# Patient Record
Sex: FEMALE | Race: WHITE | NOT HISPANIC OR LATINO | URBAN - METROPOLITAN AREA
[De-identification: names, ages, dates, MRNs, and addresses within clinical notes are randomized per-mention and may not be internally consistent; named-entity substitution may affect disease eponyms.]

---

## 2018-05-30 ENCOUNTER — EMERGENCY (EMERGENCY)
Facility: HOSPITAL | Age: 83
LOS: 1 days | Discharge: HOME | End: 2018-05-30
Attending: EMERGENCY MEDICINE
Payer: MEDICARE

## 2018-05-30 VITALS
SYSTOLIC BLOOD PRESSURE: 182 MMHG | DIASTOLIC BLOOD PRESSURE: 85 MMHG | TEMPERATURE: 98 F | OXYGEN SATURATION: 98 % | HEIGHT: 60 IN | RESPIRATION RATE: 16 BRPM | WEIGHT: 121.92 LBS | HEART RATE: 70 BPM

## 2018-05-30 VITALS — DIASTOLIC BLOOD PRESSURE: 75 MMHG | SYSTOLIC BLOOD PRESSURE: 169 MMHG | HEART RATE: 81 BPM

## 2018-05-30 DIAGNOSIS — I10 ESSENTIAL (PRIMARY) HYPERTENSION: ICD-10-CM

## 2018-05-30 DIAGNOSIS — K76.89 OTHER SPECIFIED DISEASES OF LIVER: Chronic | ICD-10-CM

## 2018-05-30 DIAGNOSIS — Z88.0 ALLERGY STATUS TO PENICILLIN: ICD-10-CM

## 2018-05-30 DIAGNOSIS — R29.810 FACIAL WEAKNESS: ICD-10-CM

## 2018-05-30 DIAGNOSIS — E78.5 HYPERLIPIDEMIA, UNSPECIFIED: ICD-10-CM

## 2018-05-30 LAB
ALBUMIN SERPL ELPH-MCNC: 3.9 G/DL — SIGNIFICANT CHANGE UP (ref 3.3–5)
ALP SERPL-CCNC: 70 U/L — SIGNIFICANT CHANGE UP (ref 40–120)
ALT FLD-CCNC: 23 U/L — SIGNIFICANT CHANGE UP (ref 12–78)
ANION GAP SERPL CALC-SCNC: 8 MMOL/L — SIGNIFICANT CHANGE UP (ref 5–17)
APTT BLD: 27.9 SEC — SIGNIFICANT CHANGE UP (ref 27.5–37.4)
AST SERPL-CCNC: 29 U/L — SIGNIFICANT CHANGE UP (ref 15–37)
BASOPHILS # BLD AUTO: 0.02 K/UL — SIGNIFICANT CHANGE UP (ref 0–0.2)
BASOPHILS NFR BLD AUTO: 0.3 % — SIGNIFICANT CHANGE UP (ref 0–2)
BILIRUB SERPL-MCNC: 0.4 MG/DL — SIGNIFICANT CHANGE UP (ref 0.2–1.2)
BUN SERPL-MCNC: 28 MG/DL — HIGH (ref 7–23)
CALCIUM SERPL-MCNC: 9 MG/DL — SIGNIFICANT CHANGE UP (ref 8.5–10.1)
CHLORIDE SERPL-SCNC: 102 MMOL/L — SIGNIFICANT CHANGE UP (ref 96–108)
CO2 SERPL-SCNC: 27 MMOL/L — SIGNIFICANT CHANGE UP (ref 22–31)
CREAT SERPL-MCNC: 1.2 MG/DL — SIGNIFICANT CHANGE UP (ref 0.5–1.3)
EOSINOPHIL # BLD AUTO: 0.05 K/UL — SIGNIFICANT CHANGE UP (ref 0–0.5)
EOSINOPHIL NFR BLD AUTO: 0.6 % — SIGNIFICANT CHANGE UP (ref 0–6)
GLUCOSE SERPL-MCNC: 112 MG/DL — HIGH (ref 70–99)
HCT VFR BLD CALC: 38.6 % — SIGNIFICANT CHANGE UP (ref 34.5–45)
HGB BLD-MCNC: 12.9 G/DL — SIGNIFICANT CHANGE UP (ref 11.5–15.5)
IMM GRANULOCYTES NFR BLD AUTO: 0.4 % — SIGNIFICANT CHANGE UP (ref 0–1.5)
INR BLD: 1.04 RATIO — SIGNIFICANT CHANGE UP (ref 0.88–1.16)
LYMPHOCYTES # BLD AUTO: 1.33 K/UL — SIGNIFICANT CHANGE UP (ref 1–3.3)
LYMPHOCYTES # BLD AUTO: 17.1 % — SIGNIFICANT CHANGE UP (ref 13–44)
MCHC RBC-ENTMCNC: 29.9 PG — SIGNIFICANT CHANGE UP (ref 27–34)
MCHC RBC-ENTMCNC: 33.4 GM/DL — SIGNIFICANT CHANGE UP (ref 32–36)
MCV RBC AUTO: 89.6 FL — SIGNIFICANT CHANGE UP (ref 80–100)
MONOCYTES # BLD AUTO: 1.11 K/UL — HIGH (ref 0–0.9)
MONOCYTES NFR BLD AUTO: 14.3 % — HIGH (ref 2–14)
NEUTROPHILS # BLD AUTO: 5.22 K/UL — SIGNIFICANT CHANGE UP (ref 1.8–7.4)
NEUTROPHILS NFR BLD AUTO: 67.3 % — SIGNIFICANT CHANGE UP (ref 43–77)
NRBC # BLD: 0 /100 WBCS — SIGNIFICANT CHANGE UP (ref 0–0)
PLATELET # BLD AUTO: 217 K/UL — SIGNIFICANT CHANGE UP (ref 150–400)
POTASSIUM SERPL-MCNC: 4.5 MMOL/L — SIGNIFICANT CHANGE UP (ref 3.5–5.3)
POTASSIUM SERPL-SCNC: 4.5 MMOL/L — SIGNIFICANT CHANGE UP (ref 3.5–5.3)
PROT SERPL-MCNC: 7.9 G/DL — SIGNIFICANT CHANGE UP (ref 6–8.3)
PROTHROM AB SERPL-ACNC: 11.3 SEC — SIGNIFICANT CHANGE UP (ref 9.8–12.7)
RBC # BLD: 4.31 M/UL — SIGNIFICANT CHANGE UP (ref 3.8–5.2)
RBC # FLD: 13.8 % — SIGNIFICANT CHANGE UP (ref 10.3–14.5)
SODIUM SERPL-SCNC: 137 MMOL/L — SIGNIFICANT CHANGE UP (ref 135–145)
WBC # BLD: 7.76 K/UL — SIGNIFICANT CHANGE UP (ref 3.8–10.5)
WBC # FLD AUTO: 7.76 K/UL — SIGNIFICANT CHANGE UP (ref 3.8–10.5)

## 2018-05-30 PROCEDURE — 70498 CT ANGIOGRAPHY NECK: CPT | Mod: 26

## 2018-05-30 PROCEDURE — 70450 CT HEAD/BRAIN W/O DYE: CPT

## 2018-05-30 PROCEDURE — 85610 PROTHROMBIN TIME: CPT

## 2018-05-30 PROCEDURE — 85027 COMPLETE CBC AUTOMATED: CPT

## 2018-05-30 PROCEDURE — 93010 ELECTROCARDIOGRAM REPORT: CPT

## 2018-05-30 PROCEDURE — 71045 X-RAY EXAM CHEST 1 VIEW: CPT | Mod: 26

## 2018-05-30 PROCEDURE — 99284 EMERGENCY DEPT VISIT MOD MDM: CPT | Mod: 25

## 2018-05-30 PROCEDURE — 70450 CT HEAD/BRAIN W/O DYE: CPT | Mod: 26,59

## 2018-05-30 PROCEDURE — 93005 ELECTROCARDIOGRAM TRACING: CPT

## 2018-05-30 PROCEDURE — 71045 X-RAY EXAM CHEST 1 VIEW: CPT

## 2018-05-30 PROCEDURE — 85730 THROMBOPLASTIN TIME PARTIAL: CPT

## 2018-05-30 PROCEDURE — 70496 CT ANGIOGRAPHY HEAD: CPT | Mod: 26

## 2018-05-30 PROCEDURE — 70498 CT ANGIOGRAPHY NECK: CPT

## 2018-05-30 PROCEDURE — 36415 COLL VENOUS BLD VENIPUNCTURE: CPT

## 2018-05-30 PROCEDURE — 96360 HYDRATION IV INFUSION INIT: CPT | Mod: XU

## 2018-05-30 PROCEDURE — 70496 CT ANGIOGRAPHY HEAD: CPT

## 2018-05-30 PROCEDURE — 99285 EMERGENCY DEPT VISIT HI MDM: CPT

## 2018-05-30 PROCEDURE — 80053 COMPREHEN METABOLIC PANEL: CPT

## 2018-05-30 RX ORDER — SODIUM CHLORIDE 9 MG/ML
3 INJECTION INTRAMUSCULAR; INTRAVENOUS; SUBCUTANEOUS ONCE
Qty: 0 | Refills: 0 | Status: COMPLETED | OUTPATIENT
Start: 2018-05-30 | End: 2018-05-30

## 2018-05-30 RX ORDER — ASPIRIN/CALCIUM CARB/MAGNESIUM 324 MG
325 TABLET ORAL ONCE
Qty: 0 | Refills: 0 | Status: COMPLETED | OUTPATIENT
Start: 2018-05-30 | End: 2018-05-30

## 2018-05-30 RX ORDER — SODIUM CHLORIDE 9 MG/ML
1000 INJECTION INTRAMUSCULAR; INTRAVENOUS; SUBCUTANEOUS ONCE
Qty: 0 | Refills: 0 | Status: COMPLETED | OUTPATIENT
Start: 2018-05-30 | End: 2018-05-30

## 2018-05-30 RX ADMIN — SODIUM CHLORIDE 1000 MILLILITER(S): 9 INJECTION INTRAMUSCULAR; INTRAVENOUS; SUBCUTANEOUS at 17:19

## 2018-05-30 RX ADMIN — Medication 325 MILLIGRAM(S): at 17:43

## 2018-05-30 RX ADMIN — SODIUM CHLORIDE 3 MILLILITER(S): 9 INJECTION INTRAMUSCULAR; INTRAVENOUS; SUBCUTANEOUS at 16:45

## 2018-05-30 NOTE — ED PROVIDER NOTE - OBJECTIVE STATEMENT
89 yo F p/w L sided facial droop, constant x past 2 days. No cp/sob/palp. no weak / dizzy / malaise. no change in speech. No numb/ting/focal weak. No neck pain / stiffness. no fever/chills/recent illness. no agg/allev factors. No other inj or co. no recent trauma.

## 2018-05-30 NOTE — ED PROVIDER NOTE - CHPI ED SYMPTOMS NEG
no confusion/no change in level of consciousness/no dizziness/no fever/no vomiting/no numbness/no loss of consciousness/no nausea/no weakness/no blurred vision

## 2018-05-30 NOTE — ED PROVIDER NOTE - PROGRESS NOTE DETAILS
Pt seen by Dr Ramires, check CTA head / neck, can dc home if no acute Pt seen by Dr Ramires, check CTA head / neck, can dc home if no acute. Upon further exam , no further droop seen. Tony Ramires re cta findings, ok to dc home, pt to fu with outtp neuro. Repeat CT in 2 3 - 6 months.  Reevaluated patient at bedside.  Patient feeling much improved.  Discussed the results of all diagnostic testing in ED and copies of all reports given.   An opportunity to ask questions was given.  Discussed the importance of prompt, close medical follow-up.  Patient will return with any changes, concerns or persistent / worsening symptoms.  Understanding of all instructions verbalized.   Dw joaquim re need for fu with Neuro, repeat cta in 3 - 6 mo and need for close, propmt fu

## 2018-05-30 NOTE — ED ADULT NURSE NOTE - OBJECTIVE STATEMENT
Received patient awake and alert x 4, presenting to the ED with C/O left sided facial droop that she first noticed on Monday 5/28/18. Patient reports no change in speech. Able to speak in full clear sentences, patient is otherwise neurologically intact, no extremity weakness, no dizziness, no blurry vision. Denies any SOB/CP. Breathing unlabored with no acute distress noted, caregiver at bedside. IV access maintained, safety and comfort measures maintained, will continue to monitor.

## 2018-05-30 NOTE — ED ADULT TRIAGE NOTE - CHIEF COMPLAINT QUOTE
Pt reports weakness/numbness to left face/mouth, slight facial droop, slight difficulty eating/drinking. Symptoms began on 5/28, slightly worse as per pt

## 2018-05-30 NOTE — ED PROVIDER NOTE - PHYSICAL EXAMINATION
NIHSS = 1, dysphagia pass  nl speech. Nl non-focal detailed neuro exam.  L lower facial droop, nl forehead. NIHSS = 1, dysphagia pass  nl speech. Nl non-focal detailed neuro exam.  ?? mild L lower facial droop, nl forehead.

## 2019-07-01 ENCOUNTER — EMERGENCY (EMERGENCY)
Facility: HOSPITAL | Age: 84
LOS: 1 days | Discharge: HOME | End: 2019-07-01
Attending: EMERGENCY MEDICINE | Admitting: EMERGENCY MEDICINE
Payer: MEDICARE

## 2019-07-01 VITALS
HEART RATE: 71 BPM | DIASTOLIC BLOOD PRESSURE: 75 MMHG | SYSTOLIC BLOOD PRESSURE: 174 MMHG | TEMPERATURE: 99 F | OXYGEN SATURATION: 98 % | WEIGHT: 125 LBS | RESPIRATION RATE: 16 BRPM

## 2019-07-01 VITALS
SYSTOLIC BLOOD PRESSURE: 156 MMHG | HEART RATE: 76 BPM | DIASTOLIC BLOOD PRESSURE: 73 MMHG | RESPIRATION RATE: 16 BRPM | TEMPERATURE: 99 F | OXYGEN SATURATION: 98 %

## 2019-07-01 DIAGNOSIS — K76.89 OTHER SPECIFIED DISEASES OF LIVER: Chronic | ICD-10-CM

## 2019-07-01 LAB
APPEARANCE UR: CLEAR — SIGNIFICANT CHANGE UP
BACTERIA # UR AUTO: ABNORMAL
BILIRUB UR-MCNC: NEGATIVE — SIGNIFICANT CHANGE UP
COLOR SPEC: YELLOW — SIGNIFICANT CHANGE UP
DIFF PNL FLD: ABNORMAL
EPI CELLS # UR: ABNORMAL
GLUCOSE UR QL: NEGATIVE — SIGNIFICANT CHANGE UP
KETONES UR-MCNC: NEGATIVE — SIGNIFICANT CHANGE UP
LEUKOCYTE ESTERASE UR-ACNC: ABNORMAL
NITRITE UR-MCNC: NEGATIVE — SIGNIFICANT CHANGE UP
PH UR: 5 — SIGNIFICANT CHANGE UP (ref 5–8)
PROT UR-MCNC: 25 MG/DL
RBC CASTS # UR COMP ASSIST: ABNORMAL /HPF (ref 0–4)
SP GR SPEC: 1.01 — SIGNIFICANT CHANGE UP (ref 1.01–1.02)
UROBILINOGEN FLD QL: NEGATIVE — SIGNIFICANT CHANGE UP
WBC UR QL: SIGNIFICANT CHANGE UP

## 2019-07-01 PROCEDURE — 81001 URINALYSIS AUTO W/SCOPE: CPT

## 2019-07-01 PROCEDURE — 99283 EMERGENCY DEPT VISIT LOW MDM: CPT

## 2019-07-01 PROCEDURE — 72110 X-RAY EXAM L-2 SPINE 4/>VWS: CPT

## 2019-07-01 PROCEDURE — 72110 X-RAY EXAM L-2 SPINE 4/>VWS: CPT | Mod: 26

## 2019-07-01 RX ORDER — TRAMADOL HYDROCHLORIDE 50 MG/1
1 TABLET ORAL
Qty: 30 | Refills: 0
Start: 2019-07-01

## 2019-07-01 RX ORDER — ACETAMINOPHEN 500 MG
650 TABLET ORAL ONCE
Refills: 0 | Status: COMPLETED | OUTPATIENT
Start: 2019-07-01 | End: 2019-07-01

## 2019-07-01 RX ORDER — TRAMADOL HYDROCHLORIDE 50 MG/1
25 TABLET ORAL ONCE
Refills: 0 | Status: DISCONTINUED | OUTPATIENT
Start: 2019-07-01 | End: 2019-07-01

## 2019-07-01 RX ORDER — OMEGA-3 ACID ETHYL ESTERS 1 G
0 CAPSULE ORAL
Qty: 0 | Refills: 0 | DISCHARGE

## 2019-07-01 RX ADMIN — TRAMADOL HYDROCHLORIDE 25 MILLIGRAM(S): 50 TABLET ORAL at 09:23

## 2019-07-01 RX ADMIN — Medication 650 MILLIGRAM(S): at 09:23

## 2019-07-01 RX ADMIN — TRAMADOL HYDROCHLORIDE 25 MILLIGRAM(S): 50 TABLET ORAL at 10:01

## 2019-07-01 RX ADMIN — Medication 650 MILLIGRAM(S): at 10:01

## 2019-07-01 NOTE — ED PROVIDER NOTE - NSFOLLOWUPINSTRUCTIONS_ED_ALL_ED_FT
Back Pain    Back pain is very common in adults. The cause of back pain is rarely dangerous and the pain often gets better over time. The cause of your back pain may not be known and may include strain of muscles or ligaments, degeneration of the spinal disks, or arthritis. Occasionally the pain may radiate down your leg(s). Over-the-counter medicines to reduce pain and inflammation are often the most helpful. Stretching and remaining active frequently helps the healing process.     SEEK IMMEDIATE MEDICAL CARE IF YOU HAVE ANY OF THE FOLLOWING SYMPTOMS: bowel or bladder control problems, unusual weakness or numbness in your arms or legs, nausea or vomiting, abdominal pain, fever, dizziness/lightheadedness.    for pain tylenol   for more severe pain use ultram with caution

## 2019-07-01 NOTE — ED PROVIDER NOTE - CARE PROVIDER_API CALL
[facility] [place])  Orthopaedic Surgery  [address] [place] [address]  Phone: [phone]  Fax: [phone]  Follow Up Time:

## 2019-07-01 NOTE — ED ADULT NURSE NOTE - OBJECTIVE STATEMENT
89 y/o female presents to the ed c/o lower back pain for the past few days. she has hx of severe spinal stenosis ad scoliosis. she states that she was doing leg exercises in bed and the pain became worse today. describes as a burning mid back pain with radiation to lower and left lower back. she denies nausea vomiting fever chills chest pain sob headache abdominal pain and urinary problems.

## 2019-07-01 NOTE — ED PROVIDER NOTE - CLINICAL SUMMARY MEDICAL DECISION MAKING FREE TEXT BOX
acute low back pain worse over past 4 days hx of low back pain and problems no follow up sx began after stretching in bed with mechanical cause

## 2019-07-01 NOTE — ED ADULT NURSE REASSESSMENT NOTE - NS ED NURSE REASSESS COMMENT FT1
pt ambulatory to the bathroom with walker which she uses at home, reports pain 5/10 after oral meds, ua sent at this time

## 2019-07-01 NOTE — ED ADULT NURSE REASSESSMENT NOTE - NS ED NURSE REASSESS COMMENT FT1
pt reports pain has imrpoved to 4/10, safe for discharge, dc instructions given, wheelchair out of er with transporter in wheelchair

## 2019-07-01 NOTE — ED PROVIDER NOTE - MUSCULOSKELETAL, MLM
Spine appears kyphotic and lumbar scoliosis there is arthritic hands. NO slr motor normal light touch intact normal rectal tone

## 2019-07-01 NOTE — ED ADULT NURSE NOTE - NSIMPLEMENTINTERV_GEN_ALL_ED
Implemented All Fall with Harm Risk Interventions:  [place] to call system. Call bell, personal items and telephone within reach. Instruct patient to call for assistance. Room bathroom lighting operational. Non-slip footwear when patient is off stretcher. Physically safe environment: no spills, clutter or unnecessary equipment. Stretcher in lowest position, wheels locked, appropriate side rails in place. Provide visual cue, wrist band, yellow gown, etc. Monitor gait and stability. Monitor for mental status changes and reorient to person, place, and time. Review medications for side effects contributing to fall risk. Reinforce activity limits and safety measures with patient and family. Provide visual clues: red socks.

## 2019-07-01 NOTE — ED PROVIDER NOTE - NORMAL, MLM
bernadette all pertinent systems normal Alert and oriented, no focal deficits, no motor or sensory deficits.

## 2019-07-01 NOTE — ED PROVIDER NOTE - CHPI ED SYMPTOMS NEG
no tingling/no anorexia/no constipation/no motor function loss/no bowel dysfunction/no bladder dysfunction/no fatigue/no neck tenderness/no numbness

## 2019-07-01 NOTE — ED PROVIDER NOTE - OBJECTIVE STATEMENT
pt is a 89 f who has hx of severe spinal stenosis, scoliosis, implanted spinal stimulator (old not maintained with dead battery), sciatica elev chol dm sp liver cyst surgery very remote smoker not a drinker no drugs. PMD dr Stokes at [facility], has no current spine surgeon or pain mgt specialist. usually walks with a walker at home, but for past 4 days after doing stretching exercises at home in bed, she has had pain in her mid low back that his burning and goes to left side of back . no radicular sx no changes in bowel or bladder habits no fever no chills no cp no other co. the pain is typical for exacerbation of her chronic pain   she takes no meds for the pain.  now she is so uncomfortable that according to daughter she is unable to ambulate far despite use of walker over past few days

## 2019-09-17 ENCOUNTER — EMERGENCY (EMERGENCY)
Facility: HOSPITAL | Age: 84
LOS: 1 days | Discharge: HOME | End: 2019-09-17
Attending: EMERGENCY MEDICINE | Admitting: EMERGENCY MEDICINE
Payer: MEDICARE

## 2019-09-17 VITALS
WEIGHT: 119.93 LBS | HEART RATE: 71 BPM | OXYGEN SATURATION: 98 % | RESPIRATION RATE: 18 BRPM | DIASTOLIC BLOOD PRESSURE: 80 MMHG | SYSTOLIC BLOOD PRESSURE: 178 MMHG | TEMPERATURE: 99 F

## 2019-09-17 VITALS
OXYGEN SATURATION: 98 % | DIASTOLIC BLOOD PRESSURE: 77 MMHG | HEART RATE: 62 BPM | SYSTOLIC BLOOD PRESSURE: 177 MMHG | RESPIRATION RATE: 16 BRPM

## 2019-09-17 DIAGNOSIS — K76.89 OTHER SPECIFIED DISEASES OF LIVER: Chronic | ICD-10-CM

## 2019-09-17 LAB
ALBUMIN SERPL ELPH-MCNC: 3.6 G/DL — SIGNIFICANT CHANGE UP (ref 3.3–5)
ALP SERPL-CCNC: 76 U/L — SIGNIFICANT CHANGE UP (ref 40–120)
ALT FLD-CCNC: 24 U/L — SIGNIFICANT CHANGE UP (ref 12–78)
ANION GAP SERPL CALC-SCNC: 9 MMOL/L — SIGNIFICANT CHANGE UP (ref 5–17)
APTT BLD: 27.9 SEC — LOW (ref 28.5–37)
AST SERPL-CCNC: 23 U/L — SIGNIFICANT CHANGE UP (ref 15–37)
BASOPHILS # BLD AUTO: 0.03 K/UL — SIGNIFICANT CHANGE UP (ref 0–0.2)
BASOPHILS NFR BLD AUTO: 0.4 % — SIGNIFICANT CHANGE UP (ref 0–2)
BILIRUB SERPL-MCNC: 0.4 MG/DL — SIGNIFICANT CHANGE UP (ref 0.2–1.2)
BUN SERPL-MCNC: 27 MG/DL — HIGH (ref 7–23)
CALCIUM SERPL-MCNC: 9 MG/DL — SIGNIFICANT CHANGE UP (ref 8.5–10.1)
CHLORIDE SERPL-SCNC: 105 MMOL/L — SIGNIFICANT CHANGE UP (ref 96–108)
CO2 SERPL-SCNC: 26 MMOL/L — SIGNIFICANT CHANGE UP (ref 22–31)
CREAT SERPL-MCNC: 1.1 MG/DL — SIGNIFICANT CHANGE UP (ref 0.5–1.3)
EOSINOPHIL # BLD AUTO: 0.09 K/UL — SIGNIFICANT CHANGE UP (ref 0–0.5)
EOSINOPHIL NFR BLD AUTO: 1.1 % — SIGNIFICANT CHANGE UP (ref 0–6)
GLUCOSE SERPL-MCNC: 114 MG/DL — HIGH (ref 70–99)
HCT VFR BLD CALC: 38.2 % — SIGNIFICANT CHANGE UP (ref 34.5–45)
HGB BLD-MCNC: 12.5 G/DL — SIGNIFICANT CHANGE UP (ref 11.5–15.5)
IMM GRANULOCYTES NFR BLD AUTO: 0.5 % — SIGNIFICANT CHANGE UP (ref 0–1.5)
INR BLD: 1.01 RATIO — SIGNIFICANT CHANGE UP (ref 0.88–1.16)
LYMPHOCYTES # BLD AUTO: 0.86 K/UL — LOW (ref 1–3.3)
LYMPHOCYTES # BLD AUTO: 10.1 % — LOW (ref 13–44)
MCHC RBC-ENTMCNC: 29.8 PG — SIGNIFICANT CHANGE UP (ref 27–34)
MCHC RBC-ENTMCNC: 32.7 GM/DL — SIGNIFICANT CHANGE UP (ref 32–36)
MCV RBC AUTO: 91.2 FL — SIGNIFICANT CHANGE UP (ref 80–100)
MONOCYTES # BLD AUTO: 1.13 K/UL — HIGH (ref 0–0.9)
MONOCYTES NFR BLD AUTO: 13.3 % — SIGNIFICANT CHANGE UP (ref 2–14)
NEUTROPHILS # BLD AUTO: 6.34 K/UL — SIGNIFICANT CHANGE UP (ref 1.8–7.4)
NEUTROPHILS NFR BLD AUTO: 74.6 % — SIGNIFICANT CHANGE UP (ref 43–77)
NRBC # BLD: 0 /100 WBCS — SIGNIFICANT CHANGE UP (ref 0–0)
PLATELET # BLD AUTO: 224 K/UL — SIGNIFICANT CHANGE UP (ref 150–400)
POTASSIUM SERPL-MCNC: 4.3 MMOL/L — SIGNIFICANT CHANGE UP (ref 3.5–5.3)
POTASSIUM SERPL-SCNC: 4.3 MMOL/L — SIGNIFICANT CHANGE UP (ref 3.5–5.3)
PROT SERPL-MCNC: 7.1 G/DL — SIGNIFICANT CHANGE UP (ref 6–8.3)
PROTHROM AB SERPL-ACNC: 11.4 SEC — SIGNIFICANT CHANGE UP (ref 10–12.9)
RBC # BLD: 4.19 M/UL — SIGNIFICANT CHANGE UP (ref 3.8–5.2)
RBC # FLD: 14.5 % — SIGNIFICANT CHANGE UP (ref 10.3–14.5)
SODIUM SERPL-SCNC: 140 MMOL/L — SIGNIFICANT CHANGE UP (ref 135–145)
WBC # BLD: 8.49 K/UL — SIGNIFICANT CHANGE UP (ref 3.8–10.5)
WBC # FLD AUTO: 8.49 K/UL — SIGNIFICANT CHANGE UP (ref 3.8–10.5)

## 2019-09-17 PROCEDURE — 96360 HYDRATION IV INFUSION INIT: CPT

## 2019-09-17 PROCEDURE — 99284 EMERGENCY DEPT VISIT MOD MDM: CPT | Mod: 25

## 2019-09-17 PROCEDURE — 36415 COLL VENOUS BLD VENIPUNCTURE: CPT

## 2019-09-17 PROCEDURE — 93005 ELECTROCARDIOGRAM TRACING: CPT

## 2019-09-17 PROCEDURE — 93010 ELECTROCARDIOGRAM REPORT: CPT

## 2019-09-17 PROCEDURE — 85610 PROTHROMBIN TIME: CPT

## 2019-09-17 PROCEDURE — 99284 EMERGENCY DEPT VISIT MOD MDM: CPT

## 2019-09-17 PROCEDURE — 85027 COMPLETE CBC AUTOMATED: CPT

## 2019-09-17 PROCEDURE — 85730 THROMBOPLASTIN TIME PARTIAL: CPT

## 2019-09-17 PROCEDURE — 70450 CT HEAD/BRAIN W/O DYE: CPT | Mod: 26

## 2019-09-17 PROCEDURE — 80053 COMPREHEN METABOLIC PANEL: CPT

## 2019-09-17 PROCEDURE — 70450 CT HEAD/BRAIN W/O DYE: CPT

## 2019-09-17 RX ORDER — DILTIAZEM HCL 120 MG
120 CAPSULE, EXT RELEASE 24 HR ORAL ONCE
Refills: 0 | Status: COMPLETED | OUTPATIENT
Start: 2019-09-17 | End: 2019-09-17

## 2019-09-17 RX ORDER — MECLIZINE HCL 12.5 MG
25 TABLET ORAL ONCE
Refills: 0 | Status: COMPLETED | OUTPATIENT
Start: 2019-09-17 | End: 2019-09-17

## 2019-09-17 RX ORDER — MECLIZINE HCL 12.5 MG
1 TABLET ORAL
Qty: 9 | Refills: 0
Start: 2019-09-17 | End: 2019-09-19

## 2019-09-17 RX ORDER — SODIUM CHLORIDE 9 MG/ML
1000 INJECTION INTRAMUSCULAR; INTRAVENOUS; SUBCUTANEOUS ONCE
Refills: 0 | Status: COMPLETED | OUTPATIENT
Start: 2019-09-17 | End: 2019-09-17

## 2019-09-17 RX ORDER — DILTIAZEM HCL 120 MG
120 CAPSULE, EXT RELEASE 24 HR ORAL ONCE
Refills: 0 | Status: DISCONTINUED | OUTPATIENT
Start: 2019-09-17 | End: 2019-09-17

## 2019-09-17 RX ADMIN — Medication 25 MILLIGRAM(S): at 20:21

## 2019-09-17 RX ADMIN — SODIUM CHLORIDE 1000 MILLILITER(S): 9 INJECTION INTRAMUSCULAR; INTRAVENOUS; SUBCUTANEOUS at 20:00

## 2019-09-17 RX ADMIN — SODIUM CHLORIDE 1000 MILLILITER(S): 9 INJECTION INTRAMUSCULAR; INTRAVENOUS; SUBCUTANEOUS at 21:00

## 2019-09-17 RX ADMIN — Medication 120 MILLIGRAM(S): at 22:24

## 2019-09-17 NOTE — ED ADULT NURSE REASSESSMENT NOTE - NS ED NURSE REASSESS COMMENT FT1
pt medicated for B/p as ordered- pt missed her dose - pt was d/kavin home denies dizziness at this time - pt verbalized understanding of d/c orders -assisted to vehicle in stable condition -

## 2019-09-17 NOTE — ED ADULT NURSE NOTE - CHPI ED NUR SYMPTOMS NEG
no numbness/no blurred vision/no weakness/no loss of consciousness/no vomiting/no fever/no nausea/no confusion/no change in level of consciousness

## 2019-09-17 NOTE — ED PROVIDER NOTE - OBJECTIVE STATEMENT
pt c/o < 30 min episode of room spinning dizziness at approx 1730. no fevers, chills, ha, cp, sob, cough, abd pain, weakness, numbness, speech or vision changes. symptoms now resolved  pmrosaura - sara

## 2019-09-17 NOTE — ED PROVIDER NOTE - PATIENT PORTAL LINK FT
You can access the FollowMyHealth Patient Portal offered by [facility] by registering at the following website: [email or web address]. By joining [employer]’s FollowMyHealth portal, you will also be able to view your health information using other applications (apps) compatible with our system.

## 2019-09-17 NOTE — ED PROVIDER NOTE - CHPI ED SYMPTOMS NEG
no numbness/no weakness/no change in level of consciousness/no fever/no loss of consciousness/no nausea/no vomiting/no confusion/no blurred vision

## 2019-09-17 NOTE — ED ADULT NURSE NOTE - OBJECTIVE STATEMENT
pt states she bent over earlier today and became dizzy which happens from time to time but this time the dizziness did not subside - states it has subsided somewhat at this time

## 2019-09-17 NOTE — ED PROVIDER NOTE - PROGRESS NOTE DETAILS
Reevaluated patient at bedside.  Patient feeling much improved, wants to be d/c home to f/u as outpt, not willing to stay in er for neuro eval in am.  Discussed the results of all diagnostic testing in ED and copies of all reports given.   An opportunity to ask questions was given.  Discussed the importance of prompt, close medical follow-up.  Patient will return with any changes, concerns or persistent / worsening symptoms.  Understanding of all instructions verbalized.

## 2019-09-18 ENCOUNTER — TRANSCRIPTION ENCOUNTER (OUTPATIENT)
Age: 84
End: 2019-09-18

## 2020-06-08 ENCOUNTER — INPATIENT (INPATIENT)
Facility: HOSPITAL | Age: 85
LOS: 0 days | Discharge: HOME | DRG: 310 | End: 2020-06-09
Attending: STUDENT IN AN ORGANIZED HEALTH CARE EDUCATION/TRAINING PROGRAM | Admitting: STUDENT IN AN ORGANIZED HEALTH CARE EDUCATION/TRAINING PROGRAM
Payer: MEDICARE

## 2020-06-08 VITALS
WEIGHT: 128.09 LBS | SYSTOLIC BLOOD PRESSURE: 175 MMHG | HEART RATE: 128 BPM | OXYGEN SATURATION: 98 % | RESPIRATION RATE: 16 BRPM | HEIGHT: 59 IN | TEMPERATURE: 99 F | DIASTOLIC BLOOD PRESSURE: 112 MMHG

## 2020-06-08 DIAGNOSIS — E78.00 PURE HYPERCHOLESTEROLEMIA, UNSPECIFIED: ICD-10-CM

## 2020-06-08 DIAGNOSIS — I47.1 SUPRAVENTRICULAR TACHYCARDIA: ICD-10-CM

## 2020-06-08 DIAGNOSIS — K76.89 OTHER SPECIFIED DISEASES OF LIVER: Chronic | ICD-10-CM

## 2020-06-08 DIAGNOSIS — I10 ESSENTIAL (PRIMARY) HYPERTENSION: ICD-10-CM

## 2020-06-08 DIAGNOSIS — Z29.9 ENCOUNTER FOR PROPHYLACTIC MEASURES, UNSPECIFIED: ICD-10-CM

## 2020-06-08 DIAGNOSIS — N18.9 CHRONIC KIDNEY DISEASE, UNSPECIFIED: ICD-10-CM

## 2020-06-08 LAB
ALBUMIN SERPL ELPH-MCNC: 3.8 G/DL — SIGNIFICANT CHANGE UP (ref 3.3–5)
ALP SERPL-CCNC: 85 U/L — SIGNIFICANT CHANGE UP (ref 40–120)
ALT FLD-CCNC: 22 U/L — SIGNIFICANT CHANGE UP (ref 12–78)
ANION GAP SERPL CALC-SCNC: 8 MMOL/L — SIGNIFICANT CHANGE UP (ref 5–17)
APTT BLD: 28.6 SEC — SIGNIFICANT CHANGE UP (ref 28.5–37)
AST SERPL-CCNC: 33 U/L — SIGNIFICANT CHANGE UP (ref 15–37)
BASOPHILS # BLD AUTO: 0.02 K/UL — SIGNIFICANT CHANGE UP (ref 0–0.2)
BASOPHILS NFR BLD AUTO: 0.2 % — SIGNIFICANT CHANGE UP (ref 0–2)
BILIRUB SERPL-MCNC: 0.6 MG/DL — SIGNIFICANT CHANGE UP (ref 0.2–1.2)
BUN SERPL-MCNC: 29 MG/DL — HIGH (ref 7–23)
CALCIUM SERPL-MCNC: 9.1 MG/DL — SIGNIFICANT CHANGE UP (ref 8.5–10.1)
CHLORIDE SERPL-SCNC: 103 MMOL/L — SIGNIFICANT CHANGE UP (ref 96–108)
CO2 SERPL-SCNC: 25 MMOL/L — SIGNIFICANT CHANGE UP (ref 22–31)
CREAT SERPL-MCNC: 1.3 MG/DL — SIGNIFICANT CHANGE UP (ref 0.5–1.3)
EOSINOPHIL # BLD AUTO: 0.06 K/UL — SIGNIFICANT CHANGE UP (ref 0–0.5)
EOSINOPHIL NFR BLD AUTO: 0.7 % — SIGNIFICANT CHANGE UP (ref 0–6)
GLUCOSE SERPL-MCNC: 101 MG/DL — HIGH (ref 70–99)
HCT VFR BLD CALC: 41.3 % — SIGNIFICANT CHANGE UP (ref 34.5–45)
HGB BLD-MCNC: 13.7 G/DL — SIGNIFICANT CHANGE UP (ref 11.5–15.5)
IMM GRANULOCYTES NFR BLD AUTO: 0.5 % — SIGNIFICANT CHANGE UP (ref 0–1.5)
INR BLD: 1.03 RATIO — SIGNIFICANT CHANGE UP (ref 0.88–1.16)
LYMPHOCYTES # BLD AUTO: 0.98 K/UL — LOW (ref 1–3.3)
LYMPHOCYTES # BLD AUTO: 11.8 % — LOW (ref 13–44)
MCHC RBC-ENTMCNC: 29.5 PG — SIGNIFICANT CHANGE UP (ref 27–34)
MCHC RBC-ENTMCNC: 33.2 GM/DL — SIGNIFICANT CHANGE UP (ref 32–36)
MCV RBC AUTO: 88.8 FL — SIGNIFICANT CHANGE UP (ref 80–100)
MONOCYTES # BLD AUTO: 1.05 K/UL — HIGH (ref 0–0.9)
MONOCYTES NFR BLD AUTO: 12.6 % — SIGNIFICANT CHANGE UP (ref 2–14)
NEUTROPHILS # BLD AUTO: 6.16 K/UL — SIGNIFICANT CHANGE UP (ref 1.8–7.4)
NEUTROPHILS NFR BLD AUTO: 74.2 % — SIGNIFICANT CHANGE UP (ref 43–77)
NRBC # BLD: 0 /100 WBCS — SIGNIFICANT CHANGE UP (ref 0–0)
PLATELET # BLD AUTO: 194 K/UL — SIGNIFICANT CHANGE UP (ref 150–400)
POTASSIUM SERPL-MCNC: 5.3 MMOL/L — SIGNIFICANT CHANGE UP (ref 3.5–5.3)
POTASSIUM SERPL-SCNC: 5.3 MMOL/L — SIGNIFICANT CHANGE UP (ref 3.5–5.3)
PROT SERPL-MCNC: 7.7 G/DL — SIGNIFICANT CHANGE UP (ref 6–8.3)
PROTHROM AB SERPL-ACNC: 11.5 SEC — SIGNIFICANT CHANGE UP (ref 10–12.9)
RBC # BLD: 4.65 M/UL — SIGNIFICANT CHANGE UP (ref 3.8–5.2)
RBC # FLD: 14.2 % — SIGNIFICANT CHANGE UP (ref 10.3–14.5)
SARS-COV-2 RNA SPEC QL NAA+PROBE: SIGNIFICANT CHANGE UP
SODIUM SERPL-SCNC: 136 MMOL/L — SIGNIFICANT CHANGE UP (ref 135–145)
TROPONIN I SERPL-MCNC: <.015 NG/ML — SIGNIFICANT CHANGE UP (ref 0.01–0.04)
TSH SERPL-MCNC: 1.83 UIU/ML — SIGNIFICANT CHANGE UP (ref 0.36–3.74)
WBC # BLD: 8.31 K/UL — SIGNIFICANT CHANGE UP (ref 3.8–10.5)
WBC # FLD AUTO: 8.31 K/UL — SIGNIFICANT CHANGE UP (ref 3.8–10.5)

## 2020-06-08 PROCEDURE — 71045 X-RAY EXAM CHEST 1 VIEW: CPT | Mod: 26

## 2020-06-08 PROCEDURE — 93010 ELECTROCARDIOGRAM REPORT: CPT

## 2020-06-08 PROCEDURE — 99285 EMERGENCY DEPT VISIT HI MDM: CPT

## 2020-06-08 PROCEDURE — 99223 1ST HOSP IP/OBS HIGH 75: CPT | Mod: GC,AI

## 2020-06-08 RX ORDER — ENOXAPARIN SODIUM 100 MG/ML
30 INJECTION SUBCUTANEOUS AT BEDTIME
Refills: 0 | Status: DISCONTINUED | OUTPATIENT
Start: 2020-06-08 | End: 2020-06-09

## 2020-06-08 RX ORDER — LISINOPRIL 2.5 MG/1
20 TABLET ORAL DAILY
Refills: 0 | Status: DISCONTINUED | OUTPATIENT
Start: 2020-06-08 | End: 2020-06-09

## 2020-06-08 RX ORDER — METOPROLOL TARTRATE 50 MG
50 TABLET ORAL ONCE
Refills: 0 | Status: COMPLETED | OUTPATIENT
Start: 2020-06-08 | End: 2020-06-08

## 2020-06-08 RX ORDER — ATENOLOL 25 MG/1
1 TABLET ORAL
Qty: 0 | Refills: 0 | DISCHARGE

## 2020-06-08 RX ORDER — AMIODARONE HYDROCHLORIDE 400 MG/1
400 TABLET ORAL
Refills: 0 | Status: DISCONTINUED | OUTPATIENT
Start: 2020-06-08 | End: 2020-06-09

## 2020-06-08 RX ORDER — METOPROLOL TARTRATE 50 MG
50 TABLET ORAL
Refills: 0 | Status: DISCONTINUED | OUTPATIENT
Start: 2020-06-08 | End: 2020-06-09

## 2020-06-08 RX ORDER — METOPROLOL TARTRATE 50 MG
5 TABLET ORAL ONCE
Refills: 0 | Status: COMPLETED | OUTPATIENT
Start: 2020-06-08 | End: 2020-06-08

## 2020-06-08 RX ORDER — DILTIAZEM HCL 120 MG
10 CAPSULE, EXT RELEASE 24 HR ORAL ONCE
Refills: 0 | Status: COMPLETED | OUTPATIENT
Start: 2020-06-08 | End: 2020-06-08

## 2020-06-08 RX ORDER — SIMVASTATIN 20 MG/1
10 TABLET, FILM COATED ORAL AT BEDTIME
Refills: 0 | Status: DISCONTINUED | OUTPATIENT
Start: 2020-06-08 | End: 2020-06-09

## 2020-06-08 RX ORDER — SODIUM CHLORIDE 9 MG/ML
1000 INJECTION INTRAMUSCULAR; INTRAVENOUS; SUBCUTANEOUS ONCE
Refills: 0 | Status: COMPLETED | OUTPATIENT
Start: 2020-06-08 | End: 2020-06-08

## 2020-06-08 RX ADMIN — ENOXAPARIN SODIUM 30 MILLIGRAM(S): 100 INJECTION SUBCUTANEOUS at 21:00

## 2020-06-08 RX ADMIN — SODIUM CHLORIDE 1000 MILLILITER(S): 9 INJECTION INTRAMUSCULAR; INTRAVENOUS; SUBCUTANEOUS at 19:04

## 2020-06-08 RX ADMIN — Medication 10 MILLIGRAM(S): at 17:45

## 2020-06-08 RX ADMIN — Medication 50 MILLIGRAM(S): at 20:59

## 2020-06-08 RX ADMIN — Medication 5 MILLIGRAM(S): at 19:03

## 2020-06-08 RX ADMIN — AMIODARONE HYDROCHLORIDE 400 MILLIGRAM(S): 400 TABLET ORAL at 20:58

## 2020-06-08 NOTE — H&P ADULT - NSHPSOCIALHISTORY_GEN_ALL_CORE
lives with daughter in law  walks with walker  denies smoking   denies etoh  denies drug use  received flu shot this year

## 2020-06-08 NOTE — CONSULT NOTE ADULT - SUBJECTIVE AND OBJECTIVE BOX
History of Present Illness: The patient is a 89 year old female with a history of OA, HTN, HL, atrial tachycardia who presents with palpitations. She has noted episodes of palpitations over the past year but they have increased in frequency recently. She states when she saw her PMD, her BP was noted to be elevated so her metoprolol succinate 100 mg daily was changed to metoprolol tartrate 50 mg bid. She went to see her cardiologist today and given that she was tachycardic in office, he sent her to ED. She received diltiazem 10 mg IV, metoprolol 5 mg IV, and metoprolol tartrate 50 mg PO. Her heart rates have been fluctuating from 50 bpm to 140 bpm.    Past Medical/Surgical History:  OA, HTN, HL, atrial tachycardia     Medications:  Metoprolol tartrate 50 mg bid  Fosinopril  Simvastatin    Family History: Non-contributory family history of premature cardiovascular atherosclerotic disease    Social History: No tobacco, alcohol or drug use    Review of Systems:  General: No fevers, chills, weight loss or gain  Skin: No rashes, color changes  Cardiovascular: No chest pain, orthopnea  Respiratory: No shortness of breath, cough  Gastrointestinal: No nausea, abdominal pain  Genitourinary: No incontinence, pain with urination  Musculoskeletal: No pain, swelling, decreased range of motion  Neurological: No headache, weakness  Psychiatric: No depression, anxiety  Endocrine: No weight loss or gain, increased thirst  All other systems are comprehensively negative.    Physical Exam:  Vitals:        Vital Signs Last 24 Hrs  T(C): 36.6 (08 Jun 2020 19:36), Max: 37 (08 Jun 2020 16:26)  T(F): 97.9 (08 Jun 2020 19:36), Max: 98.6 (08 Jun 2020 16:26)  HR: 70 (08 Jun 2020 19:36) (60 - 144)  BP: 174/76 (08 Jun 2020 19:36) (169/79 - 175/112)  BP(mean): --  RR: 20 (08 Jun 2020 19:36) (16 - 20)  SpO2: 100% (08 Jun 2020 19:36) (98% - 100%)  General: NAD  HEENT: MMM  Neck: No JVD, no carotid bruit  Lungs: CTAB  CV: RRR, nl S1/S2, no M/R/G  Abdomen: S/NT/ND, +BS  Extremities: No LE edema, no cyanosis  Neuro: AAOx3, non-focal  Skin: No rash    Labs:                        13.7   8.31  )-----------( 194      ( 08 Jun 2020 17:22 )             41.3     06-08    136  |  103  |  29<H>  ----------------------------<  101<H>  5.3   |  25  |  1.30    Ca    9.1      08 Jun 2020 17:22    TPro  7.7  /  Alb  3.8  /  TBili  0.6  /  DBili  x   /  AST  33  /  ALT  22  /  AlkPhos  85  06-08    CARDIAC MARKERS ( 08 Jun 2020 17:22 )  <.015 ng/mL / x     / x     / x     / x          PT/INR - ( 08 Jun 2020 17:22 )   PT: 11.5 sec;   INR: 1.03 ratio         PTT - ( 08 Jun 2020 17:22 )  PTT:28.6 sec    ECG: Atrial tachycardia, normal axis, no ST abnormality

## 2020-06-08 NOTE — CONSULT NOTE ADULT - ASSESSMENT
The patient is a 89 year old female with a history of OA, HTN, HL, atrial tachycardia who presents with palpitations in the setting of atrial tachycardia.    Plan:  - While in ED, patient has been fluctuating from sinus bradycardia in the 50s and atrial tachycardia in the 110-140s  - Given episodes of tachy and babar in ED, patient may benefit from a rhythm control strategy  - Start amiodarone 400 mg PO bid  - Continue metoprolol tartrate 50 mg bid  - If patient sustains in the 140s, can give intermittent diltiazem or metoprolol IVP  - Check TSH  - Watch on telemetry overnight  - Patient will likely have breakthrough episodes at times, but frequency and duration should be lessened with amiodarone  - Likely will benefit from EP eval as outpatient

## 2020-06-08 NOTE — H&P ADULT - NSHPPHYSICALEXAM_GEN_ALL_CORE
T(C): 36.6 (06-08-20 @ 19:36), Max: 37 (06-08-20 @ 16:26)  HR: 70 (06-08-20 @ 19:36) (60 - 144)  BP: 174/76 (06-08-20 @ 19:36) (169/79 - 175/112)  RR: 20 (06-08-20 @ 19:36) (16 - 20)  SpO2: 100% (06-08-20 @ 19:36) (98% - 100%)    GENERAL: patient appears well, no acute distress, appropriate, pleasant  EYES: sclera clear  ENMT: moist mucous membranes  NECK: supple  LUNGS: good air entry bilaterally, clear to auscultation, symmetric breath sounds, no wheezing or rhonchi appreciated  HEART: soft S1/S2, tachycardia, no murmurs noted, no lower extremity edema  GASTROINTESTINAL: abdomen is soft, nontender, nondistended, normoactive bowel sounds, no palpable masses  INTEGUMENT: warm, dry   MUSCULOSKELETAL: no clubbing or cyanosis, no obvious deformity  NEUROLOGIC: awake, alert, oriented x3, good muscle tone in 4 extremities, no obvious sensory deficits  PSYCHIATRIC: mood is good, affect is congruent, linear and logical thought process T(C): 36.6 (06-08-20 @ 19:36), Max: 37 (06-08-20 @ 16:26)  HR: 70 (06-08-20 @ 19:36) (60 - 144)  BP: 174/76 (06-08-20 @ 19:36) (169/79 - 175/112)  RR: 20 (06-08-20 @ 19:36) (16 - 20)  SpO2: 100% (06-08-20 @ 19:36) (98% - 100%)    GENERAL: patient appears well, no acute distress, appropriate, pleasant  EYES: sclera clear  ENMT: moist mucous membranes  NECK: supple  LUNGS: good air entry bilaterally, clear to auscultation, symmetric breath sounds, no wheezing or rhonchi appreciated  HEART: soft S1/S2, tachycardic, no murmurs noted, no lower extremity edema  GASTROINTESTINAL: abdomen is soft, nontender, nondistended, normoactive bowel sounds, no palpable masses  INTEGUMENT: warm, dry   MUSCULOSKELETAL: no clubbing or cyanosis, no obvious deformity  NEUROLOGIC: awake, alert, oriented x3, good muscle tone in 4 extremities, no obvious sensory deficits  PSYCHIATRIC: mood is good, affect is congruent, linear and logical thought process

## 2020-06-08 NOTE — ED PROVIDER NOTE - PHYSICAL EXAMINATION
Gen: Well appearing in NAD  Head: NC/AT  Neck: trachea midline  cv: tachycardic   Resp:  No distress  Ext: no deformities  Neuro:  A&O appears non focal  Skin:  Warm and dry as visualized  Psych:  Normal affect and mood

## 2020-06-08 NOTE — H&P ADULT - PROBLEM SELECTOR PLAN 1
admit to telemetry   HR fluctuating between 50-140s in ED   Started on amiodarone 400 mg PO bid by cardiology on admission   Continue metoprolol tartrate 50 mg bid  If patient sustains in the 140s, can give intermittent diltiazem or metoprolol IVP  Fu TSH in AM   Cardiology Dr. Haynes following, recs noted   monitor and replete electrolytes, keep Mg>2 and K>4 admit to telemetry   HR fluctuating between 50-140s in ED   Started on amiodarone 400 mg PO bid by cardiology on admission   Continue metoprolol tartrate 50 mg bid  If patient sustains in the 140s, can give intermittent diltiazem or metoprolol IVP  Fu TSH in AM - wnl in ED  Cardiology Dr. Mondragon consulted by ED, recs noted   monitor and replete electrolytes, keep Mg>2 and K>4  monitor on telemetry

## 2020-06-08 NOTE — H&P ADULT - PROBLEM SELECTOR PLAN 5
renally dosed lovenox 30mg   IMPROVE VTE Individual Risk Assessment          RISK                                                          Points    [  ] Previous VTE                                                3  [  ] Thrombophilia                                             2  [  ] Lower limb paralysis                                   2        (unable to hold up >15 seconds)    [  ] Current Cancer                                            2         (within 6 months)  [  ] Immobilization > 24 hrs                              1  [  ] ICU/CCU stay > 24 hours                            1  [ x ] Age > 60                                                    1    IMPROVE VTE Score ___1______

## 2020-06-08 NOTE — H&P ADULT - PROBLEM SELECTOR PLAN 2
appears to be stage 3b per chart review   creatinine baseline approximately 1.1-1.2.   Cr 1.3 on admission   avoid nephrotoxic meds  fu AM BMP appears to be stage 3b per chart review   creatinine baseline approximately 1.1-1.2.   Cr 1.3 on admission  avoid nephrotoxic meds  fu AM BMP

## 2020-06-08 NOTE — H&P ADULT - ASSESSMENT
89yo F with PMHx htn, hld, and atrial tachycardia presents with palpitations. Admitted with atrial tachycardia.

## 2020-06-08 NOTE — H&P ADULT - NSICDXFAMHXPERTINENTNEGATIVE_GEN_A_CORE_FT
sudden cardiac death - states mother  at 93, had "a transdermal patch" she assumed to be for her heart

## 2020-06-08 NOTE — H&P ADULT - PROBLEM SELECTOR PLAN 4
renally dosed lovenox 30mg   IMPROVE VTE Individual Risk Assessment          RISK                                                          Points    [  ] Previous VTE                                                3  [  ] Thrombophilia                                             2  [  ] Lower limb paralysis                                   2        (unable to hold up >15 seconds)    [  ] Current Cancer                                            2         (within 6 months)  [  ] Immobilization > 24 hrs                              1  [  ] ICU/CCU stay > 24 hours                            1  [ x ] Age > 60                                                    1    IMPROVE VTE Score ___1______ patient on fosinopril 20mg at home, will continue theraputic interchange of lisinopril 20mg while admitted  continue home dose metoprolol 50mg BID with hold parameters   dash/tlc diet patient on fosinopril 20mg at home, will continue therapeutic interchange of lisinopril 20mg while admitted  continue home dose metoprolol 50mg BID with hold parameters   dash/tlc diet

## 2020-06-08 NOTE — CONSULT NOTE ADULT - PROVIDER SPECIALTY LIST ADULT
[At Term] : at term [Normal Vaginal Route] : by normal vaginal route [de-identified] : mother smoked marijuana during pregnancy. No prenatal care [FreeTextEntry1] : 5-6 Cardiology

## 2020-06-08 NOTE — ED ADULT NURSE NOTE - OBJECTIVE STATEMENT
pt to er was sent from cardiologist for evaluation rapid heart rate upon arrival is alert oriented speech clear resp even unlabored skin intact pt placed on cardiac monitor ekg done iv started 20 angio right ac labs drawn

## 2020-06-08 NOTE — ED PROVIDER NOTE - OBJECTIVE STATEMENT
89yo F with htn, hld presents with palpitations. pt has beenhaving palpitations for about a year, has not been tolerating cardizem but takes lopressor, she has still been having symptoms so sent into ed today. no chest pain, no sob, no leg swelling   jacek- zana arreguin 89yo F with htn, hld presents with palpitations. pt has beenhaving palpitations for about a year, has not been tolerating cardizem but takes lopressor, she has still been having symptoms so sent into ed today. no chest pain, no sob, no leg swelling   cards- zana arreguin  per dr arreguin pt heart rate went up because she had her metoprolol dose lowered. just requesting that she be given IV cardizem and if her HR comes down she can be dcd with follow up and possible ablation in future

## 2020-06-08 NOTE — H&P ADULT - NSHPREVIEWOFSYSTEMS_GEN_ALL_CORE
CONSTITUTIONAL: denies fever, chills, fatigue, weakness  HEENT: denies blurred vision  CARDIOVASCULAR: denies chest pain, chest pressure, admits to palpitations  RESPIRATORY: denies shortness of breath  GASTROINTESTINAL: denies nausea, vomiting, diarrhea, abdominal pain  GENITOURINARY: denies dysuria  NEUROLOGICAL: denies numbness, headache, focal weakness  MUSCULOSKELETAL: denies new joint pain, muscle aches  HEMATOLOGIC: denies gross bleeding, bruising  LYMPHATICS: denies enlarged lymph nodes, extremity swelling

## 2020-06-08 NOTE — H&P ADULT - HISTORY OF PRESENT ILLNESS
89yo F with htn, hld, atrial tachycardia presents with palpitations. pt has beenhaving palpitations for about a year, has not been tolerating cardizem but takes lopressor, she has still been having symptoms so sent into ed today. no chest pain, no sob, no leg swelling   	cards- zana arreguin  per dr arreguin pt heart rate went up because she had her metoprolol dose lowered. just requesting that she be given IV cardizem and if her HR comes down she can be dcd with follow up and possible ablation in future 89yo F with htn, hld, atrial tachycardia presents with palpitations. Patient has been having palpitations for about a year, has not been tolerating Cardizem but takes lopressor. Per patient's cardiologist Dr. Purvis the patient's metoprolol dose was recently lowered and she believes this is what has caused her tachycardia.  she has still been having symptoms so sent into ed today. no chest pain, no sob, no leg swelling         In the ED  VS: T 98.6, , /112, RR 16, SpO2 98 on RA.   CXR: No infiltrates   EKG:  Given 1L NS bolus, cardizem 10mg IV x1, Lopressor 5mg IV x1, Toprol XL 50mg x1. Seen by cardiology Dr. Haynes in the ED 89yo F with htn, hld, atrial tachycardia presents with palpitations. Patient has been having palpitations for about a year, was previously on diltiazem and toprol xl however diltiazem was stopped as patient became dizzy after taking it. Her primary care physician changed her toprol xl to lopressor 50mg BID earlier this year after the patient was noted to have persistently high blood pressure. Per patient she has noted intermittent palpitations with no chest pain or shortness of breath. She went to her cardiologist today for a routine check up and was noted to be tachycardic and sent to the hospital  Per patient's cardiologist Dr. Church the patient's metoprolol dose was recently lowered and they believes this is what has caused her tachycardia.  she has still been having symptoms so sent into ed today. no chest pain, no sob, no leg swelling     In the ED  VS: T 98.6, , /112, RR 16, SpO2 98 on RA.   CXR: No infiltrates   EKG: sinus tachycardia, LAFB  Given 1L NS bolus, cardizem 10mg IV x1, Lopressor 5mg IV x1, Toprol XL 50mg x1. Seen by cardiology Dr. Haynes in the ED 89yo F with htn, hld, atrial tachycardia presents with palpitations. Patient has been having palpitations for about a year, was previously on diltiazem and toprol xl however diltiazem was stopped as patient became dizzy after taking it. Her primary care physician changed her toprol xl 100mg qd to lopressor 50mg BID earlier this year after the patient was noted to have persistently high blood pressure. Per patient she has noted intermittent palpitations with no chest pain or shortness of breath. She went to her cardiologist today for a routine check up and was noted to be tachycardic and sent to the hospital. Per patient's cardiologist, Dr. Church the patient's metoprolol dose was recently lowered and they believes this is what has caused her tachycardia. She has still been having symptoms so sent into ed today. She denies chest pain, shortness of breath or leg swelling (although admits to some ankle swelling which gets better in the morning.)     In the ED  VS: T 98.6, , /112, RR 16, SpO2 98 on RA.   CXR: No infiltrates   EKG: sinus tachycardia, LAFB  Given 1L NS bolus, cardizem 10mg IV x1, Lopressor 5mg IV x1, Toprol XL 50mg x1. Seen by cardiology Dr. Mondragon in the ED

## 2020-06-08 NOTE — ED PROVIDER NOTE - PROGRESS NOTE DETAILS
HR improved initially and then went back up HR improved initially and then went back up, unable to reach pt pmd or cardiologist, no answering service, will give IV metoprolol, reassess HR initially improved after metoprolol but then went back to 110s = spoke with cards Dr bernadette Mondragon , will see pt in ED seen by Dr Mondragon, recs for amio and admit

## 2020-06-09 ENCOUNTER — TRANSCRIPTION ENCOUNTER (OUTPATIENT)
Age: 85
End: 2020-06-09

## 2020-06-09 VITALS
OXYGEN SATURATION: 96 % | HEART RATE: 99 BPM | DIASTOLIC BLOOD PRESSURE: 87 MMHG | SYSTOLIC BLOOD PRESSURE: 146 MMHG | RESPIRATION RATE: 18 BRPM

## 2020-06-09 LAB
ALBUMIN SERPL ELPH-MCNC: 3.4 G/DL — SIGNIFICANT CHANGE UP (ref 3.3–5)
ALP SERPL-CCNC: 68 U/L — SIGNIFICANT CHANGE UP (ref 40–120)
ALT FLD-CCNC: 18 U/L — SIGNIFICANT CHANGE UP (ref 12–78)
ANION GAP SERPL CALC-SCNC: 7 MMOL/L — SIGNIFICANT CHANGE UP (ref 5–17)
AST SERPL-CCNC: 22 U/L — SIGNIFICANT CHANGE UP (ref 15–37)
BASOPHILS # BLD AUTO: 0.02 K/UL — SIGNIFICANT CHANGE UP (ref 0–0.2)
BASOPHILS NFR BLD AUTO: 0.3 % — SIGNIFICANT CHANGE UP (ref 0–2)
BILIRUB SERPL-MCNC: 0.7 MG/DL — SIGNIFICANT CHANGE UP (ref 0.2–1.2)
BUN SERPL-MCNC: 22 MG/DL — SIGNIFICANT CHANGE UP (ref 7–23)
CALCIUM SERPL-MCNC: 8.6 MG/DL — SIGNIFICANT CHANGE UP (ref 8.5–10.1)
CHLORIDE SERPL-SCNC: 108 MMOL/L — SIGNIFICANT CHANGE UP (ref 96–108)
CO2 SERPL-SCNC: 26 MMOL/L — SIGNIFICANT CHANGE UP (ref 22–31)
CREAT SERPL-MCNC: 0.92 MG/DL — SIGNIFICANT CHANGE UP (ref 0.5–1.3)
EOSINOPHIL # BLD AUTO: 0.1 K/UL — SIGNIFICANT CHANGE UP (ref 0–0.5)
EOSINOPHIL NFR BLD AUTO: 1.5 % — SIGNIFICANT CHANGE UP (ref 0–6)
GLUCOSE SERPL-MCNC: 89 MG/DL — SIGNIFICANT CHANGE UP (ref 70–99)
HCT VFR BLD CALC: 37 % — SIGNIFICANT CHANGE UP (ref 34.5–45)
HGB BLD-MCNC: 12.5 G/DL — SIGNIFICANT CHANGE UP (ref 11.5–15.5)
IMM GRANULOCYTES NFR BLD AUTO: 0.3 % — SIGNIFICANT CHANGE UP (ref 0–1.5)
LYMPHOCYTES # BLD AUTO: 0.9 K/UL — LOW (ref 1–3.3)
LYMPHOCYTES # BLD AUTO: 13.3 % — SIGNIFICANT CHANGE UP (ref 13–44)
MAGNESIUM SERPL-MCNC: 2.1 MG/DL — SIGNIFICANT CHANGE UP (ref 1.6–2.6)
MCHC RBC-ENTMCNC: 29.9 PG — SIGNIFICANT CHANGE UP (ref 27–34)
MCHC RBC-ENTMCNC: 33.8 GM/DL — SIGNIFICANT CHANGE UP (ref 32–36)
MCV RBC AUTO: 88.5 FL — SIGNIFICANT CHANGE UP (ref 80–100)
MONOCYTES # BLD AUTO: 1.04 K/UL — HIGH (ref 0–0.9)
MONOCYTES NFR BLD AUTO: 15.4 % — HIGH (ref 2–14)
NEUTROPHILS # BLD AUTO: 4.67 K/UL — SIGNIFICANT CHANGE UP (ref 1.8–7.4)
NEUTROPHILS NFR BLD AUTO: 69.2 % — SIGNIFICANT CHANGE UP (ref 43–77)
NRBC # BLD: 0 /100 WBCS — SIGNIFICANT CHANGE UP (ref 0–0)
PHOSPHATE SERPL-MCNC: 3.3 MG/DL — SIGNIFICANT CHANGE UP (ref 2.5–4.5)
PLATELET # BLD AUTO: 182 K/UL — SIGNIFICANT CHANGE UP (ref 150–400)
POTASSIUM SERPL-MCNC: 3.8 MMOL/L — SIGNIFICANT CHANGE UP (ref 3.5–5.3)
POTASSIUM SERPL-SCNC: 3.8 MMOL/L — SIGNIFICANT CHANGE UP (ref 3.5–5.3)
PROT SERPL-MCNC: 6.8 G/DL — SIGNIFICANT CHANGE UP (ref 6–8.3)
RBC # BLD: 4.18 M/UL — SIGNIFICANT CHANGE UP (ref 3.8–5.2)
RBC # FLD: 14.1 % — SIGNIFICANT CHANGE UP (ref 10.3–14.5)
SODIUM SERPL-SCNC: 141 MMOL/L — SIGNIFICANT CHANGE UP (ref 135–145)
TSH SERPL-MCNC: 2.14 UIU/ML — SIGNIFICANT CHANGE UP (ref 0.36–3.74)
WBC # BLD: 6.75 K/UL — SIGNIFICANT CHANGE UP (ref 3.8–10.5)
WBC # FLD AUTO: 6.75 K/UL — SIGNIFICANT CHANGE UP (ref 3.8–10.5)

## 2020-06-09 PROCEDURE — 99285 EMERGENCY DEPT VISIT HI MDM: CPT | Mod: 25

## 2020-06-09 PROCEDURE — 36415 COLL VENOUS BLD VENIPUNCTURE: CPT

## 2020-06-09 PROCEDURE — 83735 ASSAY OF MAGNESIUM: CPT

## 2020-06-09 PROCEDURE — 80053 COMPREHEN METABOLIC PANEL: CPT

## 2020-06-09 PROCEDURE — 71045 X-RAY EXAM CHEST 1 VIEW: CPT

## 2020-06-09 PROCEDURE — 87635 SARS-COV-2 COVID-19 AMP PRB: CPT

## 2020-06-09 PROCEDURE — 85610 PROTHROMBIN TIME: CPT

## 2020-06-09 PROCEDURE — 85027 COMPLETE CBC AUTOMATED: CPT

## 2020-06-09 PROCEDURE — 84100 ASSAY OF PHOSPHORUS: CPT

## 2020-06-09 PROCEDURE — 84484 ASSAY OF TROPONIN QUANT: CPT

## 2020-06-09 PROCEDURE — 99239 HOSP IP/OBS DSCHRG MGMT >30: CPT

## 2020-06-09 PROCEDURE — 85730 THROMBOPLASTIN TIME PARTIAL: CPT

## 2020-06-09 PROCEDURE — 93005 ELECTROCARDIOGRAM TRACING: CPT

## 2020-06-09 PROCEDURE — 96374 THER/PROPH/DIAG INJ IV PUSH: CPT

## 2020-06-09 PROCEDURE — 84443 ASSAY THYROID STIM HORMONE: CPT

## 2020-06-09 PROCEDURE — 96375 TX/PRO/DX INJ NEW DRUG ADDON: CPT

## 2020-06-09 RX ORDER — FOSINOPRIL SODIUM 10 MG/1
1 TABLET ORAL
Qty: 30 | Refills: 0
Start: 2020-06-09 | End: 2020-07-08

## 2020-06-09 RX ORDER — LISINOPRIL 2.5 MG/1
40 TABLET ORAL DAILY
Refills: 0 | Status: DISCONTINUED | OUTPATIENT
Start: 2020-06-10 | End: 2020-06-09

## 2020-06-09 RX ORDER — LISINOPRIL 2.5 MG/1
20 TABLET ORAL ONCE
Refills: 0 | Status: COMPLETED | OUTPATIENT
Start: 2020-06-09 | End: 2020-06-09

## 2020-06-09 RX ORDER — AMIODARONE HYDROCHLORIDE 400 MG/1
1 TABLET ORAL
Qty: 70 | Refills: 0
Start: 2020-06-09

## 2020-06-09 RX ORDER — FOSINOPRIL SODIUM 10 MG/1
1 TABLET ORAL
Qty: 0 | Refills: 0 | DISCHARGE

## 2020-06-09 RX ADMIN — Medication 50 MILLIGRAM(S): at 05:13

## 2020-06-09 RX ADMIN — LISINOPRIL 20 MILLIGRAM(S): 2.5 TABLET ORAL at 14:24

## 2020-06-09 RX ADMIN — LISINOPRIL 20 MILLIGRAM(S): 2.5 TABLET ORAL at 05:13

## 2020-06-09 RX ADMIN — AMIODARONE HYDROCHLORIDE 400 MILLIGRAM(S): 400 TABLET ORAL at 05:13

## 2020-06-09 NOTE — DISCHARGE NOTE NURSING/CASE MANAGEMENT/SOCIAL WORK - PATIENT PORTAL LINK FT
You can access the FollowMyHealth Patient Portal offered by [facility] by registering at the following website: [email or web address]. By joining [employer]’s FollowMyHealth portal, you will also be able to view your health information using other applications (apps) compatible with our system.

## 2020-06-09 NOTE — DISCHARGE NOTE PROVIDER - PROVIDER TOKENS
PROVIDER:[TOKEN:[[id number]],ESTABLISHEDPATIENT:[T]],PROVIDER:[TOKEN:[[id number]],ESTABLISHEDPATIENT:[T]]

## 2020-06-09 NOTE — PROGRESS NOTE ADULT - SUBJECTIVE AND OBJECTIVE BOX
Chief Complaint: Palpitations    Interval Events: No events overnight. Intermittently tachycardic. No further palpitations.    Review of Systems:  General: No fevers, chills, weight loss or gain  Skin: No rashes, color changes  Cardiovascular: No chest pain, orthopnea  Respiratory: No shortness of breath, cough  Gastrointestinal: No nausea, abdominal pain  Genitourinary: No incontinence, pain with urination  Musculoskeletal: No pain, swelling, decreased range of motion  Neurological: No headache, weakness  Psychiatric: No depression, anxiety  Endocrine: No weight loss or gain, increased thirst  All other systems are comprehensively negative.    Physical Exam:  Vitals:        Vital Signs Last 24 Hrs  T(C): 36.4 (09 Jun 2020 09:05), Max: 37 (08 Jun 2020 16:26)  T(F): 97.6 (09 Jun 2020 09:05), Max: 98.6 (08 Jun 2020 16:26)  HR: 67 (09 Jun 2020 09:05) (58 - 144)  BP: 180/84 (09 Jun 2020 09:05) (147/67 - 180/84)  BP(mean): --  RR: 20 (09 Jun 2020 09:05) (16 - 20)  SpO2: 95% (09 Jun 2020 09:05) (95% - 100%)  General: NAD  HEENT: MMM  Neck: No JVD, no carotid bruit  Lungs: CTAB  CV: RRR, nl S1/S2, no M/R/G  Abdomen: S/NT/ND, +BS  Extremities: No LE edema, no cyanosis  Neuro: AAOx3, non-focal  Skin: No rash    Labs:                        12.5   6.75  )-----------( 182      ( 09 Jun 2020 06:59 )             37.0     06-09    141  |  108  |  22  ----------------------------<  89  3.8   |  26  |  0.92    Ca    8.6      09 Jun 2020 06:59  Phos  3.3     06-09  Mg     2.1     06-09    TPro  6.8  /  Alb  3.4  /  TBili  0.7  /  DBili  x   /  AST  22  /  ALT  18  /  AlkPhos  68  06-09    CARDIAC MARKERS ( 08 Jun 2020 17:22 )  <.015 ng/mL / x     / x     / x     / x          PT/INR - ( 08 Jun 2020 17:22 )   PT: 11.5 sec;   INR: 1.03 ratio         PTT - ( 08 Jun 2020 17:22 )  PTT:28.6 sec    Telemetry: Sinus bradycardia, atrial tachycardia

## 2020-06-09 NOTE — DISCHARGE NOTE PROVIDER - HOSPITAL COURSE
FROM ADMISSION H+P:     HPI:    89yo F with htn, hld, atrial tachycardia presents with palpitations. Patient has been having palpitations for about a year, was previously on diltiazem and toprol xl however diltiazem was stopped as patient became dizzy after taking it. Her primary care physician changed her toprol xl 100mg qd to lopressor 50mg BID earlier this year after the patient was noted to have persistently high blood pressure. Per patient she has noted intermittent palpitations with no chest pain or shortness of breath. She went to her cardiologist today for a routine check up and was noted to be tachycardic and sent to the hospital. Per patient's cardiologist, Dr. Church the patient's metoprolol dose was recently lowered and they believes this is what has caused her tachycardia. She has still been having symptoms so sent into ed today. She denies chest pain, shortness of breath or leg swelling (although admits to some ankle swelling which gets better in the morning.)         In the ED    VS: T 98.6, , /112, RR 16, SpO2 98 on RA.     CXR: No infiltrates     EKG: sinus tachycardia, LAFB    Given 1L NS bolus, cardizem 10mg IV x1, Lopressor 5mg IV x1, Toprol XL 50mg x1. Seen by cardiology Dr. Mondragon in the ED (08 Jun 2020 20:24)            ---    HOSPITAL COURSE:    Pt who c/o palpitations and was admitted for Atrial Tachycardia. Received Diltiazem IVP 10mg x 1 and Amiodarone 400mg BID. Pt returned to sinus tachycardia the next day. Was seen by Cardio(Dr. Anthony Mondragon), who wanted to watch the patient for any further events of Atrial Tachycardia prior to discharge. Pt is medically stable for discharge to home.             ---    CONSULTANTS:     Cardio(Dr. Anthony Mondragon)        ---        FINAL DISCHARGE DIAGNOSIS LIST:    Please see last daily progress note for final discharge diagnoses        ---        Physical exam on day of discharge:        T(C): 36.4 (06-09-20 @ 09:05), Max: 37 (06-08-20 @ 16:26)    HR: 67 (06-09-20 @ 09:05) (58 - 144)    BP: 180/84 (06-09-20 @ 09:05) (147/67 - 180/84)    RR: 20 (06-09-20 @ 09:05) (16 - 20)    SpO2: 95% (06-09-20 @ 09:05) (95% - 100%)        Physical Exam:    General: Well developed, well nourished, no apparent distress    HEENT: Normocephalic, atraumatic, PERRLA, EOMI bilateral, moist mucous membranes     Neck: Supple, nontender, no mass    Neurology: Alert and oriented x3, nonfocal, sensation intact     Respiratory: Clear to auscultation bilateral, no wheezing, rales or ronchi     Cardio: S1,S2, no murmurs, rubs or gallops    Abdominal: Soft, non-tender, non-distended bowel sounds present x4, no palpable masses    Extremities: No clubbing, cyanosis or edema, peripheral pulses present    MSK: Normal range of motion, no joint erythema or warmth, no joint swelling     Heme: No obvious ecchymosis or petechiae     Skin: warm, dry, normal color FROM ADMISSION H+P:     HPI:    89yo F with htn, hld, atrial tachycardia presents with palpitations. Patient has been having palpitations for about a year, was previously on diltiazem and toprol xl however diltiazem was stopped as patient became dizzy after taking it. Her primary care physician changed her toprol xl 100mg qd to lopressor 50mg BID earlier this year after the patient was noted to have persistently high blood pressure. Per patient she has noted intermittent palpitations with no chest pain or shortness of breath. She went to her cardiologist today for a routine check up and was noted to be tachycardic and sent to the hospital. Per patient's cardiologist, Dr. Church the patient's metoprolol dose was recently lowered and they believes this is what has caused her tachycardia. She has still been having symptoms so sent into ed today. She denies chest pain, shortness of breath or leg swelling (although admits to some ankle swelling which gets better in the morning.)         In the ED    VS: T 98.6, , /112, RR 16, SpO2 98 on RA.     CXR: No infiltrates     EKG: sinus tachycardia, LAFB    Given 1L NS bolus, cardizem 10mg IV x1, Lopressor 5mg IV x1, Toprol XL 50mg x1. Seen by cardiology Dr. Mondragon in the ED (2020 20:24)        You had an elevated heart rate , secondary to sinus rhtym and atrial tachycardia, will start on amio     - Please take Amiodarone 200m tablets twice a day for 7 days, then take 1 tablet twice a day for 14 days, then decrease your dose to 1 tablet daily    - Continue your home Metoprolol Tartrate 50mg twice a day    - You should follow up with your primary care doctor Dr. Stokes and your cardiologist Dr. Purvis within 1-2 weeks of discharge.                ---    CONSULTANTS:     Cardio(Dr. Anthony Mondragon)        ---        FINAL DISCHARGE DIAGNOSIS LIST:    Please see last daily progress note for final discharge diagnoses        ---        Physical exam on day of discharge:        T(C): 36.4 (20 @ 09:05), Max: 37 (20 @ 16:26)    HR: 67 (20 @ 09:05) (58 - 144)    BP: 180/84 (20 @ 09:05) (147/67 - 180/84)    RR: 20 (20 @ 09:05) (16 - 20)    SpO2: 95% (20 @ 09:05) (95% - 100%)        Physical Exam:    General: Well developed, well nourished, no apparent distress    HEENT: Normocephalic, atraumatic, PERRLA, EOMI bilateral, moist mucous membranes     Neck: Supple, nontender, no mass    Neurology: Alert and oriented x3, nonfocal, sensation intact     Respiratory: Clear to auscultation bilateral, no wheezing, rales or ronchi     Cardio: S1,S2, no murmurs, rubs or gallops    Abdominal: Soft, non-tender, non-distended bowel sounds present x4, no palpable masses    Extremities: No clubbing, cyanosis or edema, peripheral pulses present    MSK: Normal range of motion, no joint erythema or warmth, no joint swelling     Heme: No obvious ecchymosis or petechiae     Skin: warm, dry, normal color

## 2020-06-09 NOTE — PROGRESS NOTE ADULT - ASSESSMENT
The patient is a 89 year old female with a history of OA, HTN, HL, atrial tachycardia who presents with palpitations in the setting of atrial tachycardia.    Plan:  - Rhythm continues to fluctuate between sinus rhythm and atrial tachycardia, although currently asymptomatic with heart rates predominantly in the 90s  - At times, patient is sinus bradycardic in the 50s and atrial tachycardic in the 110-120s  - Continue amiodarone 400 mg PO bid for 1 week, then 200 mg PO bid for 2 weeks, then 200 mg PO daily  - Continue metoprolol tartrate 50 mg bid  - Increase lisinopril to 40 mg daily  - TSH normal  - Evaluate heart rates and symptoms with ambulation  - If heart rates remain less than 120 with ambulation, can be discharged later today  - Likely will benefit from EP eval as outpatient

## 2020-06-09 NOTE — DISCHARGE NOTE PROVIDER - CARE PROVIDER_API CALL
Rebecca Stokes  INTERNAL MEDICINE  [address] [place] [address]  Phone: [phone]  Fax: [phone]  Established Patient  Follow Up Time:     Ranjit Purvis  CARDIOVASCULAR DISEASE  [address] [place] [address]  Phone: [phone]  Fax: [phone]  Established Patient  Follow Up Time:

## 2020-06-09 NOTE — DISCHARGE NOTE PROVIDER - NSDCFUADDINST_GEN_ALL_CORE_FT
You should follow up with your primary care doctor Dr. Stokes and your cardiologist Dr. Purvis within 1-2 weeks of discharge.

## 2020-06-09 NOTE — ED ADULT NURSE REASSESSMENT NOTE - NS ED NURSE REASSESS COMMENT FT1
Pt HR dropped to 56 while resting in bed. Pt denies any distress at this time.
report to next shift RN
Pt is AOx4 denies feeling palpitations, chest pain, SOB, or any other discomforts at this time. Spoke with daughter Delphine and updated her and the patient on plan of care. will reassess.

## 2020-06-09 NOTE — DISCHARGE NOTE PROVIDER - NSDCCPCAREPLAN_GEN_ALL_CORE_FT
PRINCIPAL DISCHARGE DIAGNOSIS  Diagnosis: Atrial tachycardia  Assessment and Plan of Treatment: You had an elevated heart rate. You were given medications to help reduce your heart rate back to appropriate levels and had cardiologist Dr. Mondragon evlauate you.  - Please take Amiodarone 200m tablets twice a day for 7 days, then take 1 tablet twice a day for 14 days, then decrease your dose to 1 tablet daily  - Continue your home Metoprolol Tartrate 50mg twice a day  - You should follow up with your primary care doctor Dr. Stokes and your cardiologist Dr. Purvis within 1-2 weeks of discharge.      SECONDARY DISCHARGE DIAGNOSES  Diagnosis: Hypercholesteremia  Assessment and Plan of Treatment: Continue your home Simvastatin 10mg at bedtime    Diagnosis: HTN (hypertension)  Assessment and Plan of Treatment: Your blood pressure was elevated in the hosptial and we increased your medication dose.  - You should now take the increased dose of Fosinopril 40mg daily  - You should follow up with your primary care doctor Dr. Stokes and your cardiologist Dr. Purvis within 1-2 weeks of discharge.

## 2020-06-09 NOTE — DISCHARGE NOTE PROVIDER - NSDCMRMEDTOKEN_GEN_ALL_CORE_FT
amiodarone 200 mg oral tablet: Take 2 tabs twice a day for 7 days, then take 1 tablet twice a day for 14 days, then decrease your dose to 1 tablet daily  Caltrate 600 + D oral tablet: 1 tab(s) orally once a day  Fish Oil oral capsule:   fosinopril 40 mg oral tablet: 1 tab(s) orally once a day   magnesium:   Metoprolol Tartrate 50 mg oral tablet: 1 tab(s) orally 2 times a day  PreserVision oral tablet: 1 tab(s) orally once a day  simvastatin 10 mg oral tablet: 1 tab(s) orally once a day (at bedtime)

## 2020-12-06 NOTE — ED ADULT TRIAGE NOTE - MODE OF ARRIVAL
[General Appearance - Alert] : alert [General Appearance - In No Acute Distress] : in no acute distress [Sclera] : the sclera and conjunctiva were normal [PERRL With Normal Accommodation] : pupils were equal in size, round, reactive to light [Extraocular Movements] : extraocular movements were intact Walk in [Outer Ear] : the ears and nose were normal in appearance [Oropharynx] : the oropharynx was normal with no thrush [Neck Appearance] : the appearance of the neck was normal [Neck Cervical Mass (___cm)] : no neck mass was observed [Jugular Venous Distention Increased] : there was no jugular-venous distention [Thyroid Diffuse Enlargement] : the thyroid was not enlarged [Auscultation Breath Sounds / Voice Sounds] : lungs were clear to auscultation bilaterally [Heart Rate And Rhythm] : heart rate was normal and rhythm regular [Heart Sounds] : normal S1 and S2 [Heart Sounds Gallop] : no gallops [Murmurs] : no murmurs [Heart Sounds Pericardial Friction Rub] : no pericardial rub [Full Pulse] : the pedal pulses are present [Edema] : there was no peripheral edema [Bowel Sounds] : normal bowel sounds [Abdomen Soft] : soft [Abdomen Tenderness] : non-tender [Abdomen Mass (___ Cm)] : no abdominal mass palpated [Costovertebral Angle Tenderness] : no CVA tenderness [No Palpable Adenopathy] : no palpable adenopathy [employer] [Musculoskeletal - Swelling] : no joint swelling [Nail Clubbing] : no clubbing  or cyanosis of the fingernails [Motor Tone] : muscle strength and tone were normal [Skin Color & Pigmentation] : normal skin color and pigmentation [] : no rash [Deep Tendon Reflexes (DTR)] : deep tendon reflexes were 2+ and symmetric [Sensation] : the sensory exam was normal to light touch and pinprick [No Focal Deficits] : no focal deficits [Oriented To Time, Place, And Person] : oriented to person, place, and time [Affect] : the affect was normal

## 2021-08-19 NOTE — ED ADULT TRIAGE NOTE - BMI (KG/M2)
Quality 226: Preventive Care And Screening: Tobacco Use: Screening And Cessation Intervention: Patient screened for tobacco use and is an ex/non-smoker Detail Level: Detailed 25.9

## 2022-02-09 NOTE — ED PROVIDER NOTE - NS_EDPROVIDERDISPOUSERTYPE_ED_A_ED
----- Message from Leon Gonzalez, DO sent at 2/9/2022  2:02 PM EST -----  I think this patient had a switch in coverage and repatha is not being covered. I wrote for P[employer] but can you check and make sure that we dont need to do prior auth??  Thanls    
I submitted a PA on Cover my Meds- will wait for a decision.  
Attending Attestation (For Attendings USE Only)...

## 2023-01-29 NOTE — ED PROVIDER NOTE - DISCHARGE DATE
November 4, 2020      Charly Gotti  [address] [place] [phone]        Dear ,    We are writing to inform you of your test results.    You have 1 liver test that is slightly abnormal.  It looks to me like you need treatment for your cholesterol.  If you would like to start with a low-cholesterol low-fat diet and repeat the tests again in 2 months that would be okay.  Let me know how you would like to proceed.    Resulted Orders   UA with Microscopic   Result Value Ref Range    Color Urine Yellow     Appearance Urine Clear     Glucose Urine Negative NEG^Negative mg/dL    Bilirubin Urine Negative NEG^Negative    Ketones Urine Trace (A) NEG^Negative mg/dL    Specific Gravity Urine 1.025 1.003 - 1.035    pH Urine 6.0 5.0 - 7.0 pH    Protein Albumin Urine Negative NEG^Negative mg/dL    Urobilinogen Urine 1.0 0.2 - 1.0 EU/dL    Nitrite Urine Negative NEG^Negative    Blood Urine Negative NEG^Negative    Leukocyte Esterase Urine Negative NEG^Negative    Source Midstream Urine     WBC Urine 0 - 5 OTO5^0 - 5 /HPF    RBC Urine O - 2 OTO2^O - 2 /HPF   CBC with platelets differential   Result Value Ref Range    WBC 8.0 4.0 - 11.0 10e9/L    RBC Count 4.74 4.4 - 5.9 10e12/L    Hemoglobin 13.7 13.3 - 17.7 g/dL    Hematocrit 42.0 40.0 - 53.0 %    MCV 89 78 - 100 fl    MCH 28.9 26.5 - 33.0 pg    MCHC 32.6 31.5 - 36.5 g/dL    RDW 12.8 10.0 - 15.0 %    Platelet Count 283 150 - 450 10e9/L    Diff Method Automated Method     % Neutrophils 42.8 %    % Lymphocytes 42.8 %    % Monocytes 11.1 %    % Eosinophils 3.0 %    % Basophils 0.3 %    Absolute Neutrophil 3.4 1.6 - 8.3 10e9/L    Absolute Lymphocytes 3.4 0.8 - 5.3 10e9/L    Absolute Monocytes 0.9 0.0 - 1.3 10e9/L    Absolute Eosinophils 0.2 0.0 - 0.7 10e9/L    Absolute Basophils 0.0 0.0 - 0.2 10e9/L   Comprehensive metabolic panel   Result Value Ref Range    Sodium 139 133 - 144 mmol/L    Potassium 4.3 3.4 - 5.3 mmol/L    Chloride 108 94 - 109 mmol/L     Carbon Dioxide 27 20 - 32 mmol/L    Anion Gap 4 3 - 14 mmol/L    Glucose 80 70 - 99 mg/dL    Urea Nitrogen 10 7 - 30 mg/dL    Creatinine 0.80 0.66 - 1.25 mg/dL    GFR Estimate >90 >60 mL/min/[1.73_m2]      Comment:      Non  GFR Calc  Starting 12/18/2018, serum creatinine based estimated GFR (eGFR) will be   calculated using the Chronic Kidney Disease Epidemiology Collaboration   (CKD-EPI) equation.      GFR Estimate If Black >90 >60 mL/min/[1.73_m2]      Comment:       GFR Calc  Starting 12/18/2018, serum creatinine based estimated GFR (eGFR) will be   calculated using the Chronic Kidney Disease Epidemiology Collaboration   (CKD-EPI) equation.      Calcium 9.2 8.5 - 10.1 mg/dL    Bilirubin Total 0.3 0.2 - 1.3 mg/dL    Albumin 3.8 3.4 - 5.0 g/dL    Protein Total 7.6 6.8 - 8.8 g/dL    Alkaline Phosphatase 85 40 - 150 U/L    ALT 96 (H) 0 - 70 U/L    AST 35 0 - 45 U/L   Lipid Profile   Result Value Ref Range    Cholesterol 249 (H) <200 mg/dL      Comment:      Desirable:       <200 mg/dl    Triglycerides 239 (H) <150 mg/dL      Comment:      Borderline high:  150-199 mg/dl  High:             200-499 mg/dl  Very high:       >499 mg/dl      HDL Cholesterol 35 (L) >39 mg/dL    LDL Cholesterol Calculated 166 (H) <100 mg/dL      Comment:      Above desirable:  100-129 mg/dl  Borderline High:  130-159 mg/dL  High:             160-189 mg/dL  Very high:       >189 mg/dl      Non HDL Cholesterol 214 (H) <130 mg/dL      Comment:      Above Desirable:  130-159 mg/dl  Borderline high:  160-189 mg/dl  High:             190-219 mg/dl  Very high:       >219 mg/dl         If you have any questions or concerns, please call the clinic at the number listed above.       Sincerely,        Mikey Epstein MD                 Patent 01-Jul-2019

## 2023-09-03 ENCOUNTER — EMERGENCY (EMERGENCY)
Facility: HOSPITAL | Age: 88
LOS: 1 days | Discharge: HOME | End: 2023-09-03
Attending: STUDENT IN AN ORGANIZED HEALTH CARE EDUCATION/TRAINING PROGRAM | Admitting: STUDENT IN AN ORGANIZED HEALTH CARE EDUCATION/TRAINING PROGRAM
Payer: MEDICARE

## 2023-09-03 VITALS
WEIGHT: 111.99 LBS | RESPIRATION RATE: 18 BRPM | OXYGEN SATURATION: 99 % | TEMPERATURE: 98 F | HEIGHT: 59 IN | HEART RATE: 81 BPM | SYSTOLIC BLOOD PRESSURE: 181 MMHG | DIASTOLIC BLOOD PRESSURE: 76 MMHG

## 2023-09-03 VITALS
TEMPERATURE: 98 F | SYSTOLIC BLOOD PRESSURE: 168 MMHG | HEART RATE: 80 BPM | RESPIRATION RATE: 18 BRPM | OXYGEN SATURATION: 98 % | DIASTOLIC BLOOD PRESSURE: 75 MMHG

## 2023-09-03 DIAGNOSIS — K76.89 OTHER SPECIFIED DISEASES OF LIVER: Chronic | ICD-10-CM

## 2023-09-03 PROBLEM — I47.1 SUPRAVENTRICULAR TACHYCARDIA: Chronic | Status: ACTIVE | Noted: 2020-06-08

## 2023-09-03 LAB
ALBUMIN SERPL ELPH-MCNC: 3.7 G/DL — SIGNIFICANT CHANGE UP (ref 3.3–5)
ALP SERPL-CCNC: 71 U/L — SIGNIFICANT CHANGE UP (ref 40–120)
ALT FLD-CCNC: 17 U/L — SIGNIFICANT CHANGE UP (ref 12–78)
ANION GAP SERPL CALC-SCNC: 6 MMOL/L — SIGNIFICANT CHANGE UP (ref 5–17)
APPEARANCE UR: CLEAR — SIGNIFICANT CHANGE UP
AST SERPL-CCNC: 19 U/L — SIGNIFICANT CHANGE UP (ref 15–37)
BASOPHILS # BLD AUTO: 0.02 K/UL — SIGNIFICANT CHANGE UP (ref 0–0.2)
BASOPHILS NFR BLD AUTO: 0.2 % — SIGNIFICANT CHANGE UP (ref 0–2)
BILIRUB SERPL-MCNC: 0.6 MG/DL — SIGNIFICANT CHANGE UP (ref 0.2–1.2)
BILIRUB UR-MCNC: NEGATIVE — SIGNIFICANT CHANGE UP
BUN SERPL-MCNC: 21 MG/DL — SIGNIFICANT CHANGE UP (ref 7–23)
CALCIUM SERPL-MCNC: 9.1 MG/DL — SIGNIFICANT CHANGE UP (ref 8.5–10.1)
CHLORIDE SERPL-SCNC: 98 MMOL/L — SIGNIFICANT CHANGE UP (ref 96–108)
CO2 SERPL-SCNC: 26 MMOL/L — SIGNIFICANT CHANGE UP (ref 22–31)
COLOR SPEC: YELLOW — SIGNIFICANT CHANGE UP
CREAT SERPL-MCNC: 0.98 MG/DL — SIGNIFICANT CHANGE UP (ref 0.5–1.3)
DIFF PNL FLD: NEGATIVE — SIGNIFICANT CHANGE UP
EGFR: 54 ML/MIN/1.73M2 — LOW
EOSINOPHIL # BLD AUTO: 0 K/UL — SIGNIFICANT CHANGE UP (ref 0–0.5)
EOSINOPHIL NFR BLD AUTO: 0 % — SIGNIFICANT CHANGE UP (ref 0–6)
FLUAV AG NPH QL: SIGNIFICANT CHANGE UP
FLUBV AG NPH QL: SIGNIFICANT CHANGE UP
GLUCOSE SERPL-MCNC: 115 MG/DL — HIGH (ref 70–99)
GLUCOSE UR QL: NEGATIVE MG/DL — SIGNIFICANT CHANGE UP
HCT VFR BLD CALC: 39.2 % — SIGNIFICANT CHANGE UP (ref 34.5–45)
HGB BLD-MCNC: 12.8 G/DL — SIGNIFICANT CHANGE UP (ref 11.5–15.5)
IMM GRANULOCYTES NFR BLD AUTO: 0.3 % — SIGNIFICANT CHANGE UP (ref 0–0.9)
KETONES UR-MCNC: ABNORMAL MG/DL
LEUKOCYTE ESTERASE UR-ACNC: NEGATIVE — SIGNIFICANT CHANGE UP
LYMPHOCYTES # BLD AUTO: 0.52 K/UL — LOW (ref 1–3.3)
LYMPHOCYTES # BLD AUTO: 5.9 % — LOW (ref 13–44)
MAGNESIUM SERPL-MCNC: 2.1 MG/DL — SIGNIFICANT CHANGE UP (ref 1.6–2.6)
MCHC RBC-ENTMCNC: 29.6 PG — SIGNIFICANT CHANGE UP (ref 27–34)
MCHC RBC-ENTMCNC: 32.7 GM/DL — SIGNIFICANT CHANGE UP (ref 32–36)
MCV RBC AUTO: 90.7 FL — SIGNIFICANT CHANGE UP (ref 80–100)
MONOCYTES # BLD AUTO: 0.66 K/UL — SIGNIFICANT CHANGE UP (ref 0–0.9)
MONOCYTES NFR BLD AUTO: 7.5 % — SIGNIFICANT CHANGE UP (ref 2–14)
NEUTROPHILS # BLD AUTO: 7.58 K/UL — HIGH (ref 1.8–7.4)
NEUTROPHILS NFR BLD AUTO: 86.1 % — HIGH (ref 43–77)
NITRITE UR-MCNC: NEGATIVE — SIGNIFICANT CHANGE UP
NRBC # BLD: 0 /100 WBCS — SIGNIFICANT CHANGE UP (ref 0–0)
PH UR: 6.5 — SIGNIFICANT CHANGE UP (ref 5–8)
PLATELET # BLD AUTO: 225 K/UL — SIGNIFICANT CHANGE UP (ref 150–400)
POTASSIUM SERPL-MCNC: 4 MMOL/L — SIGNIFICANT CHANGE UP (ref 3.5–5.3)
POTASSIUM SERPL-SCNC: 4 MMOL/L — SIGNIFICANT CHANGE UP (ref 3.5–5.3)
PROT SERPL-MCNC: 7.6 G/DL — SIGNIFICANT CHANGE UP (ref 6–8.3)
PROT UR-MCNC: SIGNIFICANT CHANGE UP MG/DL
RBC # BLD: 4.32 M/UL — SIGNIFICANT CHANGE UP (ref 3.8–5.2)
RBC # FLD: 13.9 % — SIGNIFICANT CHANGE UP (ref 10.3–14.5)
RSV RNA NPH QL NAA+NON-PROBE: SIGNIFICANT CHANGE UP
SARS-COV-2 RNA SPEC QL NAA+PROBE: SIGNIFICANT CHANGE UP
SODIUM SERPL-SCNC: 130 MMOL/L — LOW (ref 135–145)
SP GR SPEC: 1.01 — SIGNIFICANT CHANGE UP (ref 1–1.03)
UROBILINOGEN FLD QL: 0.2 MG/DL — SIGNIFICANT CHANGE UP (ref 0.2–1)
WBC # BLD: 8.81 K/UL — SIGNIFICANT CHANGE UP (ref 3.8–10.5)
WBC # FLD AUTO: 8.81 K/UL — SIGNIFICANT CHANGE UP (ref 3.8–10.5)

## 2023-09-03 PROCEDURE — 87086 URINE CULTURE/COLONY COUNT: CPT

## 2023-09-03 PROCEDURE — 74177 CT ABD & PELVIS W/CONTRAST: CPT | Mod: ME

## 2023-09-03 PROCEDURE — G1004: CPT

## 2023-09-03 PROCEDURE — 99285 EMERGENCY DEPT VISIT HI MDM: CPT | Mod: 25

## 2023-09-03 PROCEDURE — 74177 CT ABD & PELVIS W/CONTRAST: CPT | Mod: 26,ME

## 2023-09-03 PROCEDURE — 80053 COMPREHEN METABOLIC PANEL: CPT

## 2023-09-03 PROCEDURE — 81003 URINALYSIS AUTO W/O SCOPE: CPT

## 2023-09-03 PROCEDURE — 87637 SARSCOV2&INF A&B&RSV AMP PRB: CPT

## 2023-09-03 PROCEDURE — 36415 COLL VENOUS BLD VENIPUNCTURE: CPT

## 2023-09-03 PROCEDURE — 83735 ASSAY OF MAGNESIUM: CPT

## 2023-09-03 PROCEDURE — 99285 EMERGENCY DEPT VISIT HI MDM: CPT | Mod: FS

## 2023-09-03 PROCEDURE — 85025 COMPLETE CBC W/AUTO DIFF WBC: CPT

## 2023-09-03 RX ORDER — TRAMADOL HYDROCHLORIDE 50 MG/1
0.5 TABLET ORAL
Qty: 4.5 | Refills: 0
Start: 2023-09-03 | End: 2023-09-05

## 2023-09-03 RX ORDER — ACETAMINOPHEN 500 MG
650 TABLET ORAL ONCE
Refills: 0 | Status: COMPLETED | OUTPATIENT
Start: 2023-09-03 | End: 2023-09-03

## 2023-09-03 RX ORDER — SODIUM CHLORIDE 9 MG/ML
1000 INJECTION INTRAMUSCULAR; INTRAVENOUS; SUBCUTANEOUS ONCE
Refills: 0 | Status: COMPLETED | OUTPATIENT
Start: 2023-09-03 | End: 2023-09-03

## 2023-09-03 RX ORDER — KETOROLAC TROMETHAMINE 30 MG/ML
15 SYRINGE (ML) INJECTION ONCE
Refills: 0 | Status: COMPLETED | OUTPATIENT
Start: 2023-09-03 | End: 2023-09-03

## 2023-09-03 RX ADMIN — Medication 650 MILLIGRAM(S): at 16:22

## 2023-09-03 RX ADMIN — SODIUM CHLORIDE 2000 MILLILITER(S): 9 INJECTION INTRAMUSCULAR; INTRAVENOUS; SUBCUTANEOUS at 13:14

## 2023-09-03 NOTE — ED PROVIDER NOTE - CONSTITUTIONAL, MLM
normal... Well appearing elderly female awake, alert, oriented to person, place, time/situation and in no apparent distress.

## 2023-09-03 NOTE — ED PROVIDER NOTE - MUSCULOSKELETAL MINIMAL EXAM
+ mild TTP left buttock into left hip with FROM of hip. no midline vertebral tenderness. no calf swelling or tenderness. dp pulses equal and intact bilaterally.

## 2023-09-03 NOTE — ED PROVIDER NOTE - PATIENT PORTAL LINK FT
You can access the FollowMyHealth Patient Portal offered by [facility] by registering at the following website: [email or web address]. By joining [employer]’s FollowMyHealth portal, you will also be able to view your health information using other applications (apps) compatible with our system.

## 2023-09-03 NOTE — ED ADULT TRIAGE NOTE - WEIGHT METHOD
Amlodipine 5mg QD w/ parameters, increase as needed  F/U Echo  Cardio consult Dr. Antony   monitor BP stated

## 2023-09-03 NOTE — ED PROVIDER NOTE - PROGRESS NOTE DETAILS
Reevaluated patient at bedside.  Patient feeling much improved.  Discussed the results of all diagnostic testing in ED and copies of all available reports given.   An opportunity to ask questions was provided.  Discussed the importance of prompt, close medical follow-up.  Patient will return with any changes, concerns or persistent/worsening symptoms.  Understanding of all instructions verbalized. Ambulating in ED with some discomfort

## 2023-09-03 NOTE — ED PROVIDER NOTE - NS ED ATTENDING STATEMENT MOD
This was a shared visit with the JUAN RAMON. I reviewed and verified the documentation and independently performed the documented:

## 2023-09-03 NOTE — ED PROVIDER NOTE - OBJECTIVE STATEMENT
89-year-old female with history of hypertension and hypothyroidism presents to the ED with her son for decreased appetite, nausea and left-sided lower back pain with radiation into left hip and lower abdomen.  Patient states nausea and decreased appetite has been since this morning however pain in lower back has been for the past few days.  No fall or trauma.  Patient states she had some mild diarrhea this morning.  Denies fever, chills, chest pain, shortness of breath, vomiting, dysuria, increased frequency, hematuria.    pmd: Dr. Rebecca Stokes

## 2023-09-03 NOTE — ED PROVIDER NOTE - CLINICAL SUMMARY MEDICAL DECISION MAKING FREE TEXT BOX
I, Joel Quevedo MD, have seen and examined the patient on the date of service.  I agree with the JUAN RAMON's assessment as written, with exceptions or additions as noted below or in a separate note. pt with pmh htn, hypothyroid is here with left lower back/gluteus pain. states pain radiates towards left groin. present for past few days. has had similar symptoms before that traveled more down her leg and was told it was sciatica. no urinary complaints. no cp/sob. denies vomiting. on exam has reproducible TTP over left gluteus region. FROM b/l lower extremities. no abd TTP. likely msk back pain. however given age, will eval for other pathology such as uti, renal stone, spontaneous retroperitoneal bleed, vascular pathology. will check labs, ct, reeval.

## 2023-09-03 NOTE — ED PROVIDER NOTE - CPE EDP CARDIAC NORM
Eyes BID    atorvastatin  40 mg Oral Nightly    oxybutynin  5 mg Oral Nightly    sodium chloride flush  10 mL Intravenous 2 times per day    sodium chloride flush  10 mL Intravenous 2 times per day    docusate sodium  100 mg Oral BID    pneumococcal 13-valent conjugate  0.5 mL Intramuscular Once     Continuous Infusions:   sodium chloride 100 mL/hr at 10/30/17 1157    dextrose       PRN Meds:fentanNYL, HYDROmorphone, HYDROcodone 5 mg - acetaminophen **OR** HYDROcodone 5 mg - acetaminophen, diphenhydrAMINE, ondansetron, metoclopramide, meperidine, morphine, HYDROcodone 5 mg - acetaminophen, heparin flush, acetaminophen, ondansetron, albuterol, guaiFENesin-dextromethorphan, sodium chloride flush, simethicone, docusate sodium, sodium chloride flush, magnesium hydroxide, ondansetron, sodium chloride flush, glucose, dextrose, glucagon (rDNA), dextrose    PHYSICAL EXAM:    CURRENT VITALS: BP (!) 107/56   Pulse 94   Temp 98.6 °F (37 °C) (Temporal)   Resp 20   Ht 5' 6\" (1.676 m) Comment: Last Admission  SpO2 100%     CONSTITUTIONAL:  awake, alert, cooperative, no apparent distress,   ENT:  Normocephalic, without obvious abnormality, atraumatic, sinuses nontender on palpation, external ears without lesions,  NECK:  Supple, symmetrical, trachea midline, no adenopathy, thyroid symmetric, not enlarged and no tenderness, skin normal  LUNGS:  No increased work of breathing, good air exchange, clear to auscultation bilaterally, no crackles or wheezing  CARDIOVASCULAR:  Normal apical impulse, regular rate and rhythm, normal S1 and S2,  and 3/6 murmur noted  ABDOMEN:  Obese, Normal bowel sounds, soft, non-distended, non-tender, no masses palpated, no hepatosplenomegally  EXTREMITIES:  1+ edema, Pulses strong throughout. NEUROLOGIC:  Awake, alert, oriented to name, place and time. Following all commands and moving all extremties, somewhat confused.   SKIN:  no bruising or bleeding, normal skin color, texture, turgor and mg/dl    Performed on ACCU-CHEK      CXR:  Infiltrate/consolidation of the mid to basilar left lung,      Alix Deluca MD ,[address] [place] [address] normal...

## 2023-09-03 NOTE — ED ADULT NURSE NOTE - OBJECTIVE STATEMENT
Pt is AOX4. with c/o pain from left low back radiating over hip to left lower abdomen x2 days with nausea, dry heaving, and diarrhea. Patient denies fevers or chills. Patient denies taking anything for pain today. Pt states she is not nauseous now. Pt states that she feels she slept wrong. Pt does have a neurostimulator on R side of hip, states she cannot get an MRI. Pt is allergic to penicillin. Pt is able to speak clearly and voice needs. Denies burning or pain when voiding. States she did not take any pain medication today. Pending lab and radiology orders.   PMHx: HTN. Care continued.

## 2023-09-03 NOTE — ED ADULT NURSE REASSESSMENT NOTE - NS ED NURSE REASSESS COMMENT FT1
Pt is AOX4. c/o pain on L side of back. pt observed with steady gait with assistance to bathroom. denies n/v. Denies SOB/HA. CT results pending. Care continued.

## 2023-09-03 NOTE — ED PROVIDER NOTE - CARE PROVIDER_API CALL
Rayshawn Bee  Orthopaedic Surgery  [address] [facility], [address] [place] [address]  Phone: [phone]  Fax: [phone]  Follow Up Time: 1-3 Days

## 2023-09-03 NOTE — ED ADULT NURSE NOTE - NSSEPSISSUSPECTED_ED_A_ED
Podiatry Surgery Progress Note Patient: Monica Lujan MRN: [id number]  SSN: [id number] YOB: 1952  Age: 77 y.o. Sex: male Assessment:  
 
Patient Active Problem List  
Diagnosis Code  Diabetic foot ulcer ([facility] E11.621, L97.509  Diastolic dysfunction M85.2  Dyslipidemia E78.5  Cellulitis and abscess of foot, except toes L03.119, L02.619  Lymphedema of both lower extremities I89.0  CKD (chronic kidney disease) stage 4, GFR 15-29 ml/min ([facility]) N18.4  Morbid obesity with BMI of 70 and over, adult ([facility] E66.01, Z68.45  Type 2 diabetes mellitus with diabetic nephropathy ([facility]) E11.21  
 Erectile dysfunction N52.9  Hypothyroidism, adult E03.9  Hypogonadism in male E29.1  Borderline anemia D64.9  
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  
 Skin ulcer of ankle with fat layer exposed, right ([facility] J87.067  Non-pressure chronic ulcer of right calf with fat layer exposed ([facility] F47.116 Plan: Will hold Theraskin application today. Applied endoform and Hydrofera Blue Q 48 hrs with compression RLE 
RTC in 2 weeks for Theraskin application. He is to continue lymphedema pumps BID as instructed Total time spent with patient: 30 Minutes Care Plan discussed with: Patient and Nursing Staff Discussed:  Care Plan and home health Disposition:  Stable Mr. Zully Alvarado is a 77 y.o. male who was admitted for chron VLU with diabetic componenet RLE. Subjective:  
Past Medical History Past Medical History:  
Diagnosis Date  Anemia of chronic disease  Arthritis  Chronic renal insufficiency  Diabetes ([facility]  Dyslipidemia  Gout  Gout  Hypertension  Lymphedema  Morbid obesity ([facility] Social History Social History  Marital status:  Spouse name: N/A  
 Number of children: N/A  
 Years of education: N/A Occupational History  Not on file. Social History Main Topics  Smoking status: Never Smoker  Smokeless tobacco: Never Used  Alcohol use No  
 Drug use: No  
 Sexual activity: Not on file Other Topics Concern  Not on file Social History Narrative Current Medications Current Outpatient Prescriptions Medication Sig Dispense Refill  levothyroxine (SYNTHROID) 50 mcg tablet TAKE 1 TABLET BY MOUTH DAILY BEFORE BREAKFAST 90 Tab 0  
 testosterone (ANDRODERM) 4 mg/24 hr pt24 1 Patch by TransDERmal route daily. Max Daily Amount: 1 Patch. 60 Patch 0  
 furosemide (LASIX) 80 mg tablet TAKE 1 TABLET BY MOUTH ONCE DAILY 90 Tab 3  
 valsartan (DIOVAN) 320 mg tablet TAKE 1 TABLET BY MOUTH EVERY DAY 90 Tab 1  
 doxycycline (VIBRAMYCIN) 100 mg capsule Take 1 Cap by mouth two (2) times a day. Indications: Skin and Skin Structure Infection 14 Cap 0  
 diclofenac-miSOPROStol (ARTHROTEC 50)  mg-mcg per tablet TAKE 1 TABLET BY MOUTH TWICE DAILY 180 Tab 0  
 testosterone (ANDRODERM) 2 mg/24 hour pt24 APPLY 1 PATCH TRANSDERMALLY ONCE DAILY 90 Patch 0  
 rosuvastatin (CRESTOR) 40 mg tablet TAKE 1 TABLET BY MOUTH NIGHTLY FOR 90 DAYS. 90 Tab 1  TRULICITY 1.5 MY/2.5 mL sub-q pen INJECT 0.5ML SUBCUTANEOUSLY EVERY 7 DAYS 6 Syringe 3  
 KLOR-CON M20 20 mEq tablet TAKE 1 TABLET BY MOUTH ONCE DAILY 90 Tab 3  
 colchicine 0.6 mg tablet TAKE 1 TAB BY MOUTH DAILY AS NEEDED. 90 Tab 3  
 vit b comp & c-fa-copper-zinc (FOLBEE PLUS CZ) 5-1.5-25 mg tab Take 1 Tab by mouth daily. 90 Tab 3  
 levothyroxine (SYNTHROID) 50 mcg tablet TAKE 1 TAB BY MOUTH DAILY (BEFORE BREAKFAST) FOR 90 DAYS. 90 Tab 0  
 diclofenac-miSOPROStol (ARTHROTEC 50)  mg-mcg per tablet TAKE 1 TABLET BY MOUTH TWO (2) TIMES A DAY. 90 Tab 0  
 tadalafil (CIALIS) 20 mg tablet Take 1 Tab by mouth every thirty-six (36) hours. Indications: Erectile Dysfunction 12 Tab 5  
 diclofenac (VOLTAREN) 1 % gel Apply 2 g to affected area four (4) times daily.  2 g 0  
 dulaglutide (TRULICITY) 1.5 KY/9.1 mL sub-q pen 0.5 ML BY SUBCUTANEOUS ROUTE EVERY SEVEN (7) DAYS. 7.5 Syringe 0  
 Comp Stocking,Knee,Long,X-Lrg misc 2 Each.  COLCHICINE PO 0.6 mg.    
 gabapentin (NEURONTIN) 300 mg capsule Take 300 mg by mouth nightly.  Urea 40 % topical foam Apply  to affected area two (2) times a day. 1 Can 0  
 allopurinol (ZYLOPRIM) 100 mg tablet Take 2 Tabs by mouth daily. 30 Tab 0  
 metoprolol (LOPRESSOR) 50 mg tablet Take 1 Tab by mouth every twelve (12) hours. 60 Tab 0  Fenofibrate 150 mg cap Take 145 mg by mouth daily. Patient Allergies No Known Allergies Objective:  
General Exam 
alert, cooperative, no distress, appears stated age Vitals There were no vitals taken for this visit. REVIEW OF SYSTEMS: 
General: denies chronic fatigue, weight loss, fever, anemia, bruising, depression, nervousness, panic attacks HEENT: denies ringing in ears, ear infections, dizzy spells, poor vision, glaucoma, sinus trouble, hoarseness, eye infections GI: denies diarrhea, gas, bloating, heartburn, regurgitation, difficulty swallowing, painful swallowing, nausea, vomiting, constipation, abdominal pain, decreased appetite, blood in stools, black stools, jaundice, dark urine Lungs: denies pneumonia, asthma, cough, SOB, hemoptysis Heart: denies chest pain, irregular heart beat, ankle swelling, HTN Skin: denies rashes, hives, allergic reaction Urinary: denies UTI, kidney stones, decreased urine force and flow, urination at night, blood in urine, painful urination Bones and Joints: denies arthritis, rheumatism, back pain, gout, osteoporosis Neurologic: denies stroke, seizures, headaches, numbness, tingling Foot Exam 
VASCULAR EXAM:. Pedal pulses  2/4 DP and PT are non palpable right and left foot. Skin temperature is warm to cool right and left foot.  Digital capillary fill time is 4 seconds right and left foot. Patient has extensive lymphedema of the right and left lower extremity and feet.    NEUROLOGICAL EXAM:. Sensation is diminished to the right and left foot. Protective sensation diminished with 5.07g monofilament wire Bilateral pt can not feel wire at distal tip of all toes. Vibratory sensation diminished due to LOPS. Patient with Diabetic peripheral Neuropathy.  
   
MUSCULOSKELETAL EXAM:. There is equinus of the right and left limb.  
   
DERMATOLOGICAL EXAM:. Full thickness ulceration to the lateral aspect of the right ankle and anterior RLE. Chevis Hallmark is evidence of granulation and slough noted lateral ankle lesion. There is no probing to bone noted.  
 
Ulcer Ulcer Location: R lower leg anterior, R ankle lateral, Ulcer measurements: 1.9 cm x 2.3 cm x 0.1 cm and Ulcer base: Granular/Healthy Azul Scale: Stage 2 Wound Leg Lower Right;Lateral (Active) DRESSING STATUS Removed 8/2/2018  9:45 AM  
DRESSING TYPE Other (Comment) 8/2/2018  9:45 AM  
Non-Pressure Injury Full thickness (subcut/muscle) 8/2/2018  9:45 AM  
Wound Length (cm) 1.9 cm 8/2/2018  9:45 AM  
Wound Width (cm) 2.3 cm 8/2/2018  9:45 AM  
Wound Depth (cm) 0.3 8/2/2018  9:45 AM  
Wound Surface area (cm^2) 4.37 cm^2 8/2/2018  9:45 AM  
Condition of Base Pink;Slough 8/2/2018  9:45 AM  
Condition of Edges Open 8/2/2018  9:45 AM  
Tissue Type Pink;Yellow 8/2/2018  9:45 AM  
Tissue Type Percent Pink 40 8/2/2018  9:45 AM  
Tissue Type Percent Yellow 60 8/2/2018  9:45 AM  
Tissue Type Percent Other (comment) 10 6/7/2018 10:09 AM  
Drainage Amount  Large 8/2/2018  9:45 AM  
Drainage Color Serosanguinous 8/2/2018  9:45 AM  
Wound Odor None 8/2/2018  9:45 AM  
Periwound Skin Condition Edematous; Erythema, blanchable 8/2/2018  9:45 AM  
Cleansing and Cleansing Agents  Normal saline 8/2/2018  9:45 AM  
Dressing Type Applied Other (Comment) 7/26/2018  9:20 AM  
Number of Skin Staples Removed 5 7/19/2018  9:36 AM  
Procedure Tolerated Well 7/26/2018  9:20 AM  
Number of days:182 Wound Leg Lower Right; Anterior (Active) DRESSING STATUS Removed 7/19/2018  9:36 AM  
DRESSING TYPE Other (Comment) 7/19/2018  9:36 AM  
Non-Pressure Injury Partial thickness (epider/derm) 7/12/2018 11:40 AM  
Wound Length (cm) 0 cm 8/2/2018  9:45 AM  
Wound Width (cm) 0 cm 8/2/2018  9:45 AM  
Wound Depth (cm) 0 8/2/2018  9:45 AM  
Wound Surface area (cm^2) 0 cm^2 8/2/2018  9:45 AM  
Condition of Base Emmet 7/5/2018 10:50 AM  
Condition of Edges Closed 8/2/2018  9:45 AM  
Tissue Type Other (comment) 7/12/2018 11:40 AM  
Tissue Type Percent Pink 100 7/5/2018 10:50 AM  
Tissue Type Percent Red 100 5/23/2018 11:26 AM  
Tissue Type Percent Yellow 20 6/7/2018 10:09 AM  
Drainage Amount  None 7/19/2018  9:36 AM  
Drainage Color Serous 7/12/2018 11:40 AM  
Wound Odor None 7/19/2018  9:36 AM  
Periwound Skin Condition Intact 8/2/2018  9:45 AM  
Cleansing and Cleansing Agents  Dermal wound cleanser 7/19/2018  9:36 AM  
Dressing Type Applied Other (Comment) 7/19/2018  9:36 AM  
Number of Skin Staples Removed 6 6/28/2018  9:42 AM  
Procedure Tolerated Well 7/19/2018  9:36 AM  
Number of days:182  
   
[REMOVED] Wound Foot Left;Dorsal (Removed) Removed 02/15/18 DRESSING STATUS Removed 4/9/2015  3:26 PM  
DRESSING TYPE Other (Comment) 4/9/2015  3:26 PM  
Incision site well approximated? No 4/9/2015  3:26 PM  
 Read Only Wound Dimensions (length x width x depth) 5.5x0.5x0.8 4/9/2015  3:26 PM  
Condition of Base Pink;Granulation 4/9/2015  3:26 PM  
Condition of Edges Open 4/9/2015  3:26 PM  
Tissue Type Red;Pink 4/9/2015  3:26 PM  
Tissue Type Percent Pink 50 4/9/2015  3:26 PM  
Tissue Type Percent Red 50 4/9/2015  3:26 PM  
Drainage Amount  Small  4/9/2015  3:26 PM  
Drainage Color Serosanguinous 4/9/2015  3:26 PM  
Wound Odor None 4/9/2015  3:26 PM  
Periwound Skin Condition Edematous; Other (comment) 4/9/2015  3:26 PM  
Cleansing and Cleansing Agents  Dermal wound cleanser 4/9/2015  3:26 PM  
Dressing Type Applied Other (Comment) 4/9/2015  3:26 PM  
Procedure Tolerated Well 4/9/2015  3:26 PM  
Number of days:1081 Labs No results found for this or any previous visit (from the past 24 hour(s)). X-Ray:  deferred Procedures:   S/p Theraskin application on 9/38, 1/66, 6/07, 6/21, 7/05, 7/19 RLE. Michael Coronado DPM 
August 2, 2018 No

## 2023-09-03 NOTE — ED ADULT NURSE NOTE - ALCOHOL PRE SCREEN (AUDIT - C)
Patient presents for blood pressure check.  Patient denies any concerns.  Patient is compliant with taking medications.     Patient taking the following medications:  Current Outpatient Medications   Medication Sig Dispense Refill   • hydrochlorothiazide (HYDRODIURIL) 12.5 MG tablet Take 1 tablet by mouth daily. 30 tablet 0   • levocetirizine (XYZAL) 5 MG tablet Take 1 tablet by mouth daily.     • vitamin - therapeutic multivitamins w/minerals (CENTRUM SILVER,THERA-M) Tab Take 1 tablet by mouth daily.     • TURMERIC PO      • Omega-3 Fatty Acids (FISH OIL) 1000 MG capsule Take 2 g by mouth daily.       No current facility-administered medications for this visit.     Patients blood pressure readings:  Left-  140/98  Pulse  72     DR. Gore came in and spoke with patient, better but still alittle high we will increase the hydrochlorothiazide to 25mg qd    New medications: hydrochlorothiazide to 25mg qd  Dc'd medications: none  Test ordered: none     NOV:none    Verbal instruction provided to patient. Patient agreeable and  verbalized complete understanding.      Statement Selected

## 2023-09-03 NOTE — ED ADULT TRIAGE NOTE - CHIEF COMPLAINT QUOTE
Patient BIB son with c/o left side pain. Patient reports pain from left low back radiating over hip to left lower abdomen x2 days with nausea, dry heaving, and diarrhea. Patient denies fevers or chills. Patient denies taking anything for pain today.  PMHx: HTN

## 2023-09-03 NOTE — ED ADULT NURSE NOTE - NSFALLHARMRISKINTERV_ED_ALL_ED
Assistance OOB with selected safe patient handling equipment if applicable/Communicate risk of Fall with Harm to all staff, patient, and family/Provide visual cue: red socks, yellow wristband, yellow gown, etc/Reinforce activity limits and safety measures with patient and family/Bed in lowest position, wheels locked, appropriate side rails in place/Call bell, personal items and telephone in reach/Instruct patient to call for assistance before getting out of bed/chair/stretcher/Non-slip footwear applied when patient is off stretcher/[place] to call system/Physically safe environment - no spills, clutter or unnecessary equipment/Purposeful Proactive Rounding/Room/bathroom lighting operational, light cord in reach

## 2023-09-04 LAB
CULTURE RESULTS: SIGNIFICANT CHANGE UP
SPECIMEN SOURCE: SIGNIFICANT CHANGE UP

## 2023-11-22 ENCOUNTER — EMERGENCY (EMERGENCY)
Facility: HOSPITAL | Age: 88
LOS: 1 days | Discharge: HOME | End: 2023-11-22
Attending: STUDENT IN AN ORGANIZED HEALTH CARE EDUCATION/TRAINING PROGRAM | Admitting: STUDENT IN AN ORGANIZED HEALTH CARE EDUCATION/TRAINING PROGRAM
Payer: MEDICARE

## 2023-11-22 VITALS
SYSTOLIC BLOOD PRESSURE: 149 MMHG | HEART RATE: 98 BPM | WEIGHT: 110.89 LBS | RESPIRATION RATE: 18 BRPM | OXYGEN SATURATION: 98 % | DIASTOLIC BLOOD PRESSURE: 89 MMHG | HEIGHT: 59 IN | TEMPERATURE: 98 F

## 2023-11-22 VITALS
RESPIRATION RATE: 17 BRPM | TEMPERATURE: 98 F | HEART RATE: 90 BPM | SYSTOLIC BLOOD PRESSURE: 131 MMHG | OXYGEN SATURATION: 97 % | DIASTOLIC BLOOD PRESSURE: 88 MMHG

## 2023-11-22 DIAGNOSIS — K76.89 OTHER SPECIFIED DISEASES OF LIVER: Chronic | ICD-10-CM

## 2023-11-22 LAB
ALBUMIN SERPL ELPH-MCNC: 3.8 G/DL — SIGNIFICANT CHANGE UP (ref 3.3–5)
ALBUMIN SERPL ELPH-MCNC: 3.8 G/DL — SIGNIFICANT CHANGE UP (ref 3.3–5)
ALP SERPL-CCNC: 100 U/L — SIGNIFICANT CHANGE UP (ref 40–120)
ALP SERPL-CCNC: 100 U/L — SIGNIFICANT CHANGE UP (ref 40–120)
ALT FLD-CCNC: 18 U/L — SIGNIFICANT CHANGE UP (ref 12–78)
ALT FLD-CCNC: 18 U/L — SIGNIFICANT CHANGE UP (ref 12–78)
ANION GAP SERPL CALC-SCNC: 10 MMOL/L — SIGNIFICANT CHANGE UP (ref 5–17)
ANION GAP SERPL CALC-SCNC: 10 MMOL/L — SIGNIFICANT CHANGE UP (ref 5–17)
APPEARANCE UR: ABNORMAL
APPEARANCE UR: ABNORMAL
APTT BLD: 29 SEC — SIGNIFICANT CHANGE UP (ref 24.5–35.6)
APTT BLD: 29 SEC — SIGNIFICANT CHANGE UP (ref 24.5–35.6)
AST SERPL-CCNC: 20 U/L — SIGNIFICANT CHANGE UP (ref 15–37)
AST SERPL-CCNC: 20 U/L — SIGNIFICANT CHANGE UP (ref 15–37)
BACTERIA # UR AUTO: ABNORMAL /HPF
BACTERIA # UR AUTO: ABNORMAL /HPF
BASOPHILS # BLD AUTO: 0.03 K/UL — SIGNIFICANT CHANGE UP (ref 0–0.2)
BASOPHILS # BLD AUTO: 0.03 K/UL — SIGNIFICANT CHANGE UP (ref 0–0.2)
BASOPHILS NFR BLD AUTO: 0.2 % — SIGNIFICANT CHANGE UP (ref 0–2)
BASOPHILS NFR BLD AUTO: 0.2 % — SIGNIFICANT CHANGE UP (ref 0–2)
BILIRUB SERPL-MCNC: 0.4 MG/DL — SIGNIFICANT CHANGE UP (ref 0.2–1.2)
BILIRUB SERPL-MCNC: 0.4 MG/DL — SIGNIFICANT CHANGE UP (ref 0.2–1.2)
BILIRUB UR-MCNC: NEGATIVE — SIGNIFICANT CHANGE UP
BILIRUB UR-MCNC: NEGATIVE — SIGNIFICANT CHANGE UP
BUN SERPL-MCNC: 27 MG/DL — HIGH (ref 7–23)
BUN SERPL-MCNC: 27 MG/DL — HIGH (ref 7–23)
CALCIUM SERPL-MCNC: 9.2 MG/DL — SIGNIFICANT CHANGE UP (ref 8.5–10.1)
CALCIUM SERPL-MCNC: 9.2 MG/DL — SIGNIFICANT CHANGE UP (ref 8.5–10.1)
CHLORIDE SERPL-SCNC: 98 MMOL/L — SIGNIFICANT CHANGE UP (ref 96–108)
CHLORIDE SERPL-SCNC: 98 MMOL/L — SIGNIFICANT CHANGE UP (ref 96–108)
CO2 SERPL-SCNC: 27 MMOL/L — SIGNIFICANT CHANGE UP (ref 22–31)
CO2 SERPL-SCNC: 27 MMOL/L — SIGNIFICANT CHANGE UP (ref 22–31)
COLOR SPEC: ABNORMAL
COLOR SPEC: ABNORMAL
CREAT SERPL-MCNC: 1.1 MG/DL — SIGNIFICANT CHANGE UP (ref 0.5–1.3)
CREAT SERPL-MCNC: 1.1 MG/DL — SIGNIFICANT CHANGE UP (ref 0.5–1.3)
DIFF PNL FLD: ABNORMAL
DIFF PNL FLD: ABNORMAL
EGFR: 47 ML/MIN/1.73M2 — LOW
EGFR: 47 ML/MIN/1.73M2 — LOW
EOSINOPHIL # BLD AUTO: 0.03 K/UL — SIGNIFICANT CHANGE UP (ref 0–0.5)
EOSINOPHIL # BLD AUTO: 0.03 K/UL — SIGNIFICANT CHANGE UP (ref 0–0.5)
EOSINOPHIL NFR BLD AUTO: 0.2 % — SIGNIFICANT CHANGE UP (ref 0–6)
EOSINOPHIL NFR BLD AUTO: 0.2 % — SIGNIFICANT CHANGE UP (ref 0–6)
EPI CELLS # UR: PRESENT
EPI CELLS # UR: PRESENT
GLUCOSE SERPL-MCNC: 131 MG/DL — HIGH (ref 70–99)
GLUCOSE SERPL-MCNC: 131 MG/DL — HIGH (ref 70–99)
GLUCOSE UR QL: NEGATIVE MG/DL — SIGNIFICANT CHANGE UP
GLUCOSE UR QL: NEGATIVE MG/DL — SIGNIFICANT CHANGE UP
HCT VFR BLD CALC: 40 % — SIGNIFICANT CHANGE UP (ref 34.5–45)
HCT VFR BLD CALC: 40 % — SIGNIFICANT CHANGE UP (ref 34.5–45)
HGB BLD-MCNC: 13 G/DL — SIGNIFICANT CHANGE UP (ref 11.5–15.5)
HGB BLD-MCNC: 13 G/DL — SIGNIFICANT CHANGE UP (ref 11.5–15.5)
IMM GRANULOCYTES NFR BLD AUTO: 0.5 % — SIGNIFICANT CHANGE UP (ref 0–0.9)
IMM GRANULOCYTES NFR BLD AUTO: 0.5 % — SIGNIFICANT CHANGE UP (ref 0–0.9)
INR BLD: 0.93 RATIO — SIGNIFICANT CHANGE UP (ref 0.85–1.18)
INR BLD: 0.93 RATIO — SIGNIFICANT CHANGE UP (ref 0.85–1.18)
KETONES UR-MCNC: ABNORMAL MG/DL
KETONES UR-MCNC: ABNORMAL MG/DL
LEUKOCYTE ESTERASE UR-ACNC: ABNORMAL
LEUKOCYTE ESTERASE UR-ACNC: ABNORMAL
LYMPHOCYTES # BLD AUTO: 0.9 K/UL — LOW (ref 1–3.3)
LYMPHOCYTES # BLD AUTO: 0.9 K/UL — LOW (ref 1–3.3)
LYMPHOCYTES # BLD AUTO: 6.8 % — LOW (ref 13–44)
LYMPHOCYTES # BLD AUTO: 6.8 % — LOW (ref 13–44)
MCHC RBC-ENTMCNC: 29.3 PG — SIGNIFICANT CHANGE UP (ref 27–34)
MCHC RBC-ENTMCNC: 29.3 PG — SIGNIFICANT CHANGE UP (ref 27–34)
MCHC RBC-ENTMCNC: 32.5 GM/DL — SIGNIFICANT CHANGE UP (ref 32–36)
MCHC RBC-ENTMCNC: 32.5 GM/DL — SIGNIFICANT CHANGE UP (ref 32–36)
MCV RBC AUTO: 90.3 FL — SIGNIFICANT CHANGE UP (ref 80–100)
MCV RBC AUTO: 90.3 FL — SIGNIFICANT CHANGE UP (ref 80–100)
MONOCYTES # BLD AUTO: 1.02 K/UL — HIGH (ref 0–0.9)
MONOCYTES # BLD AUTO: 1.02 K/UL — HIGH (ref 0–0.9)
MONOCYTES NFR BLD AUTO: 7.7 % — SIGNIFICANT CHANGE UP (ref 2–14)
MONOCYTES NFR BLD AUTO: 7.7 % — SIGNIFICANT CHANGE UP (ref 2–14)
NEUTROPHILS # BLD AUTO: 11.23 K/UL — HIGH (ref 1.8–7.4)
NEUTROPHILS # BLD AUTO: 11.23 K/UL — HIGH (ref 1.8–7.4)
NEUTROPHILS NFR BLD AUTO: 84.6 % — HIGH (ref 43–77)
NEUTROPHILS NFR BLD AUTO: 84.6 % — HIGH (ref 43–77)
NITRITE UR-MCNC: NEGATIVE — SIGNIFICANT CHANGE UP
NITRITE UR-MCNC: NEGATIVE — SIGNIFICANT CHANGE UP
NRBC # BLD: 0 /100 WBCS — SIGNIFICANT CHANGE UP (ref 0–0)
NRBC # BLD: 0 /100 WBCS — SIGNIFICANT CHANGE UP (ref 0–0)
PH UR: 6.5 — SIGNIFICANT CHANGE UP (ref 5–8)
PH UR: 6.5 — SIGNIFICANT CHANGE UP (ref 5–8)
PLATELET # BLD AUTO: 257 K/UL — SIGNIFICANT CHANGE UP (ref 150–400)
PLATELET # BLD AUTO: 257 K/UL — SIGNIFICANT CHANGE UP (ref 150–400)
POTASSIUM SERPL-MCNC: 4.3 MMOL/L — SIGNIFICANT CHANGE UP (ref 3.5–5.3)
POTASSIUM SERPL-MCNC: 4.3 MMOL/L — SIGNIFICANT CHANGE UP (ref 3.5–5.3)
POTASSIUM SERPL-SCNC: 4.3 MMOL/L — SIGNIFICANT CHANGE UP (ref 3.5–5.3)
POTASSIUM SERPL-SCNC: 4.3 MMOL/L — SIGNIFICANT CHANGE UP (ref 3.5–5.3)
PROT SERPL-MCNC: 7.8 G/DL — SIGNIFICANT CHANGE UP (ref 6–8.3)
PROT SERPL-MCNC: 7.8 G/DL — SIGNIFICANT CHANGE UP (ref 6–8.3)
PROT UR-MCNC: >=1000 MG/DL
PROT UR-MCNC: >=1000 MG/DL
PROTHROM AB SERPL-ACNC: 10.9 SEC — SIGNIFICANT CHANGE UP (ref 9.5–13)
PROTHROM AB SERPL-ACNC: 10.9 SEC — SIGNIFICANT CHANGE UP (ref 9.5–13)
RBC # BLD: 4.43 M/UL — SIGNIFICANT CHANGE UP (ref 3.8–5.2)
RBC # BLD: 4.43 M/UL — SIGNIFICANT CHANGE UP (ref 3.8–5.2)
RBC # FLD: 13.8 % — SIGNIFICANT CHANGE UP (ref 10.3–14.5)
RBC # FLD: 13.8 % — SIGNIFICANT CHANGE UP (ref 10.3–14.5)
RBC CASTS # UR COMP ASSIST: ABNORMAL /HPF
RBC CASTS # UR COMP ASSIST: ABNORMAL /HPF
SODIUM SERPL-SCNC: 135 MMOL/L — SIGNIFICANT CHANGE UP (ref 135–145)
SODIUM SERPL-SCNC: 135 MMOL/L — SIGNIFICANT CHANGE UP (ref 135–145)
SP GR SPEC: 1.02 — SIGNIFICANT CHANGE UP (ref 1–1.03)
SP GR SPEC: 1.02 — SIGNIFICANT CHANGE UP (ref 1–1.03)
UROBILINOGEN FLD QL: 1 MG/DL — SIGNIFICANT CHANGE UP (ref 0.2–1)
UROBILINOGEN FLD QL: 1 MG/DL — SIGNIFICANT CHANGE UP (ref 0.2–1)
WBC # BLD: 13.27 K/UL — HIGH (ref 3.8–10.5)
WBC # BLD: 13.27 K/UL — HIGH (ref 3.8–10.5)
WBC # FLD AUTO: 13.27 K/UL — HIGH (ref 3.8–10.5)
WBC # FLD AUTO: 13.27 K/UL — HIGH (ref 3.8–10.5)
WBC UR QL: 8 /HPF — HIGH (ref 0–5)
WBC UR QL: 8 /HPF — HIGH (ref 0–5)

## 2023-11-22 PROCEDURE — 36415 COLL VENOUS BLD VENIPUNCTURE: CPT

## 2023-11-22 PROCEDURE — 87186 SC STD MICRODIL/AGAR DIL: CPT

## 2023-11-22 PROCEDURE — 85025 COMPLETE CBC W/AUTO DIFF WBC: CPT

## 2023-11-22 PROCEDURE — 85730 THROMBOPLASTIN TIME PARTIAL: CPT

## 2023-11-22 PROCEDURE — 85610 PROTHROMBIN TIME: CPT

## 2023-11-22 PROCEDURE — 99284 EMERGENCY DEPT VISIT MOD MDM: CPT | Mod: 25

## 2023-11-22 PROCEDURE — 80053 COMPREHEN METABOLIC PANEL: CPT

## 2023-11-22 PROCEDURE — 81001 URINALYSIS AUTO W/SCOPE: CPT

## 2023-11-22 PROCEDURE — 74177 CT ABD & PELVIS W/CONTRAST: CPT | Mod: 26,MA

## 2023-11-22 PROCEDURE — 99285 EMERGENCY DEPT VISIT HI MDM: CPT

## 2023-11-22 PROCEDURE — 87086 URINE CULTURE/COLONY COUNT: CPT

## 2023-11-22 PROCEDURE — 74177 CT ABD & PELVIS W/CONTRAST: CPT | Mod: MA

## 2023-11-22 NOTE — ED PROVIDER NOTE - PHYSICAL EXAMINATION
Gen: Well appearing in NAD  Head: NC/AT  Neck: trachea midline  Resp:  No distress  abd nontender, no cvat   Ext: no deformities  Neuro:  A&O appears non focal  Skin:  Warm and dry as visualized  Psych:  Normal affect and mood

## 2023-11-22 NOTE — ED PROVIDER NOTE - PATIENT PORTAL LINK FT
You can access the FollowMyHealth Patient Portal offered by [facility] by registering at the following website: [email or web address]. By joining [employer]’s FollowMyHealth portal, you will also be able to view your health information using other applications (apps) compatible with our system.

## 2023-11-22 NOTE — ED ADULT NURSE NOTE - CAS DISCH BELONGINGS RETURNED
Not applicable Benzoyl Peroxide Counseling: Patient counseled that medicine may cause skin irritation and bleach clothing.  In the event of skin irritation, the patient was advised to reduce the amount of the drug applied or use it less frequently.   The patient verbalized understanding of the proper use and possible adverse effects of benzoyl peroxide.  All of the patient's questions and concerns were addressed.

## 2023-11-22 NOTE — ED ADULT NURSE NOTE - OBJECTIVE STATEMENT
AOx4, RR even and unlabored, not in distress. Reports of urinary symptoms frequency and dysuria that started this morning. She spoke to her PCP over the phone and was prescribed Bactrim BID, started the med at 1500H today. This evening at 1800H, she noticed blood in her urine.     PT claims that she has UTI

## 2023-11-22 NOTE — ED ADULT NURSE NOTE - NSFALLHARMRISKINTERV_ED_ALL_ED

## 2023-11-22 NOTE — ED PROVIDER NOTE - OBJECTIVE STATEMENT
89yo F ho htn pw urinary sx, dysuria, frequency. called her pcp and was given rx for bactrim, she took 1 pill and then tonight noted some blood in her urine so was alarmed and came to ED. no fevres, chills, nausea, vomiting, a bd pain or flank pain.

## 2023-11-22 NOTE — ED ADULT TRIAGE NOTE - CHIEF COMPLAINT QUOTE
89 y/o female received to triage. Alert and oriented x4. C/o "I had burning and UTI symptoms this morning and my PCP gave me antibiotics for a UTI. I took 1 pill this morning and I noticed I had some blood in my urine when I urinated earlier." Pt denies any cp, sob, n/v/d, dizziness, headache, fever/chills. Respirations even and unlabored.

## 2023-11-22 NOTE — ED PROVIDER NOTE - CLINICAL SUMMARY MEDICAL DECISION MAKING FREE TEXT BOX
89yo F ho htn pw urinary sx, dysuria, frequency. called her pcp and was given rx for bactrim, she took 1 pill and then tonight noted some blood in her urine/dark urine  so was alarmed and came to ED. no fevres, chills, nausea, vomiting, a bd pain or flank pain.  urinary sx wihtout systemic symptoms with hematuria, likely uti, will check ua/cx, cw abx, labs and CT given [place]/gross hematuria

## 2023-11-22 NOTE — ED ADULT NURSE NOTE - CHIEF COMPLAINT QUOTE
89 y/o female received to triage. Alert and oriented x4. C/o "I had burning and UTI symptoms this morning and my PCP gave me antibiotics for a UTI. I took 1 pill this morning and I noticed I had some blood in my urine when I urinated earlier." Pt denies any cp, sob, n/v/d, dizziness, headache, fever/chills. Respirations even and unlabored.

## 2023-11-22 NOTE — ED PROVIDER NOTE - NSFOLLOWUPINSTRUCTIONS_ED_ALL_ED_FT
1. TAKE ALL MEDICATIONS AS DIRECTED.    2. FOR PAIN OR FEVER YOU CAN TAKE IBUPROFEN (MOTRIN, ADVIL) OR ACETAMINOPHEN (TYLENOL) AS NEEDED, AS DIRECTED ON PACKAGING.  3. FOLLOW UP WITH YOUR PRIMARY DOCTOR WITHIN 5 DAYS AS DIRECTED.  4. IF YOU HAD LABS OR IMAGING DONE, YOU WERE GIVEN COPIES OF ALL LABS AND/OR IMAGING RESULTS FROM YOUR ER VISIT--PLEASE TAKE THEM WITH YOU TO YOUR FOLLOW UP APPOINTMENTS.  5. IF NEEDED, CALL PATIENT ACCESS SERVICES AT [phone] TO FIND A PRIMARY CARE PHYSICIAN.  OR CALL [phone] TO MAKE AN APPOINTMENT WITH THE CLINIC.  6. RETURN TO THE ER FOR ANY WORSENING SYMPTOMS OR CONCERNS.    COntinue with antibiotics as directed  Follow up with urology  Return to ED for worsening symptoms, fevers, chills, nausea, vomiting, flank pain    Urinary Tract Infection, Adult  ImageA urinary tract infection (UTI) is an infection of any part of the urinary tract, which includes the kidneys, ureters, bladder, and urethra. These organs make, store, and get rid of urine in the body. UTI can be a bladder infection (cystitis) or kidney infection (pyelonephritis).    What are the causes?  This infection may be caused by fungi, viruses, or bacteria. Bacteria are the most common cause of UTIs. This condition can also be caused by repeated incomplete emptying of the bladder during urination.    What increases the risk?  This condition is more likely to develop if:    You ignore your need to urinate or hold urine for long periods of time.  You do not empty your bladder completely during urination.  You wipe back to front after urinating or having a bowel movement, if you are female.  You are uncircumcised, if you are male.  You are constipated.  You have a urinary catheter that stays in place (indwelling).  You have a weak defense (immune) system.  You have a medical condition that affects your bowels, kidneys, or bladder.  You have diabetes.  You take antibiotic medicines frequently or for long periods of time, and the antibiotics no longer work well against certain types of infections (antibiotic resistance).  You take medicines that irritate your urinary tract.  You are exposed to chemicals that irritate your urinary tract.  You are female.    What are the signs or symptoms?  Symptoms of this condition include:    Fever.  Frequent urination or passing small amounts of urine frequently.  Needing to urinate urgently.  Pain or burning with urination.  Urine that smells bad or unusual.  Cloudy urine.  Pain in the lower abdomen or back.  Trouble urinating.  Blood in the urine.  Vomiting or being less hungry than normal.  Diarrhea or abdominal pain.  Vaginal discharge, if you are female.    How is this diagnosed?  This condition is diagnosed with a medical history and physical exam. You will also need to provide a urine sample to test your urine. Other tests may be done, including:    Blood tests.  Sexually transmitted disease (STD) testing.    If you have had more than one UTI, a cystoscopy or imaging studies may be done to determine the cause of the infections.    How is this treated?  Treatment for this condition often includes a combination of two or more of the following:    Antibiotic medicine.  Other medicines to treat less common causes of UTI.  Over-the-counter medicines to treat pain.  Drinking enough water to stay hydrated.    Follow these instructions at home:  Take over-the-counter and prescription medicines only as told by your health care provider.  If you were prescribed an antibiotic, take it as told by your health care provider. Do not stop taking the antibiotic even if you start to feel better.  Avoid alcohol, caffeine, tea, and carbonated beverages. They can irritate your bladder.  Drink enough fluid to keep your urine clear or pale yellow.  Keep all follow-up visits as told by your health care provider. This is important.  ImageMake sure to:    Empty your bladder often and completely. Do not hold urine for long periods of time.  Empty your bladder before and after sex.  Wipe from front to back after a bowel movement if you are female. Use each tissue one time when you wipe.    Contact a health care provider if:  You have back pain.  You have a fever.  You feel nauseous or vomit.  Your symptoms do not get better after 3 days.  Your symptoms go away and then return.  Get help right away if:  You have severe back pain or lower abdominal pain.  You are vomiting and cannot keep down any medicines or water.  This information is not intended to replace advice given to you by your health care provider. Make sure you discuss any questions you have with your health care provider.

## 2023-11-25 LAB
-  AMOXICILLIN/CLAVULANIC ACID: SIGNIFICANT CHANGE UP
-  AMOXICILLIN/CLAVULANIC ACID: SIGNIFICANT CHANGE UP
-  AMPICILLIN/SULBACTAM: SIGNIFICANT CHANGE UP
-  AMPICILLIN/SULBACTAM: SIGNIFICANT CHANGE UP
-  AMPICILLIN: SIGNIFICANT CHANGE UP
-  AMPICILLIN: SIGNIFICANT CHANGE UP
-  AZTREONAM: SIGNIFICANT CHANGE UP
-  AZTREONAM: SIGNIFICANT CHANGE UP
-  CEFAZOLIN: SIGNIFICANT CHANGE UP
-  CEFAZOLIN: SIGNIFICANT CHANGE UP
-  CEFEPIME: SIGNIFICANT CHANGE UP
-  CEFEPIME: SIGNIFICANT CHANGE UP
-  CEFOXITIN: SIGNIFICANT CHANGE UP
-  CEFOXITIN: SIGNIFICANT CHANGE UP
-  CEFTRIAXONE: SIGNIFICANT CHANGE UP
-  CEFTRIAXONE: SIGNIFICANT CHANGE UP
-  CEFUROXIME: SIGNIFICANT CHANGE UP
-  CEFUROXIME: SIGNIFICANT CHANGE UP
-  CIPROFLOXACIN: SIGNIFICANT CHANGE UP
-  CIPROFLOXACIN: SIGNIFICANT CHANGE UP
-  ERTAPENEM: SIGNIFICANT CHANGE UP
-  ERTAPENEM: SIGNIFICANT CHANGE UP
-  GENTAMICIN: SIGNIFICANT CHANGE UP
-  GENTAMICIN: SIGNIFICANT CHANGE UP
-  IMIPENEM: SIGNIFICANT CHANGE UP
-  IMIPENEM: SIGNIFICANT CHANGE UP
-  LEVOFLOXACIN: SIGNIFICANT CHANGE UP
-  LEVOFLOXACIN: SIGNIFICANT CHANGE UP
-  MEROPENEM: SIGNIFICANT CHANGE UP
-  MEROPENEM: SIGNIFICANT CHANGE UP
-  NITROFURANTOIN: SIGNIFICANT CHANGE UP
-  NITROFURANTOIN: SIGNIFICANT CHANGE UP
-  PIPERACILLIN/TAZOBACTAM: SIGNIFICANT CHANGE UP
-  PIPERACILLIN/TAZOBACTAM: SIGNIFICANT CHANGE UP
-  TOBRAMYCIN: SIGNIFICANT CHANGE UP
-  TOBRAMYCIN: SIGNIFICANT CHANGE UP
-  TRIMETHOPRIM/SULFAMETHOXAZOLE: SIGNIFICANT CHANGE UP
-  TRIMETHOPRIM/SULFAMETHOXAZOLE: SIGNIFICANT CHANGE UP
CULTURE RESULTS: ABNORMAL
CULTURE RESULTS: ABNORMAL
METHOD TYPE: SIGNIFICANT CHANGE UP
METHOD TYPE: SIGNIFICANT CHANGE UP
ORGANISM # SPEC MICROSCOPIC CNT: ABNORMAL
ORGANISM # SPEC MICROSCOPIC CNT: ABNORMAL
ORGANISM # SPEC MICROSCOPIC CNT: SIGNIFICANT CHANGE UP
ORGANISM # SPEC MICROSCOPIC CNT: SIGNIFICANT CHANGE UP
SPECIMEN SOURCE: SIGNIFICANT CHANGE UP
SPECIMEN SOURCE: SIGNIFICANT CHANGE UP

## 2023-11-28 ENCOUNTER — EMERGENCY (EMERGENCY)
Facility: HOSPITAL | Age: 88
LOS: 1 days | Discharge: HOME | End: 2023-11-28
Attending: STUDENT IN AN ORGANIZED HEALTH CARE EDUCATION/TRAINING PROGRAM | Admitting: STUDENT IN AN ORGANIZED HEALTH CARE EDUCATION/TRAINING PROGRAM
Payer: MEDICARE

## 2023-11-28 VITALS
DIASTOLIC BLOOD PRESSURE: 95 MMHG | SYSTOLIC BLOOD PRESSURE: 183 MMHG | HEART RATE: 88 BPM | TEMPERATURE: 98 F | OXYGEN SATURATION: 98 % | HEIGHT: 59 IN | WEIGHT: 110.89 LBS | RESPIRATION RATE: 16 BRPM

## 2023-11-28 VITALS
HEART RATE: 93 BPM | SYSTOLIC BLOOD PRESSURE: 162 MMHG | RESPIRATION RATE: 17 BRPM | OXYGEN SATURATION: 97 % | DIASTOLIC BLOOD PRESSURE: 82 MMHG | TEMPERATURE: 98 F

## 2023-11-28 DIAGNOSIS — K76.89 OTHER SPECIFIED DISEASES OF LIVER: Chronic | ICD-10-CM

## 2023-11-28 LAB
ALBUMIN SERPL ELPH-MCNC: 3.9 G/DL — SIGNIFICANT CHANGE UP (ref 3.3–5)
ALBUMIN SERPL ELPH-MCNC: 3.9 G/DL — SIGNIFICANT CHANGE UP (ref 3.3–5)
ALP SERPL-CCNC: 92 U/L — SIGNIFICANT CHANGE UP (ref 40–120)
ALP SERPL-CCNC: 92 U/L — SIGNIFICANT CHANGE UP (ref 40–120)
ALT FLD-CCNC: 25 U/L — SIGNIFICANT CHANGE UP (ref 12–78)
ALT FLD-CCNC: 25 U/L — SIGNIFICANT CHANGE UP (ref 12–78)
ANION GAP SERPL CALC-SCNC: 10 MMOL/L — SIGNIFICANT CHANGE UP (ref 5–17)
ANION GAP SERPL CALC-SCNC: 10 MMOL/L — SIGNIFICANT CHANGE UP (ref 5–17)
ANION GAP SERPL CALC-SCNC: 11 MMOL/L — SIGNIFICANT CHANGE UP (ref 5–17)
ANION GAP SERPL CALC-SCNC: 11 MMOL/L — SIGNIFICANT CHANGE UP (ref 5–17)
AST SERPL-CCNC: 29 U/L — SIGNIFICANT CHANGE UP (ref 15–37)
AST SERPL-CCNC: 29 U/L — SIGNIFICANT CHANGE UP (ref 15–37)
BASOPHILS # BLD AUTO: 0.02 K/UL — SIGNIFICANT CHANGE UP (ref 0–0.2)
BASOPHILS # BLD AUTO: 0.02 K/UL — SIGNIFICANT CHANGE UP (ref 0–0.2)
BASOPHILS NFR BLD AUTO: 0.3 % — SIGNIFICANT CHANGE UP (ref 0–2)
BASOPHILS NFR BLD AUTO: 0.3 % — SIGNIFICANT CHANGE UP (ref 0–2)
BILIRUB SERPL-MCNC: 0.4 MG/DL — SIGNIFICANT CHANGE UP (ref 0.2–1.2)
BILIRUB SERPL-MCNC: 0.4 MG/DL — SIGNIFICANT CHANGE UP (ref 0.2–1.2)
BUN SERPL-MCNC: 15 MG/DL — SIGNIFICANT CHANGE UP (ref 7–23)
BUN SERPL-MCNC: 15 MG/DL — SIGNIFICANT CHANGE UP (ref 7–23)
BUN SERPL-MCNC: 18 MG/DL — SIGNIFICANT CHANGE UP (ref 7–23)
BUN SERPL-MCNC: 18 MG/DL — SIGNIFICANT CHANGE UP (ref 7–23)
CALCIUM SERPL-MCNC: 8 MG/DL — LOW (ref 8.5–10.1)
CALCIUM SERPL-MCNC: 8 MG/DL — LOW (ref 8.5–10.1)
CALCIUM SERPL-MCNC: 8.9 MG/DL — SIGNIFICANT CHANGE UP (ref 8.5–10.1)
CALCIUM SERPL-MCNC: 8.9 MG/DL — SIGNIFICANT CHANGE UP (ref 8.5–10.1)
CHLORIDE SERPL-SCNC: 91 MMOL/L — LOW (ref 96–108)
CHLORIDE SERPL-SCNC: 91 MMOL/L — LOW (ref 96–108)
CHLORIDE SERPL-SCNC: 97 MMOL/L — SIGNIFICANT CHANGE UP (ref 96–108)
CHLORIDE SERPL-SCNC: 97 MMOL/L — SIGNIFICANT CHANGE UP (ref 96–108)
CO2 SERPL-SCNC: 22 MMOL/L — SIGNIFICANT CHANGE UP (ref 22–31)
CO2 SERPL-SCNC: 22 MMOL/L — SIGNIFICANT CHANGE UP (ref 22–31)
CO2 SERPL-SCNC: 23 MMOL/L — SIGNIFICANT CHANGE UP (ref 22–31)
CO2 SERPL-SCNC: 23 MMOL/L — SIGNIFICANT CHANGE UP (ref 22–31)
CREAT SERPL-MCNC: 1.1 MG/DL — SIGNIFICANT CHANGE UP (ref 0.5–1.3)
CREAT SERPL-MCNC: 1.1 MG/DL — SIGNIFICANT CHANGE UP (ref 0.5–1.3)
CREAT SERPL-MCNC: 1.3 MG/DL — SIGNIFICANT CHANGE UP (ref 0.5–1.3)
CREAT SERPL-MCNC: 1.3 MG/DL — SIGNIFICANT CHANGE UP (ref 0.5–1.3)
EGFR: 38 ML/MIN/1.73M2 — LOW
EGFR: 38 ML/MIN/1.73M2 — LOW
EGFR: 47 ML/MIN/1.73M2 — LOW
EGFR: 47 ML/MIN/1.73M2 — LOW
EOSINOPHIL # BLD AUTO: 0.02 K/UL — SIGNIFICANT CHANGE UP (ref 0–0.5)
EOSINOPHIL # BLD AUTO: 0.02 K/UL — SIGNIFICANT CHANGE UP (ref 0–0.5)
EOSINOPHIL NFR BLD AUTO: 0.3 % — SIGNIFICANT CHANGE UP (ref 0–6)
EOSINOPHIL NFR BLD AUTO: 0.3 % — SIGNIFICANT CHANGE UP (ref 0–6)
FLUAV AG NPH QL: SIGNIFICANT CHANGE UP
FLUAV AG NPH QL: SIGNIFICANT CHANGE UP
FLUBV AG NPH QL: SIGNIFICANT CHANGE UP
FLUBV AG NPH QL: SIGNIFICANT CHANGE UP
GLUCOSE SERPL-MCNC: 112 MG/DL — HIGH (ref 70–99)
GLUCOSE SERPL-MCNC: 112 MG/DL — HIGH (ref 70–99)
GLUCOSE SERPL-MCNC: 96 MG/DL — SIGNIFICANT CHANGE UP (ref 70–99)
GLUCOSE SERPL-MCNC: 96 MG/DL — SIGNIFICANT CHANGE UP (ref 70–99)
HCT VFR BLD CALC: 38.1 % — SIGNIFICANT CHANGE UP (ref 34.5–45)
HCT VFR BLD CALC: 38.1 % — SIGNIFICANT CHANGE UP (ref 34.5–45)
HGB BLD-MCNC: 13.1 G/DL — SIGNIFICANT CHANGE UP (ref 11.5–15.5)
HGB BLD-MCNC: 13.1 G/DL — SIGNIFICANT CHANGE UP (ref 11.5–15.5)
IMM GRANULOCYTES NFR BLD AUTO: 0.9 % — SIGNIFICANT CHANGE UP (ref 0–0.9)
IMM GRANULOCYTES NFR BLD AUTO: 0.9 % — SIGNIFICANT CHANGE UP (ref 0–0.9)
LYMPHOCYTES # BLD AUTO: 0.38 K/UL — LOW (ref 1–3.3)
LYMPHOCYTES # BLD AUTO: 0.38 K/UL — LOW (ref 1–3.3)
LYMPHOCYTES # BLD AUTO: 5 % — LOW (ref 13–44)
LYMPHOCYTES # BLD AUTO: 5 % — LOW (ref 13–44)
MAGNESIUM SERPL-MCNC: 1.9 MG/DL — SIGNIFICANT CHANGE UP (ref 1.6–2.6)
MAGNESIUM SERPL-MCNC: 1.9 MG/DL — SIGNIFICANT CHANGE UP (ref 1.6–2.6)
MCHC RBC-ENTMCNC: 30 PG — SIGNIFICANT CHANGE UP (ref 27–34)
MCHC RBC-ENTMCNC: 30 PG — SIGNIFICANT CHANGE UP (ref 27–34)
MCHC RBC-ENTMCNC: 34.4 GM/DL — SIGNIFICANT CHANGE UP (ref 32–36)
MCHC RBC-ENTMCNC: 34.4 GM/DL — SIGNIFICANT CHANGE UP (ref 32–36)
MCV RBC AUTO: 87.2 FL — SIGNIFICANT CHANGE UP (ref 80–100)
MCV RBC AUTO: 87.2 FL — SIGNIFICANT CHANGE UP (ref 80–100)
MONOCYTES # BLD AUTO: 0.9 K/UL — SIGNIFICANT CHANGE UP (ref 0–0.9)
MONOCYTES # BLD AUTO: 0.9 K/UL — SIGNIFICANT CHANGE UP (ref 0–0.9)
MONOCYTES NFR BLD AUTO: 11.8 % — SIGNIFICANT CHANGE UP (ref 2–14)
MONOCYTES NFR BLD AUTO: 11.8 % — SIGNIFICANT CHANGE UP (ref 2–14)
NEUTROPHILS # BLD AUTO: 6.21 K/UL — SIGNIFICANT CHANGE UP (ref 1.8–7.4)
NEUTROPHILS # BLD AUTO: 6.21 K/UL — SIGNIFICANT CHANGE UP (ref 1.8–7.4)
NEUTROPHILS NFR BLD AUTO: 81.7 % — HIGH (ref 43–77)
NEUTROPHILS NFR BLD AUTO: 81.7 % — HIGH (ref 43–77)
NRBC # BLD: 0 /100 WBCS — SIGNIFICANT CHANGE UP (ref 0–0)
NRBC # BLD: 0 /100 WBCS — SIGNIFICANT CHANGE UP (ref 0–0)
PHOSPHATE SERPL-MCNC: 2.7 MG/DL — SIGNIFICANT CHANGE UP (ref 2.5–4.5)
PHOSPHATE SERPL-MCNC: 2.7 MG/DL — SIGNIFICANT CHANGE UP (ref 2.5–4.5)
PLATELET # BLD AUTO: 233 K/UL — SIGNIFICANT CHANGE UP (ref 150–400)
PLATELET # BLD AUTO: 233 K/UL — SIGNIFICANT CHANGE UP (ref 150–400)
POTASSIUM SERPL-MCNC: 4.5 MMOL/L — SIGNIFICANT CHANGE UP (ref 3.5–5.3)
POTASSIUM SERPL-MCNC: 4.5 MMOL/L — SIGNIFICANT CHANGE UP (ref 3.5–5.3)
POTASSIUM SERPL-MCNC: 5 MMOL/L — SIGNIFICANT CHANGE UP (ref 3.5–5.3)
POTASSIUM SERPL-MCNC: 5 MMOL/L — SIGNIFICANT CHANGE UP (ref 3.5–5.3)
POTASSIUM SERPL-SCNC: 4.5 MMOL/L — SIGNIFICANT CHANGE UP (ref 3.5–5.3)
POTASSIUM SERPL-SCNC: 4.5 MMOL/L — SIGNIFICANT CHANGE UP (ref 3.5–5.3)
POTASSIUM SERPL-SCNC: 5 MMOL/L — SIGNIFICANT CHANGE UP (ref 3.5–5.3)
POTASSIUM SERPL-SCNC: 5 MMOL/L — SIGNIFICANT CHANGE UP (ref 3.5–5.3)
PROT SERPL-MCNC: 7.6 G/DL — SIGNIFICANT CHANGE UP (ref 6–8.3)
PROT SERPL-MCNC: 7.6 G/DL — SIGNIFICANT CHANGE UP (ref 6–8.3)
RBC # BLD: 4.37 M/UL — SIGNIFICANT CHANGE UP (ref 3.8–5.2)
RBC # BLD: 4.37 M/UL — SIGNIFICANT CHANGE UP (ref 3.8–5.2)
RBC # FLD: 13.4 % — SIGNIFICANT CHANGE UP (ref 10.3–14.5)
RBC # FLD: 13.4 % — SIGNIFICANT CHANGE UP (ref 10.3–14.5)
RSV RNA NPH QL NAA+NON-PROBE: SIGNIFICANT CHANGE UP
RSV RNA NPH QL NAA+NON-PROBE: SIGNIFICANT CHANGE UP
SARS-COV-2 RNA SPEC QL NAA+PROBE: SIGNIFICANT CHANGE UP
SARS-COV-2 RNA SPEC QL NAA+PROBE: SIGNIFICANT CHANGE UP
SODIUM SERPL-SCNC: 125 MMOL/L — LOW (ref 135–145)
SODIUM SERPL-SCNC: 125 MMOL/L — LOW (ref 135–145)
SODIUM SERPL-SCNC: 129 MMOL/L — LOW (ref 135–145)
SODIUM SERPL-SCNC: 129 MMOL/L — LOW (ref 135–145)
TROPONIN I, HIGH SENSITIVITY RESULT: 14.4 NG/L — SIGNIFICANT CHANGE UP
TROPONIN I, HIGH SENSITIVITY RESULT: 14.4 NG/L — SIGNIFICANT CHANGE UP
TSH SERPL-MCNC: 1.55 UIU/ML — SIGNIFICANT CHANGE UP (ref 0.36–3.74)
TSH SERPL-MCNC: 1.55 UIU/ML — SIGNIFICANT CHANGE UP (ref 0.36–3.74)
WBC # BLD: 7.6 K/UL — SIGNIFICANT CHANGE UP (ref 3.8–10.5)
WBC # BLD: 7.6 K/UL — SIGNIFICANT CHANGE UP (ref 3.8–10.5)
WBC # FLD AUTO: 7.6 K/UL — SIGNIFICANT CHANGE UP (ref 3.8–10.5)
WBC # FLD AUTO: 7.6 K/UL — SIGNIFICANT CHANGE UP (ref 3.8–10.5)

## 2023-11-28 PROCEDURE — 84443 ASSAY THYROID STIM HORMONE: CPT

## 2023-11-28 PROCEDURE — 80053 COMPREHEN METABOLIC PANEL: CPT

## 2023-11-28 PROCEDURE — 84484 ASSAY OF TROPONIN QUANT: CPT

## 2023-11-28 PROCEDURE — 80048 BASIC METABOLIC PNL TOTAL CA: CPT

## 2023-11-28 PROCEDURE — 36415 COLL VENOUS BLD VENIPUNCTURE: CPT

## 2023-11-28 PROCEDURE — 99285 EMERGENCY DEPT VISIT HI MDM: CPT | Mod: 25

## 2023-11-28 PROCEDURE — 85025 COMPLETE CBC W/AUTO DIFF WBC: CPT

## 2023-11-28 PROCEDURE — 71045 X-RAY EXAM CHEST 1 VIEW: CPT | Mod: 26

## 2023-11-28 PROCEDURE — 84100 ASSAY OF PHOSPHORUS: CPT

## 2023-11-28 PROCEDURE — 71045 X-RAY EXAM CHEST 1 VIEW: CPT

## 2023-11-28 PROCEDURE — 93005 ELECTROCARDIOGRAM TRACING: CPT

## 2023-11-28 PROCEDURE — 99285 EMERGENCY DEPT VISIT HI MDM: CPT

## 2023-11-28 PROCEDURE — 93010 ELECTROCARDIOGRAM REPORT: CPT

## 2023-11-28 PROCEDURE — 83735 ASSAY OF MAGNESIUM: CPT

## 2023-11-28 PROCEDURE — 87637 SARSCOV2&INF A&B&RSV AMP PRB: CPT

## 2023-11-28 RX ORDER — SODIUM CHLORIDE 9 MG/ML
500 INJECTION INTRAMUSCULAR; INTRAVENOUS; SUBCUTANEOUS ONCE
Refills: 0 | Status: COMPLETED | OUTPATIENT
Start: 2023-11-28 | End: 2023-11-28

## 2023-11-28 RX ADMIN — SODIUM CHLORIDE 500 MILLILITER(S): 9 INJECTION INTRAMUSCULAR; INTRAVENOUS; SUBCUTANEOUS at 17:19

## 2023-11-28 NOTE — ED PROVIDER NOTE - PHYSICAL EXAMINATION
GENERAL: no acute distress; mildly anxious   HEAD:  Atraumatic, Normocephalic  EYES: EOMI, PERRLA,  ENT: MMM; oropharynx clear  NECK: Supple, No JVD  CHEST/LUNG: Clear to auscultation bilaterally; No wheeze  HEART: Regular rate and rhythm; No murmurs, rubs, or gallops  ABDOMEN: Soft, Nontender, Nondistended; Bowel sounds present  EXTREMITIES:  2+ Peripheral Pulses, No clubbing, cyanosis, or edema  PSYCH: AAOx3  NEUROLOGY: no focal motor or sensory deficits. 5/5 muscle strength in all extremities. +Mild Tremor   SKIN: No rashes or lesions

## 2023-11-28 NOTE — ED PROVIDER NOTE - PATIENT PORTAL LINK FT
You can access the FollowMyHealth Patient Portal offered by [facility] by registering at the following website: [email or web address]. By joining [employer]’s FollowMyHealth portal, you will also be able to view your health information using other applications (apps) compatible with our system.

## 2023-11-28 NOTE — ED PROVIDER NOTE - OBJECTIVE STATEMENT
89 y/o F PMH of HTN, Hypothyroidism presenting with tremors & diarrhea     Patient was seen on 11/22 and found to have UTI. She was prescribed TMP-SMX with UCx growing pansensitive E. Coli <50k CFU. She states that she believes the medication is causing her to be anxious and noted an episode of diarrhea yesterday morning that resolved. No n/v/chest pain or dyspnea. No dysuria, fevers/chills. Has two doses left of medication (took 6 day course). Denies any rash. States that she has her thyroid checked regularly.

## 2023-11-28 NOTE — ED ADULT TRIAGE NOTE - CHIEF COMPLAINT QUOTE
Patient was here 6 days ago. Diagnosed with UTI. patient is on Bactrim DS. Now c/o weakness and unable to eat.

## 2023-11-28 NOTE — ED ADULT TRIAGE NOTE - AS HEIGHT TYPE
Moe Aggarwal,    Your Dexa scan is normal. Please call the office if you have any additional questions or concerns.     Thanks,    Davide Mccauley RN
stated

## 2023-11-28 NOTE — ED PROVIDER NOTE - CLINICAL SUMMARY MEDICAL DECISION MAKING FREE TEXT BOX
89 y/o F PMH of HTN, Hypothyroidism presenting with tremors & diarrhea   Likely adverse effect of antibiotics  Check screening labs including TSH, cardiac enzymes, EKG, gentle IVF  Does not need any more abx. UCx growing <50k CFU  Outpatient PCP follow up. 89 y/o F PMH of HTN, Hypothyroidism presenting with tremors & diarrhea   Likely adverse effect of antibiotics  Check screening labs including TSH, cardiac enzymes, EKG, gentle IVF  Does not need any more abx. UCx growing <50k CFU  Outpatient PCP follow up.  Mildly hyponatremia improved with gentle IVF--->encouraged balanced electrolyte solution and increased sodium intake.

## 2023-11-28 NOTE — ED PROVIDER NOTE - NSFOLLOWUPINSTRUCTIONS_ED_ALL_ED_FT
1. Please ensure adequate sodium intake including electrolyte solutions such as pedialyte or gatorade.   2. Follow up with your primary doctor for repeat metabolic panel   3. Seek Medical attention or return to the emergency department for any new or worsening symptoms.

## 2023-11-28 NOTE — ED ADULT NURSE NOTE - NSFALLHARMRISKINTERV_ED_ALL_ED

## 2023-12-23 ENCOUNTER — EMERGENCY (EMERGENCY)
Facility: HOSPITAL | Age: 88
LOS: 1 days | Discharge: HOME | End: 2023-12-23
Attending: EMERGENCY MEDICINE | Admitting: EMERGENCY MEDICINE
Payer: MEDICARE

## 2023-12-23 VITALS
HEART RATE: 128 BPM | RESPIRATION RATE: 18 BRPM | TEMPERATURE: 100 F | SYSTOLIC BLOOD PRESSURE: 180 MMHG | WEIGHT: 110.89 LBS | HEIGHT: 59 IN | DIASTOLIC BLOOD PRESSURE: 90 MMHG | OXYGEN SATURATION: 97 %

## 2023-12-23 VITALS — DIASTOLIC BLOOD PRESSURE: 81 MMHG | SYSTOLIC BLOOD PRESSURE: 177 MMHG

## 2023-12-23 DIAGNOSIS — K76.89 OTHER SPECIFIED DISEASES OF LIVER: Chronic | ICD-10-CM

## 2023-12-23 LAB
ALBUMIN SERPL ELPH-MCNC: 3.8 G/DL — SIGNIFICANT CHANGE UP (ref 3.3–5)
ALBUMIN SERPL ELPH-MCNC: 3.8 G/DL — SIGNIFICANT CHANGE UP (ref 3.3–5)
ALP SERPL-CCNC: 88 U/L — SIGNIFICANT CHANGE UP (ref 40–120)
ALP SERPL-CCNC: 88 U/L — SIGNIFICANT CHANGE UP (ref 40–120)
ALT FLD-CCNC: 17 U/L — SIGNIFICANT CHANGE UP (ref 12–78)
ALT FLD-CCNC: 17 U/L — SIGNIFICANT CHANGE UP (ref 12–78)
ANION GAP SERPL CALC-SCNC: 7 MMOL/L — SIGNIFICANT CHANGE UP (ref 5–17)
ANION GAP SERPL CALC-SCNC: 7 MMOL/L — SIGNIFICANT CHANGE UP (ref 5–17)
APPEARANCE UR: CLEAR — SIGNIFICANT CHANGE UP
APPEARANCE UR: CLEAR — SIGNIFICANT CHANGE UP
APTT BLD: 29.2 SEC — SIGNIFICANT CHANGE UP (ref 24.5–35.6)
APTT BLD: 29.2 SEC — SIGNIFICANT CHANGE UP (ref 24.5–35.6)
AST SERPL-CCNC: 20 U/L — SIGNIFICANT CHANGE UP (ref 15–37)
AST SERPL-CCNC: 20 U/L — SIGNIFICANT CHANGE UP (ref 15–37)
BASOPHILS # BLD AUTO: 0.01 K/UL — SIGNIFICANT CHANGE UP (ref 0–0.2)
BASOPHILS # BLD AUTO: 0.01 K/UL — SIGNIFICANT CHANGE UP (ref 0–0.2)
BASOPHILS NFR BLD AUTO: 0.1 % — SIGNIFICANT CHANGE UP (ref 0–2)
BASOPHILS NFR BLD AUTO: 0.1 % — SIGNIFICANT CHANGE UP (ref 0–2)
BILIRUB SERPL-MCNC: 0.4 MG/DL — SIGNIFICANT CHANGE UP (ref 0.2–1.2)
BILIRUB SERPL-MCNC: 0.4 MG/DL — SIGNIFICANT CHANGE UP (ref 0.2–1.2)
BILIRUB UR-MCNC: NEGATIVE — SIGNIFICANT CHANGE UP
BILIRUB UR-MCNC: NEGATIVE — SIGNIFICANT CHANGE UP
BUN SERPL-MCNC: 23 MG/DL — SIGNIFICANT CHANGE UP (ref 7–23)
BUN SERPL-MCNC: 23 MG/DL — SIGNIFICANT CHANGE UP (ref 7–23)
CALCIUM SERPL-MCNC: 9.1 MG/DL — SIGNIFICANT CHANGE UP (ref 8.5–10.1)
CALCIUM SERPL-MCNC: 9.1 MG/DL — SIGNIFICANT CHANGE UP (ref 8.5–10.1)
CHLORIDE SERPL-SCNC: 100 MMOL/L — SIGNIFICANT CHANGE UP (ref 96–108)
CHLORIDE SERPL-SCNC: 100 MMOL/L — SIGNIFICANT CHANGE UP (ref 96–108)
CO2 SERPL-SCNC: 26 MMOL/L — SIGNIFICANT CHANGE UP (ref 22–31)
CO2 SERPL-SCNC: 26 MMOL/L — SIGNIFICANT CHANGE UP (ref 22–31)
COLOR SPEC: YELLOW — SIGNIFICANT CHANGE UP
COLOR SPEC: YELLOW — SIGNIFICANT CHANGE UP
CREAT SERPL-MCNC: 1 MG/DL — SIGNIFICANT CHANGE UP (ref 0.5–1.3)
CREAT SERPL-MCNC: 1 MG/DL — SIGNIFICANT CHANGE UP (ref 0.5–1.3)
DIFF PNL FLD: ABNORMAL
DIFF PNL FLD: ABNORMAL
EGFR: 53 ML/MIN/1.73M2 — LOW
EGFR: 53 ML/MIN/1.73M2 — LOW
EOSINOPHIL # BLD AUTO: 0.03 K/UL — SIGNIFICANT CHANGE UP (ref 0–0.5)
EOSINOPHIL # BLD AUTO: 0.03 K/UL — SIGNIFICANT CHANGE UP (ref 0–0.5)
EOSINOPHIL NFR BLD AUTO: 0.4 % — SIGNIFICANT CHANGE UP (ref 0–6)
EOSINOPHIL NFR BLD AUTO: 0.4 % — SIGNIFICANT CHANGE UP (ref 0–6)
GLUCOSE SERPL-MCNC: 116 MG/DL — HIGH (ref 70–99)
GLUCOSE SERPL-MCNC: 116 MG/DL — HIGH (ref 70–99)
GLUCOSE UR QL: NEGATIVE MG/DL — SIGNIFICANT CHANGE UP
GLUCOSE UR QL: NEGATIVE MG/DL — SIGNIFICANT CHANGE UP
HCT VFR BLD CALC: 39.7 % — SIGNIFICANT CHANGE UP (ref 34.5–45)
HCT VFR BLD CALC: 39.7 % — SIGNIFICANT CHANGE UP (ref 34.5–45)
HGB BLD-MCNC: 13 G/DL — SIGNIFICANT CHANGE UP (ref 11.5–15.5)
HGB BLD-MCNC: 13 G/DL — SIGNIFICANT CHANGE UP (ref 11.5–15.5)
IMM GRANULOCYTES NFR BLD AUTO: 0.4 % — SIGNIFICANT CHANGE UP (ref 0–0.9)
IMM GRANULOCYTES NFR BLD AUTO: 0.4 % — SIGNIFICANT CHANGE UP (ref 0–0.9)
INR BLD: 0.96 RATIO — SIGNIFICANT CHANGE UP (ref 0.85–1.18)
INR BLD: 0.96 RATIO — SIGNIFICANT CHANGE UP (ref 0.85–1.18)
KETONES UR-MCNC: NEGATIVE MG/DL — SIGNIFICANT CHANGE UP
KETONES UR-MCNC: NEGATIVE MG/DL — SIGNIFICANT CHANGE UP
LACTATE SERPL-SCNC: 0.9 MMOL/L — SIGNIFICANT CHANGE UP (ref 0.7–2)
LACTATE SERPL-SCNC: 0.9 MMOL/L — SIGNIFICANT CHANGE UP (ref 0.7–2)
LEUKOCYTE ESTERASE UR-ACNC: ABNORMAL
LEUKOCYTE ESTERASE UR-ACNC: ABNORMAL
LYMPHOCYTES # BLD AUTO: 0.75 K/UL — LOW (ref 1–3.3)
LYMPHOCYTES # BLD AUTO: 0.75 K/UL — LOW (ref 1–3.3)
LYMPHOCYTES # BLD AUTO: 9.3 % — LOW (ref 13–44)
LYMPHOCYTES # BLD AUTO: 9.3 % — LOW (ref 13–44)
MCHC RBC-ENTMCNC: 29.6 PG — SIGNIFICANT CHANGE UP (ref 27–34)
MCHC RBC-ENTMCNC: 29.6 PG — SIGNIFICANT CHANGE UP (ref 27–34)
MCHC RBC-ENTMCNC: 32.7 GM/DL — SIGNIFICANT CHANGE UP (ref 32–36)
MCHC RBC-ENTMCNC: 32.7 GM/DL — SIGNIFICANT CHANGE UP (ref 32–36)
MCV RBC AUTO: 90.4 FL — SIGNIFICANT CHANGE UP (ref 80–100)
MCV RBC AUTO: 90.4 FL — SIGNIFICANT CHANGE UP (ref 80–100)
MONOCYTES # BLD AUTO: 0.99 K/UL — HIGH (ref 0–0.9)
MONOCYTES # BLD AUTO: 0.99 K/UL — HIGH (ref 0–0.9)
MONOCYTES NFR BLD AUTO: 12.3 % — SIGNIFICANT CHANGE UP (ref 2–14)
MONOCYTES NFR BLD AUTO: 12.3 % — SIGNIFICANT CHANGE UP (ref 2–14)
NEUTROPHILS # BLD AUTO: 6.26 K/UL — SIGNIFICANT CHANGE UP (ref 1.8–7.4)
NEUTROPHILS # BLD AUTO: 6.26 K/UL — SIGNIFICANT CHANGE UP (ref 1.8–7.4)
NEUTROPHILS NFR BLD AUTO: 77.5 % — HIGH (ref 43–77)
NEUTROPHILS NFR BLD AUTO: 77.5 % — HIGH (ref 43–77)
NITRITE UR-MCNC: NEGATIVE — SIGNIFICANT CHANGE UP
NITRITE UR-MCNC: NEGATIVE — SIGNIFICANT CHANGE UP
NRBC # BLD: 0 /100 WBCS — SIGNIFICANT CHANGE UP (ref 0–0)
NRBC # BLD: 0 /100 WBCS — SIGNIFICANT CHANGE UP (ref 0–0)
PH UR: 6.5 — SIGNIFICANT CHANGE UP (ref 5–8)
PH UR: 6.5 — SIGNIFICANT CHANGE UP (ref 5–8)
PLATELET # BLD AUTO: 234 K/UL — SIGNIFICANT CHANGE UP (ref 150–400)
PLATELET # BLD AUTO: 234 K/UL — SIGNIFICANT CHANGE UP (ref 150–400)
POTASSIUM SERPL-MCNC: 3.9 MMOL/L — SIGNIFICANT CHANGE UP (ref 3.5–5.3)
POTASSIUM SERPL-MCNC: 3.9 MMOL/L — SIGNIFICANT CHANGE UP (ref 3.5–5.3)
POTASSIUM SERPL-SCNC: 3.9 MMOL/L — SIGNIFICANT CHANGE UP (ref 3.5–5.3)
POTASSIUM SERPL-SCNC: 3.9 MMOL/L — SIGNIFICANT CHANGE UP (ref 3.5–5.3)
PROT SERPL-MCNC: 7.5 G/DL — SIGNIFICANT CHANGE UP (ref 6–8.3)
PROT SERPL-MCNC: 7.5 G/DL — SIGNIFICANT CHANGE UP (ref 6–8.3)
PROT UR-MCNC: SIGNIFICANT CHANGE UP MG/DL
PROT UR-MCNC: SIGNIFICANT CHANGE UP MG/DL
PROTHROM AB SERPL-ACNC: 11.2 SEC — SIGNIFICANT CHANGE UP (ref 9.5–13)
PROTHROM AB SERPL-ACNC: 11.2 SEC — SIGNIFICANT CHANGE UP (ref 9.5–13)
RAPID RVP RESULT: SIGNIFICANT CHANGE UP
RAPID RVP RESULT: SIGNIFICANT CHANGE UP
RBC # BLD: 4.39 M/UL — SIGNIFICANT CHANGE UP (ref 3.8–5.2)
RBC # BLD: 4.39 M/UL — SIGNIFICANT CHANGE UP (ref 3.8–5.2)
RBC # FLD: 14.4 % — SIGNIFICANT CHANGE UP (ref 10.3–14.5)
RBC # FLD: 14.4 % — SIGNIFICANT CHANGE UP (ref 10.3–14.5)
SARS-COV-2 RNA SPEC QL NAA+PROBE: SIGNIFICANT CHANGE UP
SARS-COV-2 RNA SPEC QL NAA+PROBE: SIGNIFICANT CHANGE UP
SODIUM SERPL-SCNC: 133 MMOL/L — LOW (ref 135–145)
SODIUM SERPL-SCNC: 133 MMOL/L — LOW (ref 135–145)
SP GR SPEC: 1.01 — SIGNIFICANT CHANGE UP (ref 1–1.03)
SP GR SPEC: 1.01 — SIGNIFICANT CHANGE UP (ref 1–1.03)
UROBILINOGEN FLD QL: 0.2 MG/DL — SIGNIFICANT CHANGE UP (ref 0.2–1)
UROBILINOGEN FLD QL: 0.2 MG/DL — SIGNIFICANT CHANGE UP (ref 0.2–1)
WBC # BLD: 8.07 K/UL — SIGNIFICANT CHANGE UP (ref 3.8–10.5)
WBC # BLD: 8.07 K/UL — SIGNIFICANT CHANGE UP (ref 3.8–10.5)
WBC # FLD AUTO: 8.07 K/UL — SIGNIFICANT CHANGE UP (ref 3.8–10.5)
WBC # FLD AUTO: 8.07 K/UL — SIGNIFICANT CHANGE UP (ref 3.8–10.5)

## 2023-12-23 PROCEDURE — 85610 PROTHROMBIN TIME: CPT

## 2023-12-23 PROCEDURE — 0225U NFCT DS DNA&RNA 21 SARSCOV2: CPT

## 2023-12-23 PROCEDURE — 81001 URINALYSIS AUTO W/SCOPE: CPT

## 2023-12-23 PROCEDURE — 87086 URINE CULTURE/COLONY COUNT: CPT

## 2023-12-23 PROCEDURE — 99285 EMERGENCY DEPT VISIT HI MDM: CPT | Mod: 25

## 2023-12-23 PROCEDURE — 96374 THER/PROPH/DIAG INJ IV PUSH: CPT

## 2023-12-23 PROCEDURE — 71045 X-RAY EXAM CHEST 1 VIEW: CPT

## 2023-12-23 PROCEDURE — 85025 COMPLETE CBC W/AUTO DIFF WBC: CPT

## 2023-12-23 PROCEDURE — 36415 COLL VENOUS BLD VENIPUNCTURE: CPT

## 2023-12-23 PROCEDURE — 99285 EMERGENCY DEPT VISIT HI MDM: CPT | Mod: FS

## 2023-12-23 PROCEDURE — 93005 ELECTROCARDIOGRAM TRACING: CPT

## 2023-12-23 PROCEDURE — 71045 X-RAY EXAM CHEST 1 VIEW: CPT | Mod: 26

## 2023-12-23 PROCEDURE — 87040 BLOOD CULTURE FOR BACTERIA: CPT

## 2023-12-23 PROCEDURE — 85730 THROMBOPLASTIN TIME PARTIAL: CPT

## 2023-12-23 PROCEDURE — 80053 COMPREHEN METABOLIC PANEL: CPT

## 2023-12-23 PROCEDURE — 93010 ELECTROCARDIOGRAM REPORT: CPT

## 2023-12-23 PROCEDURE — 83605 ASSAY OF LACTIC ACID: CPT

## 2023-12-23 RX ORDER — SODIUM CHLORIDE 9 MG/ML
1000 INJECTION INTRAMUSCULAR; INTRAVENOUS; SUBCUTANEOUS ONCE
Refills: 0 | Status: COMPLETED | OUTPATIENT
Start: 2023-12-23 | End: 2023-12-23

## 2023-12-23 RX ORDER — CEFUROXIME AXETIL 250 MG
1 TABLET ORAL
Qty: 14 | Refills: 0
Start: 2023-12-23 | End: 2023-12-29

## 2023-12-23 RX ORDER — CEFTRIAXONE 500 MG/1
1000 INJECTION, POWDER, FOR SOLUTION INTRAMUSCULAR; INTRAVENOUS ONCE
Refills: 0 | Status: COMPLETED | OUTPATIENT
Start: 2023-12-23 | End: 2023-12-23

## 2023-12-23 RX ORDER — METOPROLOL TARTRATE 50 MG
50 TABLET ORAL ONCE
Refills: 0 | Status: COMPLETED | OUTPATIENT
Start: 2023-12-23 | End: 2023-12-23

## 2023-12-23 RX ADMIN — CEFTRIAXONE 100 MILLIGRAM(S): 500 INJECTION, POWDER, FOR SOLUTION INTRAMUSCULAR; INTRAVENOUS at 19:08

## 2023-12-23 RX ADMIN — Medication 50 MILLIGRAM(S): at 21:00

## 2023-12-23 RX ADMIN — Medication 0.1 MILLIGRAM(S): at 21:01

## 2023-12-23 RX ADMIN — SODIUM CHLORIDE 1000 MILLILITER(S): 9 INJECTION INTRAMUSCULAR; INTRAVENOUS; SUBCUTANEOUS at 19:09

## 2023-12-23 NOTE — ED ADULT NURSE NOTE - NSFALLHARMRISKINTERV_ED_ALL_ED
Assistance OOB with selected safe patient handling equipment if applicable/Assistance with ambulation/Communicate risk of Fall with Harm to all staff, patient, and family/Encourage patient to sit up slowly, dangle for a short time, stand at bedside before walking/Monitor gait and stability/Orthostatic vital signs/Provide patient with walking aids/Provide visual cue: red socks, yellow wristband, yellow gown, etc/Reinforce activity limits and safety measures with patient and family/Bed in lowest position, wheels locked, appropriate side rails in place/Call bell, personal items and telephone in reach/Instruct patient to call for assistance before getting out of bed/chair/stretcher/Non-slip footwear applied when patient is off stretcher/[place] to call system/Physically safe environment - no spills, clutter or unnecessary equipment/Purposeful Proactive Rounding/Room/bathroom lighting operational, light cord in reach Assistance OOB with selected safe patient handling equipment if applicable/Assistance with ambulation/Communicate risk of Fall with Harm to all staff, patient, and family/Encourage patient to sit up slowly, dangle for a short time, stand at bedside before walking/Monitor gait and stability/Orthostatic vital signs/Provide patient with walking aids/Provide visual cue: red socks, yellow wristband, yellow gown, etc/Reinforce activity limits and safety measures with patient and family/Bed in lowest position, wheels locked, appropriate side rails in place/Call bell, personal items and telephone in reach/Instruct patient to call for assistance before getting out of bed/chair/stretcher/Non-slip footwear applied when patient is off stretcher/[place] to call system/Physically safe environment - no spills, clutter or unnecessary equipment/Purposeful Proactive Rounding/Room/bathroom lighting operational, light cord in reach

## 2023-12-23 NOTE — ED PROVIDER NOTE - DIFFERENTIAL DIAGNOSIS
Differential Diagnosis Differentials include but not limited to urinary tract infection, dehydration, electrolyte abnormality, sepsis, influenza, RSV, viral illness

## 2023-12-23 NOTE — ED PROVIDER NOTE - CARE PROVIDER_API CALL
Eliseo Jarquin  Urology  [address], [address]  [place] [address]  Phone: [phone]  Fax: [phone]  Follow Up Time: 1-3 Days   Eliseo Jarquin  Urology  [address], [address]  [place] [address]  Phone: [phone]  Fax: [phone]  Follow Up Time: 1-3 Days

## 2023-12-23 NOTE — ED PROVIDER NOTE - PATIENT PORTAL LINK FT
You can access the FollowMyHealth Patient Portal offered by [facility] by registering at the following website: [email or web address]. By joining [employer]’s FollowMyHealth portal, you will also be able to view your health information using other applications (apps) compatible with our system. You can access the FollowMyHealth Patient Portal offered by Health system by registering at the following website: [email or web address]. By joining [employer]’s FollowMyHealth portal, you will also be able to view your health information using other applications (apps) compatible with our system.

## 2023-12-23 NOTE — ED ADULT NURSE NOTE - ED CARDIAC CAPILLARY REFILL
2 seconds or less
Quality 130: Documentation Of Current Medications In The Medical Record: Current Medications Documented
Quality 110: Preventive Care And Screening: Influenza Immunization: Influenza Immunization previously received during influenza season
Detail Level: Detailed

## 2023-12-23 NOTE — ED PROVIDER NOTE - NSFOLLOWUPINSTRUCTIONS_ED_ALL_ED_FT
Drink plenty of fluids  Ceftin twice a day for 7 days  Follow-up with urologist in 4 days as scheduled        Urinary Tract Infection, Adult    A urinary tract infection (UTI) is an infection of any part of the urinary tract. The urinary tract includes the kidneys, ureters, bladder, and urethra. These organs make, store, and get rid of urine in the body.    An upper UTI affects the ureters and kidneys. A lower UTI affects the bladder and urethra.    What are the causes?  Most urinary tract infections are caused by bacteria in your genital area around your urethra, where urine leaves your body. These bacteria grow and cause inflammation of your urinary tract.    What increases the risk?  You are more likely to develop this condition if:  You have a urinary catheter that stays in place.  You are not able to control when you urinate or have a bowel movement (incontinence).  You are female and you:  Use a spermicide or diaphragm for birth control.  Have low estrogen levels.  Are pregnant.  You have certain genes that increase your risk.  You are sexually active.  You take antibiotic medicines.  You have a condition that causes your flow of urine to slow down, such as:  An enlarged prostate, if you are male.  Blockage in your urethra.  A kidney stone.  A nerve condition that affects your bladder control (neurogenic bladder).  Not getting enough to drink, or not urinating often.  You have certain medical conditions, such as:  Diabetes.  A weak disease-fighting system (immunesystem).  Sickle cell disease.  Gout.  Spinal cord injury.  What are the signs or symptoms?  Symptoms of this condition include:  Needing to urinate right away (urgency).  Frequent urination. This may include small amounts of urine each time you urinate.  Pain or burning with urination.  Blood in the urine.  Urine that smells bad or unusual.  Trouble urinating.  Cloudy urine.  Vaginal discharge, if you are female.  Pain in the abdomen or the lower back.  You may also have:  Vomiting or a decreased appetite.  Confusion.  Irritability or tiredness.  A fever or chills.  Diarrhea.  The first symptom in older adults may be confusion. In some cases, they may not have any symptoms until the infection has worsened.    How is this diagnosed?  This condition is diagnosed based on your medical history and a physical exam. You may also have other tests, including:  Urine tests.  Blood tests.  Tests for STIs (sexually transmitted infections).  If you have had more than one UTI, a cystoscopy or imaging studies may be done to determine the cause of the infections.    How is this treated?  Treatment for this condition includes:  Antibiotic medicine.  Over-the-counter medicines to treat discomfort.  Drinking enough water to stay hydrated.  If you have frequent infections or have other conditions such as a kidney stone, you may need to see a health care provider who specializes in the urinary tract (urologist).    In rare cases, urinary tract infections can cause sepsis. Sepsis is a life-threatening condition that occurs when the body responds to an infection. Sepsis is treated in the hospital with IV antibiotics, fluids, and other medicines.    Follow these instructions at home:    Medicines    Take over-the-counter and prescription medicines only as told by your health care provider.  If you were prescribed an antibiotic medicine, take it as told by your health care provider. Do not stop using the antibiotic even if you start to feel better.  General instructions    Make sure you:  Empty your bladder often and completely. Do not hold urine for long periods of time.  Empty your bladder after sex.  Wipe from front to back after urinating or having a bowel movement if you are female. Use each tissue only one time when you wipe.  Drink enough fluid to keep your urine pale yellow.  Keep all follow-up visits. This is important.  Contact a health care provider if:  Your symptoms do not get better after 1–2 days.  Your symptoms go away and then return.  Get help right away if:  You have severe pain in your back or your lower abdomen.  You have a fever or chills.  You have nausea or vomiting.  Summary  A urinary tract infection (UTI) is an infection of any part of the urinary tract, which includes the kidneys, ureters, bladder, and urethra.  Most urinary tract infections are caused by bacteria in your genital area.  Treatment for this condition often includes antibiotic medicines.  If you were prescribed an antibiotic medicine, take it as told by your health care provider. Do not stop using the antibiotic even if you start to feel better.  Keep all follow-up visits. This is important.  This information is not intended to replace advice given to you by your health care provider. Make sure you discuss any questions you have with your health care provider.

## 2023-12-23 NOTE — ED ADULT TRIAGE NOTE - ARRIVAL FROM
Pt is requesting a refill of metFORMIN HCl  MG Oral Tablet 24 Hr    Pt states she has 6-10 days of this med left. Home

## 2023-12-23 NOTE — ED PROVIDER NOTE - CARE PROVIDERS DIRECT ADDRESSES
[email or web address]

## 2023-12-23 NOTE — ED PROVIDER NOTE - CLINICAL SUMMARY MEDICAL DECISION MAKING FREE TEXT BOX
89-year-old female with a history of hypertension, hyperlipidemia, atrial tachycardia presents with having some dysuria, urinary frequency since earlier today.  Patient with some slight general malaise.  No fever.  Patient has some slight chills only when urinating.  No abdominal pain.  No vomiting or diarrhea.  Normal p.o. intake.  No cough/URI.  No chest pain or shortness of breath.  No aggravating or alleviating factors otherwise noted.  No other acute injury or complaints.  Exam: Nontoxic, well-appearing.  Normal respiratory effort.  CTA BL, no W/R/R.  Normal cardiac exam.  Abdomen soft, nontender, nondistended.  No HSM.  No CVAT.  Normal nonfocal neurologic exam.  No C/C/E.  No other acute findings on exam.  Acute urinary symptoms with out signs of significant sepsis, patient feeling well.  At length discussion with patient and daughter regarding the nature of her symptoms.  The patient would like to go home with oral antibiotics after workup.  Will check labs, urine, IV antibiotic dose, reeval

## 2023-12-23 NOTE — ED PROVIDER NOTE - PROGRESS NOTE DETAILS
Patient stable.  Overall much improved.  Had elevated blood pressure but did not take evening blood pressure medication.  Clonidine and metoprolol given emergency room.  Urinalysis shows infection.  No abdominal pain or flank pain.  Do not suspect pyelonephritis or kidney stone.  Has follow-up with urology scheduled in 4 days.  Rocephin infused emergency room.  Will start on Ceftin.  Overall appears well.  Afebrile.  No elevated white count.  Denies any dizziness or weakness. copies of all labs provided to patient /81. no HA, dizziness, confusion, chest pain. will continue blood pressure meds at home and have c close PCP follow up

## 2023-12-23 NOTE — ED PROVIDER NOTE - OBJECTIVE STATEMENT
89-year-old female with history of atrial tachycardia, hypertension, and hyperlipidemia presents to emergency room for urinary frequency and urgency that started this morning.  Patient reports some chills when she is urinating.  Denies any generalized weakness.  Denies any abdominal pain, flank pain, nausea, vomiting, diarrhea.  No known fevers.  Patient states she was diagnosed with UTI on November 22.  Was prescribed Bactrim.  Was again seen in November 28 and diagnosed with hyponatremia.  Improved with IV fluids.  Has appointment scheduled with urologist (Dr. Jarquin)  in 4 days that was scheduled after last visit.  PCP Rebecca Stokes

## 2023-12-25 LAB
CULTURE RESULTS: SIGNIFICANT CHANGE UP
CULTURE RESULTS: SIGNIFICANT CHANGE UP
SPECIMEN SOURCE: SIGNIFICANT CHANGE UP
SPECIMEN SOURCE: SIGNIFICANT CHANGE UP

## 2023-12-29 LAB
CULTURE RESULTS: SIGNIFICANT CHANGE UP
SPECIMEN SOURCE: SIGNIFICANT CHANGE UP

## 2024-01-06 ENCOUNTER — INPATIENT (INPATIENT)
Facility: HOSPITAL | Age: 89
LOS: 6 days | Discharge: HOME | DRG: 309 | End: 2024-01-13
Attending: GENERAL PRACTICE
Payer: MEDICARE

## 2024-01-06 VITALS
TEMPERATURE: 98 F | HEIGHT: 59 IN | RESPIRATION RATE: 18 BRPM | OXYGEN SATURATION: 98 % | DIASTOLIC BLOOD PRESSURE: 81 MMHG | WEIGHT: 110.01 LBS | SYSTOLIC BLOOD PRESSURE: 186 MMHG | HEART RATE: 124 BPM

## 2024-01-06 DIAGNOSIS — I10 ESSENTIAL (PRIMARY) HYPERTENSION: ICD-10-CM

## 2024-01-06 DIAGNOSIS — E03.9 HYPOTHYROIDISM, UNSPECIFIED: ICD-10-CM

## 2024-01-06 DIAGNOSIS — R00.2 PALPITATIONS: ICD-10-CM

## 2024-01-06 DIAGNOSIS — E78.5 HYPERLIPIDEMIA, UNSPECIFIED: ICD-10-CM

## 2024-01-06 DIAGNOSIS — Z29.9 ENCOUNTER FOR PROPHYLACTIC MEASURES, UNSPECIFIED: ICD-10-CM

## 2024-01-06 DIAGNOSIS — E87.1 HYPO-OSMOLALITY AND HYPONATREMIA: ICD-10-CM

## 2024-01-06 DIAGNOSIS — K76.89 OTHER SPECIFIED DISEASES OF LIVER: Chronic | ICD-10-CM

## 2024-01-06 LAB
ALBUMIN SERPL ELPH-MCNC: 3.7 G/DL — SIGNIFICANT CHANGE UP (ref 3.3–5)
ALBUMIN SERPL ELPH-MCNC: 3.7 G/DL — SIGNIFICANT CHANGE UP (ref 3.3–5)
ALP SERPL-CCNC: 94 U/L — SIGNIFICANT CHANGE UP (ref 40–120)
ALP SERPL-CCNC: 94 U/L — SIGNIFICANT CHANGE UP (ref 40–120)
ALT FLD-CCNC: 18 U/L — SIGNIFICANT CHANGE UP (ref 12–78)
ALT FLD-CCNC: 18 U/L — SIGNIFICANT CHANGE UP (ref 12–78)
ANION GAP SERPL CALC-SCNC: 4 MMOL/L — LOW (ref 5–17)
ANION GAP SERPL CALC-SCNC: 4 MMOL/L — LOW (ref 5–17)
APPEARANCE UR: CLEAR — SIGNIFICANT CHANGE UP
APPEARANCE UR: CLEAR — SIGNIFICANT CHANGE UP
APTT BLD: 27 SEC — SIGNIFICANT CHANGE UP (ref 24.5–35.6)
APTT BLD: 27 SEC — SIGNIFICANT CHANGE UP (ref 24.5–35.6)
AST SERPL-CCNC: 20 U/L — SIGNIFICANT CHANGE UP (ref 15–37)
AST SERPL-CCNC: 20 U/L — SIGNIFICANT CHANGE UP (ref 15–37)
BASOPHILS # BLD AUTO: 0.03 K/UL — SIGNIFICANT CHANGE UP (ref 0–0.2)
BASOPHILS # BLD AUTO: 0.03 K/UL — SIGNIFICANT CHANGE UP (ref 0–0.2)
BASOPHILS NFR BLD AUTO: 0.4 % — SIGNIFICANT CHANGE UP (ref 0–2)
BASOPHILS NFR BLD AUTO: 0.4 % — SIGNIFICANT CHANGE UP (ref 0–2)
BILIRUB SERPL-MCNC: 0.3 MG/DL — SIGNIFICANT CHANGE UP (ref 0.2–1.2)
BILIRUB SERPL-MCNC: 0.3 MG/DL — SIGNIFICANT CHANGE UP (ref 0.2–1.2)
BILIRUB UR-MCNC: NEGATIVE — SIGNIFICANT CHANGE UP
BILIRUB UR-MCNC: NEGATIVE — SIGNIFICANT CHANGE UP
BUN SERPL-MCNC: 26 MG/DL — HIGH (ref 7–23)
BUN SERPL-MCNC: 26 MG/DL — HIGH (ref 7–23)
CALCIUM SERPL-MCNC: 9 MG/DL — SIGNIFICANT CHANGE UP (ref 8.5–10.1)
CALCIUM SERPL-MCNC: 9 MG/DL — SIGNIFICANT CHANGE UP (ref 8.5–10.1)
CHLORIDE SERPL-SCNC: 102 MMOL/L — SIGNIFICANT CHANGE UP (ref 96–108)
CHLORIDE SERPL-SCNC: 102 MMOL/L — SIGNIFICANT CHANGE UP (ref 96–108)
CK MB BLD-MCNC: 2.1 % — SIGNIFICANT CHANGE UP (ref 0–3.5)
CK MB BLD-MCNC: 2.1 % — SIGNIFICANT CHANGE UP (ref 0–3.5)
CK MB CFR SERPL CALC: 1.9 NG/ML — SIGNIFICANT CHANGE UP (ref 0–3.6)
CK MB CFR SERPL CALC: 1.9 NG/ML — SIGNIFICANT CHANGE UP (ref 0–3.6)
CK SERPL-CCNC: 91 U/L — SIGNIFICANT CHANGE UP (ref 26–192)
CK SERPL-CCNC: 91 U/L — SIGNIFICANT CHANGE UP (ref 26–192)
CO2 SERPL-SCNC: 27 MMOL/L — SIGNIFICANT CHANGE UP (ref 22–31)
CO2 SERPL-SCNC: 27 MMOL/L — SIGNIFICANT CHANGE UP (ref 22–31)
COLOR SPEC: YELLOW — SIGNIFICANT CHANGE UP
COLOR SPEC: YELLOW — SIGNIFICANT CHANGE UP
CREAT SERPL-MCNC: 0.92 MG/DL — SIGNIFICANT CHANGE UP (ref 0.5–1.3)
CREAT SERPL-MCNC: 0.92 MG/DL — SIGNIFICANT CHANGE UP (ref 0.5–1.3)
DIFF PNL FLD: NEGATIVE — SIGNIFICANT CHANGE UP
DIFF PNL FLD: NEGATIVE — SIGNIFICANT CHANGE UP
EGFR: 58 ML/MIN/1.73M2 — LOW
EGFR: 58 ML/MIN/1.73M2 — LOW
EOSINOPHIL # BLD AUTO: 0.06 K/UL — SIGNIFICANT CHANGE UP (ref 0–0.5)
EOSINOPHIL # BLD AUTO: 0.06 K/UL — SIGNIFICANT CHANGE UP (ref 0–0.5)
EOSINOPHIL NFR BLD AUTO: 0.8 % — SIGNIFICANT CHANGE UP (ref 0–6)
EOSINOPHIL NFR BLD AUTO: 0.8 % — SIGNIFICANT CHANGE UP (ref 0–6)
FLUAV AG NPH QL: SIGNIFICANT CHANGE UP
FLUAV AG NPH QL: SIGNIFICANT CHANGE UP
FLUBV AG NPH QL: SIGNIFICANT CHANGE UP
FLUBV AG NPH QL: SIGNIFICANT CHANGE UP
GLUCOSE SERPL-MCNC: 119 MG/DL — HIGH (ref 70–99)
GLUCOSE SERPL-MCNC: 119 MG/DL — HIGH (ref 70–99)
GLUCOSE UR QL: NEGATIVE MG/DL — SIGNIFICANT CHANGE UP
GLUCOSE UR QL: NEGATIVE MG/DL — SIGNIFICANT CHANGE UP
HCT VFR BLD CALC: 39.6 % — SIGNIFICANT CHANGE UP (ref 34.5–45)
HCT VFR BLD CALC: 39.6 % — SIGNIFICANT CHANGE UP (ref 34.5–45)
HGB BLD-MCNC: 13.1 G/DL — SIGNIFICANT CHANGE UP (ref 11.5–15.5)
HGB BLD-MCNC: 13.1 G/DL — SIGNIFICANT CHANGE UP (ref 11.5–15.5)
IMM GRANULOCYTES NFR BLD AUTO: 0.7 % — SIGNIFICANT CHANGE UP (ref 0–0.9)
IMM GRANULOCYTES NFR BLD AUTO: 0.7 % — SIGNIFICANT CHANGE UP (ref 0–0.9)
INR BLD: 0.92 RATIO — SIGNIFICANT CHANGE UP (ref 0.85–1.18)
INR BLD: 0.92 RATIO — SIGNIFICANT CHANGE UP (ref 0.85–1.18)
KETONES UR-MCNC: NEGATIVE MG/DL — SIGNIFICANT CHANGE UP
KETONES UR-MCNC: NEGATIVE MG/DL — SIGNIFICANT CHANGE UP
LACTATE SERPL-SCNC: 0.9 MMOL/L — SIGNIFICANT CHANGE UP (ref 0.7–2)
LACTATE SERPL-SCNC: 0.9 MMOL/L — SIGNIFICANT CHANGE UP (ref 0.7–2)
LEUKOCYTE ESTERASE UR-ACNC: ABNORMAL
LEUKOCYTE ESTERASE UR-ACNC: ABNORMAL
LIDOCAIN IGE QN: 41 U/L — SIGNIFICANT CHANGE UP (ref 13–75)
LIDOCAIN IGE QN: 41 U/L — SIGNIFICANT CHANGE UP (ref 13–75)
LYMPHOCYTES # BLD AUTO: 0.85 K/UL — LOW (ref 1–3.3)
LYMPHOCYTES # BLD AUTO: 0.85 K/UL — LOW (ref 1–3.3)
LYMPHOCYTES # BLD AUTO: 11.4 % — LOW (ref 13–44)
LYMPHOCYTES # BLD AUTO: 11.4 % — LOW (ref 13–44)
MAGNESIUM SERPL-MCNC: 2.2 MG/DL — SIGNIFICANT CHANGE UP (ref 1.6–2.6)
MAGNESIUM SERPL-MCNC: 2.2 MG/DL — SIGNIFICANT CHANGE UP (ref 1.6–2.6)
MCHC RBC-ENTMCNC: 29.6 PG — SIGNIFICANT CHANGE UP (ref 27–34)
MCHC RBC-ENTMCNC: 29.6 PG — SIGNIFICANT CHANGE UP (ref 27–34)
MCHC RBC-ENTMCNC: 33.1 GM/DL — SIGNIFICANT CHANGE UP (ref 32–36)
MCHC RBC-ENTMCNC: 33.1 GM/DL — SIGNIFICANT CHANGE UP (ref 32–36)
MCV RBC AUTO: 89.6 FL — SIGNIFICANT CHANGE UP (ref 80–100)
MCV RBC AUTO: 89.6 FL — SIGNIFICANT CHANGE UP (ref 80–100)
MONOCYTES # BLD AUTO: 1.27 K/UL — HIGH (ref 0–0.9)
MONOCYTES # BLD AUTO: 1.27 K/UL — HIGH (ref 0–0.9)
MONOCYTES NFR BLD AUTO: 17.1 % — HIGH (ref 2–14)
MONOCYTES NFR BLD AUTO: 17.1 % — HIGH (ref 2–14)
NEUTROPHILS # BLD AUTO: 5.17 K/UL — SIGNIFICANT CHANGE UP (ref 1.8–7.4)
NEUTROPHILS # BLD AUTO: 5.17 K/UL — SIGNIFICANT CHANGE UP (ref 1.8–7.4)
NEUTROPHILS NFR BLD AUTO: 69.6 % — SIGNIFICANT CHANGE UP (ref 43–77)
NEUTROPHILS NFR BLD AUTO: 69.6 % — SIGNIFICANT CHANGE UP (ref 43–77)
NITRITE UR-MCNC: NEGATIVE — SIGNIFICANT CHANGE UP
NITRITE UR-MCNC: NEGATIVE — SIGNIFICANT CHANGE UP
NRBC # BLD: 0 /100 WBCS — SIGNIFICANT CHANGE UP (ref 0–0)
NRBC # BLD: 0 /100 WBCS — SIGNIFICANT CHANGE UP (ref 0–0)
NT-PROBNP SERPL-SCNC: 1086 PG/ML — HIGH (ref 0–450)
NT-PROBNP SERPL-SCNC: 1086 PG/ML — HIGH (ref 0–450)
PH UR: 7 — SIGNIFICANT CHANGE UP (ref 5–8)
PH UR: 7 — SIGNIFICANT CHANGE UP (ref 5–8)
PLATELET # BLD AUTO: 251 K/UL — SIGNIFICANT CHANGE UP (ref 150–400)
PLATELET # BLD AUTO: 251 K/UL — SIGNIFICANT CHANGE UP (ref 150–400)
POTASSIUM SERPL-MCNC: 4.1 MMOL/L — SIGNIFICANT CHANGE UP (ref 3.5–5.3)
POTASSIUM SERPL-MCNC: 4.1 MMOL/L — SIGNIFICANT CHANGE UP (ref 3.5–5.3)
POTASSIUM SERPL-SCNC: 4.1 MMOL/L — SIGNIFICANT CHANGE UP (ref 3.5–5.3)
POTASSIUM SERPL-SCNC: 4.1 MMOL/L — SIGNIFICANT CHANGE UP (ref 3.5–5.3)
PROT SERPL-MCNC: 7.8 G/DL — SIGNIFICANT CHANGE UP (ref 6–8.3)
PROT SERPL-MCNC: 7.8 G/DL — SIGNIFICANT CHANGE UP (ref 6–8.3)
PROT UR-MCNC: 30 MG/DL
PROT UR-MCNC: 30 MG/DL
PROTHROM AB SERPL-ACNC: 10.8 SEC — SIGNIFICANT CHANGE UP (ref 9.5–13)
PROTHROM AB SERPL-ACNC: 10.8 SEC — SIGNIFICANT CHANGE UP (ref 9.5–13)
RBC # BLD: 4.42 M/UL — SIGNIFICANT CHANGE UP (ref 3.8–5.2)
RBC # BLD: 4.42 M/UL — SIGNIFICANT CHANGE UP (ref 3.8–5.2)
RBC # FLD: 14.1 % — SIGNIFICANT CHANGE UP (ref 10.3–14.5)
RBC # FLD: 14.1 % — SIGNIFICANT CHANGE UP (ref 10.3–14.5)
RSV RNA NPH QL NAA+NON-PROBE: SIGNIFICANT CHANGE UP
RSV RNA NPH QL NAA+NON-PROBE: SIGNIFICANT CHANGE UP
SARS-COV-2 RNA SPEC QL NAA+PROBE: SIGNIFICANT CHANGE UP
SARS-COV-2 RNA SPEC QL NAA+PROBE: SIGNIFICANT CHANGE UP
SODIUM SERPL-SCNC: 133 MMOL/L — LOW (ref 135–145)
SODIUM SERPL-SCNC: 133 MMOL/L — LOW (ref 135–145)
SP GR SPEC: 1.01 — SIGNIFICANT CHANGE UP (ref 1–1.03)
SP GR SPEC: 1.01 — SIGNIFICANT CHANGE UP (ref 1–1.03)
TROPONIN I, HIGH SENSITIVITY RESULT: 18.9 NG/L — SIGNIFICANT CHANGE UP
TROPONIN I, HIGH SENSITIVITY RESULT: 18.9 NG/L — SIGNIFICANT CHANGE UP
TROPONIN I, HIGH SENSITIVITY RESULT: 9.4 NG/L — SIGNIFICANT CHANGE UP
TROPONIN I, HIGH SENSITIVITY RESULT: 9.4 NG/L — SIGNIFICANT CHANGE UP
TSH SERPL-MCNC: 1.77 UIU/ML — SIGNIFICANT CHANGE UP (ref 0.36–3.74)
TSH SERPL-MCNC: 1.77 UIU/ML — SIGNIFICANT CHANGE UP (ref 0.36–3.74)
UROBILINOGEN FLD QL: 0.2 MG/DL — SIGNIFICANT CHANGE UP (ref 0.2–1)
UROBILINOGEN FLD QL: 0.2 MG/DL — SIGNIFICANT CHANGE UP (ref 0.2–1)
WBC # BLD: 7.43 K/UL — SIGNIFICANT CHANGE UP (ref 3.8–10.5)
WBC # BLD: 7.43 K/UL — SIGNIFICANT CHANGE UP (ref 3.8–10.5)
WBC # FLD AUTO: 7.43 K/UL — SIGNIFICANT CHANGE UP (ref 3.8–10.5)
WBC # FLD AUTO: 7.43 K/UL — SIGNIFICANT CHANGE UP (ref 3.8–10.5)

## 2024-01-06 PROCEDURE — 71275 CT ANGIOGRAPHY CHEST: CPT | Mod: 26,QQ

## 2024-01-06 PROCEDURE — 93010 ELECTROCARDIOGRAM REPORT: CPT | Mod: 76

## 2024-01-06 PROCEDURE — 99285 EMERGENCY DEPT VISIT HI MDM: CPT

## 2024-01-06 PROCEDURE — 71045 X-RAY EXAM CHEST 1 VIEW: CPT | Mod: 26

## 2024-01-06 PROCEDURE — 99233 SBSQ HOSP IP/OBS HIGH 50: CPT

## 2024-01-06 RX ORDER — METOPROLOL TARTRATE 50 MG
50 TABLET ORAL
Refills: 0 | Status: DISCONTINUED | OUTPATIENT
Start: 2024-01-06 | End: 2024-01-07

## 2024-01-06 RX ORDER — SODIUM CHLORIDE 9 MG/ML
500 INJECTION INTRAMUSCULAR; INTRAVENOUS; SUBCUTANEOUS ONCE
Refills: 0 | Status: COMPLETED | OUTPATIENT
Start: 2024-01-06 | End: 2024-01-06

## 2024-01-06 RX ORDER — SIMVASTATIN 20 MG/1
10 TABLET, FILM COATED ORAL AT BEDTIME
Refills: 0 | Status: DISCONTINUED | OUTPATIENT
Start: 2024-01-06 | End: 2024-01-13

## 2024-01-06 RX ORDER — ACETAMINOPHEN 500 MG
650 TABLET ORAL EVERY 6 HOURS
Refills: 0 | Status: DISCONTINUED | OUTPATIENT
Start: 2024-01-06 | End: 2024-01-13

## 2024-01-06 RX ORDER — ENOXAPARIN SODIUM 100 MG/ML
30 INJECTION SUBCUTANEOUS EVERY 24 HOURS
Refills: 0 | Status: DISCONTINUED | OUTPATIENT
Start: 2024-01-06 | End: 2024-01-13

## 2024-01-06 RX ORDER — SIMVASTATIN 20 MG/1
1 TABLET, FILM COATED ORAL
Qty: 0 | Refills: 0 | DISCHARGE

## 2024-01-06 RX ORDER — ENOXAPARIN SODIUM 100 MG/ML
40 INJECTION SUBCUTANEOUS EVERY 24 HOURS
Refills: 0 | Status: DISCONTINUED | OUTPATIENT
Start: 2024-01-06 | End: 2024-01-06

## 2024-01-06 RX ORDER — MULTIVIT-MIN/FERROUS GLUCONATE 9 MG/15 ML
1 LIQUID (ML) ORAL DAILY
Refills: 0 | Status: DISCONTINUED | OUTPATIENT
Start: 2024-01-06 | End: 2024-01-13

## 2024-01-06 RX ORDER — LEVOTHYROXINE SODIUM 125 MCG
25 TABLET ORAL DAILY
Refills: 0 | Status: DISCONTINUED | OUTPATIENT
Start: 2024-01-06 | End: 2024-01-13

## 2024-01-06 RX ORDER — MULTIVIT-MIN/FERROUS GLUCONATE 9 MG/15 ML
1 LIQUID (ML) ORAL
Qty: 0 | Refills: 0 | DISCHARGE

## 2024-01-06 RX ORDER — LANOLIN ALCOHOL/MO/W.PET/CERES
3 CREAM (GRAM) TOPICAL AT BEDTIME
Refills: 0 | Status: DISCONTINUED | OUTPATIENT
Start: 2024-01-06 | End: 2024-01-13

## 2024-01-06 RX ORDER — LEVOTHYROXINE SODIUM 125 MCG
1 TABLET ORAL
Refills: 0 | DISCHARGE

## 2024-01-06 RX ORDER — DILTIAZEM HCL 120 MG
120 CAPSULE, EXT RELEASE 24 HR ORAL DAILY
Refills: 0 | Status: DISCONTINUED | OUTPATIENT
Start: 2024-01-06 | End: 2024-01-07

## 2024-01-06 RX ORDER — LISINOPRIL 2.5 MG/1
20 TABLET ORAL DAILY
Refills: 0 | Status: DISCONTINUED | OUTPATIENT
Start: 2024-01-06 | End: 2024-01-08

## 2024-01-06 RX ORDER — OMEGA-3 ACID ETHYL ESTERS 1 G
0 CAPSULE ORAL
Qty: 0 | Refills: 0 | DISCHARGE

## 2024-01-06 RX ADMIN — SODIUM CHLORIDE 500 MILLILITER(S): 9 INJECTION INTRAMUSCULAR; INTRAVENOUS; SUBCUTANEOUS at 18:30

## 2024-01-06 NOTE — H&P ADULT - NSICDXPASTMEDICALHX_GEN_ALL_CORE_FT
Physical Therapy  Visit Type: initial evaluation  Treatment diagnosis: Gait instability, weakness  Precautions:  Medical precautions: ; contact precautions and droplet precautions. COVID (+), pneumonia, CHEO on CKD. Hx HTN, TIA, [place]  Lines:     Basic: capped IV      Lines in chart and on patient reviewed, cautions maintained throughout session.  Hearing: hard of hearing  Safety Measures: chair alarm    SUBJECTIVE  Patient agreed to participate in therapy this date.  \"I haven't been able to sleep\"Patient has not been hospitalized, in a skilled nursing facility, or seen by home health in the last 30 days.     OBJECTIVE     Strength (out of 5 unless otherwise indicated)   Comments / Details:  Generalized weakness    Bed Mobility:        Supine to sit: supervision  Training completed:    Tasks: supine to sit    Education details: patient safety  Transfers:    Assistive devices: gait belt    Sit to stand: minimal assist    Stand to sit: minimal assist  Training completed:    Tasks: stand to sit and sit to stand    Education details: patient safety    Steadying assist  Gait/Ambulation:     Assistance: minimal assist   Assistive device: gait belt    Distance (ft): 45; 60    Type: unsteady  Training Completed:    Tasks: gait training on level surfaces    Education details: patient safety    1 initial posterior LOB that required min-mod assist for stability. After initial LOB only light steadying assist needed. Offered trial of cane or wheeled walker, pt declined      Interventions     Training provided: activity tolerance, balance retraining, transfer training, gait training and safety training    Skilled input: Verbal instruction/cues  Verbal Consent: Writer verbally educated and received verbal consent for hand placement, positioning of patient, and techniques to be performed today from patient for clothing adjustments for techniques, hand placement and palpation for techniques and therapist position for techniques as  described above and how they are pertinent to the patient's plan of care.       ASSESSMENT    Impairments: strength, activity tolerance, balance deficits and safety awareness  Functional Limitations: all functional mobility    Patient seen on 6th floor nursing unit. He presents below baseline  which was independent  with  mobility. Currently lives alone in a(n) house.  For safe return to prior living situation the patient needs to be modified independent  for overall mobility.  Pt admitted with COVID (+), pneumonia, CHEO on CKD. Hx HTN, TIA, [place].    Physical Therapy evaluation session today focused on bed mobility, transfers, gait 45' and 60' with no device. Overall pt supervision for bed mobility, min assist for transfers, and min-mod assist for gait. 1 LOB when initially ambulating, balance quickly improved to only needing min assist for steadying. Offered trial of cane or wheeled walker, pt refused. SpO2 96% with activity on room air. Anticipate return home with home therapy when medically stable. Possible wheeled walker or cane need for home, will continue to discuss with pt.          Discharge Recommendations  Recommendation for Discharge: PT WI: Home, Home therapy             PT/OT Mobility Equipment for Discharge: possible ww or cane need      PT Identified Barriers to Discharge: balance   Skilled therapy is required to address these limitations in attempt to maximize the patient's independence.  Predicted patient presentation: Moderate (evolving) - Patient comorbidities and complexities, as defined above, may have varying impact on steady progress for prescribed plan of care.    End of Session:   Location: in chair  Safety measures: alarm system in place/re-engaged and call light within reach  Handoff to: nurse assistant    PLAN    Suggestions for next session as indicated: Ask about stairs at home, bed mobility, transfers, gait-trial cane or wheeled walker as appropriate/if pt agreeable, [place] Balance  Test          Frequency Comments: 5/4, M-F, OBS, (HT), KA   Interventions: balance, body mechanics, gait training, strengthening, bed mobility, HEP train/position, patient/family training, equipment eval/education, safety education, stairs retraining and functional transfer training  Agreement to plan and goals: patient agrees with goals and treatment plan        GOALS  Review Date: 5/11/2021  Long Term Goals: (to be met by time of discharge from hospital)  Sit to supine: Patient will complete sit to supine modified independent.  Supine to sit: Patient will complete supine to sit modified independent.  Sit to stand: Patient will complete sit to stand transfer with independent.   Stand to sit: Patient will complete stand to sit transfer with independent.   Ambulation (even): Patient will ambulate on even surface for 150 feet with independent.     Documented in the chart in the following areas:  Services. Prior Level of Function. Pain. Assessment. Patient Education.      Admitting diagnosis: Dehydration (E86.0);Fatigue, unspecified type (R53.83);COVID-19 virus infection (U07.1)    Co-morbidities and problem list:   Patient Active Problem List:   PUD (peptic ulcer disease)   Hyperlipidemia   CVA (cerebral infarction)   Esophagitis   CKD (chronic kidney disease) stage 3, GFR 30-59 ml/min (CMS/[facility])   Essential hypertension    Treatment Diagnosis: Gait instability, weakness    The referring provider's electronic signature on the evaluation authorizes the therapy plan of care and certifies the need for these services, furnished under this plan of care while under their care.      Therapy procedure time and total treatment time can be found documented on the Time Entry flowsheet   PAST MEDICAL HISTORY:  Atrial tachycardia     HTN - Hypertension     Hypercholesteremia     Hypothyroid

## 2024-01-06 NOTE — ED PROVIDER NOTE - PHYSICAL EXAMINATION
GEN - NAD, well appearing, A&Ox3  HEAD - NC/AT  EYES - PERRL, EOMI  ENT - Airway patent, mucous membranes moist  PULMONARY - CTA b/l, symmetric breath sounds, no W/R/R  CARDIAC - +S1S2, +irregular hearth rhythm with rate fluctuating between 60s-120s intermittently on cardiac monitor, no M/G/R, no JVD  ABDOMEN - +BS, ND, NT, soft, no guarding, no rebound, no masses, no rigidity   - No CVA TTP b/l  EXTREMITIES - FROM, symmetric pulses, 1+ non-pitting edema b/l  SKIN - No rash or bruising  NEUROLOGIC - Alert, speech clear, no focal deficits  PSYCH - Normal mood/affect, normal insight

## 2024-01-06 NOTE — H&P ADULT - HISTORY OF PRESENT ILLNESS
89 yo female w/ pmh of Atrial Tachycardia, Htn, Hld, Hypothyrodism, anxiety presents with 3 days of intermittent palpitations. pt states that she started to feel palpitations this past Thursday without any other associated sxs. She first thought the palpitations were due to her anxiety so she took one Xanax she has prescribed for anxiety prn which never relieved the palpitations States she called her outpatient cardiologist and left a message with the nurse on staff. States the palpitations come and go intermittently throughout each day. The patient was then told from outpatient cardio the next day to take one extra metoprolol tartrate 50mg in the middle of the day to help relieve her sxs which she believes does help, but they keep coming back. States that she took an extra dose of metoprolol yesterday at noon and today at noon but has not taken her scheduled evening dose today. Denies dizziness, chest pain, arm pain, jaw pain, n/v/d/c, abd pain, sob, lightheadedness.      ED course  Vitals: T 98.2, HR 59, /79, 184/72, RR 16, SpO2 98% on RA  Significant labs: Na 133, BUN/Cr 26/.92, Gluc 119, proBNP 1086 Negative: Trop, TSH, CK, CKMB,   Imaging: CXR: Negative for acute parenchymal disease  CTA: Negative for PE. No airspace consolidation  EKG: NSR @ 117bpm  In ED patient given: 500ml NS Bolus   89 yo female w/ pmh of Atrial Tachycardia, Htn, Hld, Hypothyrodism, anxiety presents with 3 days of intermittent palpitations. pt states that she started to feel palpitations this past Thursday without any other associated sxs. She first thought the palpitations were due to her anxiety so she took one Xanax she has prescribed for anxiety prn which never relieved the palpitations States she called her outpatient cardiologist and left a message with the nurse on staff. States the palpitations come and go intermittently throughout each day. The patient was then told from outpatient cardio the next day to take one extra metoprolol tartrate 50mg in the middle of the day to help relieve her sxs which she believes does help, but they keep coming back. States that she took an extra dose of metoprolol yesterday at noon and today at noon but has not taken her scheduled evening dose today. Denies dizziness, chest pain, arm pain, jaw pain, n/v/d/c, abd pain, sob, lightheadedness.      ED course  Vitals: T 98.2, HR 59, /79, 184/72, RR 16, SpO2 98% on RA  Significant labs: Na 133, BUN/Cr 26/.92, Gluc 119, proBNP 1086 Negative: Trop, TSH, CK, CKMB,   Imaging: CXR: Negative for acute parenchymal disease  CTA: Negative for PE. No airspace consolidation  EKG: NSR @ 117bpm  In ED patient given: 500ml NS Bolus

## 2024-01-06 NOTE — ED ADULT NURSE NOTE - CHPI ED NUR SYMPTOMS POS
March 1, 2023      Bryon Whaley  [address] [place] [address]        To Whom It May Concern:      Bryon Whaley was seen in our clinic on 3/1/2023 for his illness and is recommended to stay home at least today and continuing until feeling well enough to participate and no fever for >24 hours. After that he may return to school without restrictions.      Sincerely,        Angelika Russell MD           PALPITATIONS

## 2024-01-06 NOTE — H&P ADULT - NSHPREVIEWOFSYSTEMS_GEN_ALL_CORE
CONSTITUTIONAL: No weakness, fevers or chills  HEENT:  No headache, no visual changes, no sore throat  RESPIRATORY: No cough, wheezing, hemoptysis; No shortness of breath  CARDIOVASCULAR: +palpitations, No chest pain   GASTROINTESTINAL: No abdominal pain, no nausea, vomiting, or hematemesis; No diarrhea or constipation. No melena or hematochezia.  GENITOURINARY: No dysuria, frequency or hematuria  NEUROLOGICAL: No focal weakness or dizziness   MSK: No myalgias  SKIN: No itching, burning, rashes, or lesions

## 2024-01-06 NOTE — H&P ADULT - NSHPSOCIALHISTORY_GEN_ALL_CORE
Tobacco: Denies  EtOH:  Denies  Recreational drug use: Denies  Lives with: Daughter  Ambulates: w/ walker  ADLs: Independent

## 2024-01-06 NOTE — ED ADULT TRIAGE NOTE - ACCOMPANIED BY
[facility]  INFECTIOUS DISEASES AND INTERNAL MEDICINE at [place]  =======================================================  Gabriel Levy MD  Diplomates American Board of Internal Medicine and Infectious Diseases  =======================================================    [id number]  AIMEE SOLER     Follow up: R knee septic arthritis    No more fever. No new complaint.   Pain is controlled.     PAST MEDICAL & SURGICAL HISTORY:  GERD (gastroesophageal reflux disease)  HLD (hyperlipidemia)  No significant past surgical history    FMH:  No significant family history    SOC Hx:   Denies etoh, tobacco, or drug use     Allergies  No Known Allergies    Antibiotics:  Cefepime     REVIEW OF SYSTEMS:  CONSTITUTIONAL:  No Fever or chills  HEENT:  No diplopia or blurred vision.  No sore throat or runny nose.  CARDIOVASCULAR:  No chest pain or SOB.  RESPIRATORY:  No cough, shortness of breath, PND or orthopnea.  GASTROINTESTINAL:  No nausea, vomiting or diarrhea.  GENITOURINARY:  No dysuria, frequency or urgency. No Blood in urine  MUSCULOSKELETAL: pain in legs  SKIN:  No change in skin, hair or nails.  NEUROLOGIC:  No paresthesias, fasciculations, seizures or weakness.  PSYCHIATRIC:  No disorder of thought or mood.  ENDOCRINE:  No heat or cold intolerance, polyuria or polydipsia.  HEMATOLOGICAL:  No easy bruising or bleeding.     Physical Exam:  Vital Signs Last 24 Hrs  T(C): 37.4 (2019 07:38), Max: 37.6 (2019 15:35)  T(F): 99.4 (2019 07:38), Max: 99.6 (2019 15:35)  HR: 102 (2019 07:38) (91 - 103)  BP: 138/75 (2019 07:38) (115/72 - 138/75)  RR: 18 (2019 07:38) (18 - 18)  SpO2: 99% (2019 07:38) (97% - 99%)  GEN: NAD  HEENT: normocephalic and atraumatic. EOMI. PERRL.    NECK: Supple.  No lymphadenopathy   LUNGS: Clear to auscultation.  HEART: Regular rate and rhythm without murmur.  ABDOMEN: Soft, nontender, and nondistended.  Positive bowel sounds.    : No CVA tenderness  EXTREMITIES: R leg dressed with a drain, left with fixator   NEUROLOGIC: grossly intact.  PSYCHIATRIC: Appropriate affect .  SKIN: No ulceration or induration present.    Labs:      134<L>  |  97<L>  |  15.0  ----------------------------<  138<H>  4.6   |  26.0  |  0.77    Ca    9.1      2019 09:15  Phos  2.9       Mg     2.4         TPro  8.2  /  Alb  2.7<L>  /  TBili  0.7  /  DBili  0.2  /  AST  82<H>  /  ALT  96<H>  /  AlkPhos  140<H>                          8.5    8.25  )-----------( 580      ( 2019 09:15 )             28.0     Urinalysis Basic - ( 2019 16:03 )    Color: Yellow / Appearance: Clear / S.010 / pH: x  Gluc: x / Ketone: Negative  / Bili: Negative / Urobili: Negative mg/dL   Blood: x / Protein: 30 mg/dL / Nitrite: Negative   Leuk Esterase: Negative / RBC: 0-2 /HPF / WBC 0-2   Sq Epi: x / Non Sq Epi: Few / Bacteria: Negative    LIVER FUNCTIONS - ( 2019 09:15 )  Alb: 2.7 g/dL / Pro: 8.2 g/dL / ALK PHOS: 140 U/L / ALT: 96 U/L / AST: 82 U/L / GGT: x           RECENT CULTURES:  - @ 00:19 .Blood     No growth at 48 hours    - @ 15:02 .Surgical Swab Right knee joint (swabs) Pseudomonas aeruginosa    Pseudomonas aeruginosa Growing in broth media only  Culture in progress    Few White blood cells  No organisms seen    07 @ 15:00 .Surgical Swab Right knee (swabs) Pseudomonas aeruginosa    Few Pseudomonas aeruginosa  Culture in progress    Few White blood cells  No organisms seen    07 @ 15:37 .Other right knee aspiration (swabs) Pseudomonas aeruginosa    Few Pseudomonas aeruginosa    07 @ 15:16 .Body Fluid right knee synovial fluid Pseudomonas aeruginosa    Few Pseudomonas aeruginosa    Numerous White blood cells  No organisms seen    07 @ 14:25 .Blood     No growth at 5 days.     @ 02:49 .Blood     No growth at 5 days.    All imaging and other data have been reviewed. Immediate family member

## 2024-01-06 NOTE — H&P ADULT - ASSESSMENT
89 yo female w/ pmh of Atrial Tachycardia, Htn, Hld, Hypothyrodism, Anxiety admitted for palpitations.

## 2024-01-06 NOTE — H&P ADULT - PROBLEM SELECTOR PLAN 2
Na noted to be 133 on admission  -Given 500mL NS in ED  -Monitor daily BMP  -Patient endorses normal PO intake at home

## 2024-01-06 NOTE — ED ADULT NURSE NOTE - NSFALLHARMRISKINTERV_ED_ALL_ED
Assistance OOB with selected safe patient handling equipment if applicable/Assistance with ambulation/Communicate risk of Fall with Harm to all staff, patient, and family/Provide visual cue: red socks, yellow wristband, yellow gown, etc/Reinforce activity limits and safety measures with patient and family/Bed in lowest position, wheels locked, appropriate side rails in place/Call bell, personal items and telephone in reach/Instruct patient to call for assistance before getting out of bed/chair/stretcher/Non-slip footwear applied when patient is off stretcher/[place] to call system/Physically safe environment - no spills, clutter or unnecessary equipment/Purposeful Proactive Rounding/Room/bathroom lighting operational, light cord in reach Assistance OOB with selected safe patient handling equipment if applicable/Assistance with ambulation/Communicate risk of Fall with Harm to all staff, patient, and family/Provide visual cue: red socks, yellow wristband, yellow gown, etc/Reinforce activity limits and safety measures with patient and family/Bed in lowest position, wheels locked, appropriate side rails in place/Call bell, personal items and telephone in reach/Instruct patient to call for assistance before getting out of bed/chair/stretcher/Non-slip footwear applied when patient is off stretcher/[place] to call system/Physically safe environment - no spills, clutter or unnecessary equipment/Purposeful Proactive Rounding/Room/bathroom lighting operational, light cord in reach

## 2024-01-06 NOTE — ED PROVIDER NOTE - NSICDXPASTMEDICALHX_GEN_ALL_CORE_FT
PAST MEDICAL HISTORY:  Atrial tachycardia     HTN - Hypertension     Hypercholesteremia     Hypothyroid

## 2024-01-06 NOTE — H&P ADULT - PROBLEM SELECTOR PLAN 1
H/o intermittent palpitations x3 days, instructed to take an extra dose of metoprolol tartrate 50mg yesterday and today  -CTA: Negative for PE, negative for lung consolidation  -EKG: NSR @ 117bpm  -No CP, SOB associated   -Patient currently in and out of tachycardia/bradycardia (123bpm/59bpm) respectively so hold off increased metoprolol at this time  -Continue home diltiazem, metoprolol tartrate  -Monitor on Telemetry  -Monitor routine hemodynamics  -Cardiology consulted ([facility]), f/u recs H/o intermittent palpitations x3 days, instructed to take an extra dose of metoprolol tartrate 50mg yesterday and today  -CTA: Negative for PE, negative for lung consolidation  -EKG: NSR @ 117bpm  -No CP, SOB associated   -Patient currently in and out of tachycardia/bradycardia (123bpm/59bpm) respectively so hold off increased metoprolol at this time  -5mg IV Lopressor push for persistent tachycardia   -Continue home diltiazem, metoprolol tartrate  -Monitor on Telemetry  -Monitor routine hemodynamics  -Cardiology consulted ([facility]), f/u recs H/o intermittent palpitations x3 days, instructed to take an extra dose of metoprolol tartrate 50mg yesterday and today  -CTA: Negative for PE, negative for lung consolidation  -EKG: NSR @ 117bpm  -No CP, SOB associated   -Patient currently in and out of tachycardia/bradycardia (123bpm/59bpm) respectively so hold off increased metoprolol at this time  -5mg IV Lopressor push for persistent tachycardia if necessary  -Continue home diltiazem, metoprolol tartrate  -Monitor on Telemetry  -Monitor routine hemodynamics  -Cardiology consulted ([facility]), f/u recs

## 2024-01-06 NOTE — ED ADULT NURSE NOTE - CHPI ED NUR SYMPTOMS NEG
PPM interrogation reviewed. Device is functioning normally. Cardiology will sign off at this time. no back pain/no chest pain/no chills/no congestion/no diaphoresis/no dizziness/no fever/no shortness of breath/no syncope/no vomiting

## 2024-01-06 NOTE — H&P ADULT - NSHPPHYSICALEXAM_GEN_ALL_CORE
T(C): 36.8 (01-06-24 @ 17:31), Max: 36.8 (01-06-24 @ 17:17)  HR: 59 (01-06-24 @ 19:02) (48 - 124)  BP: 184/79 (01-06-24 @ 19:02) (184/79 - 199/81)  RR: 16 (01-06-24 @ 19:02) (16 - 18)  SpO2: 98% (01-06-24 @ 19:02) (98% - 98%)    GENERAL: patient appears well, no acute distress, appropriately interactive  EYES: sclera clear, no exudates  ENMT: oropharynx clear without erythema, no exudates, moist mucous membranes  NECK: supple, soft  LUNGS: good air entry bilaterally, clear to auscultation, symmetric breath sounds, no wheezing or rhonchi appreciated  HEART: Tachycardic w/ irregular rhythm, soft S1/S2, no murmurs noted, mild nonpitting extremity edema B/L  GASTROINTESTINAL: abdomen is soft, nontender, nondistended, normoactive bowel sounds  INTEGUMENT: good skin turgor, warm skin, appears well perfused  MUSCULOSKELETAL: no clubbing or cyanosis, no obvious deformity  NEUROLOGIC: awake, alert, oriented x3, good muscle tone in all 4 extremities  HEME/LYMPH: no obvious ecchymosis or petechiae

## 2024-01-06 NOTE — ED PROVIDER NOTE - OBJECTIVE STATEMENT
The patient is a 89y Female with pmhx of atrial tachycardia, HTN, HLD, hypothyroidism, anxiety p/w 3 days of intermittent palpitations. Pt's daughter at bedside. Pt states that she started to feel palpitations this past Thursday without any other associated symptoms. Pt first thought the palpitations were due to her anxiety, so she took one Xanax that she has prescribed for PRN anxiety, but this did not relieve the palpitations. Says the palpitations come and go intermittently each day. Pt called her outpatient cardiologist and left a message with the nurse on staff. The patient was then told by her outpatient cardiologist the next day to take one extra metoprolol tartrate 50mg in the middle of the day to help relieve her symptoms. Pt believes this helped, but palpitations returned later in the day. States that she took an extra dose of metoprolol yesterday at noon and today at noon but has not taken her scheduled evening dose today. Denies cp, sob, lightheadedness, abd pain, n/v/d, leg swelling, orthopnea. Allergic to PCN.

## 2024-01-06 NOTE — ED PROVIDER NOTE - CLINICAL SUMMARY MEDICAL DECISION MAKING FREE TEXT BOX
Darnell Lopez MD (Attending Physician): The patient is a 89y Female with pmhx of atrial tachycardia, HTN, HLD, hypothyroidism, anxiety p/w 3 days of intermittent palpitations. DDx includes, but not limited to: arrythmia, ACS, PE, PNA, valvular disease, anxiety, hyperthyroidism, anemia, UTI. ekg, cxr, CTA chest, labs, urine, IVF. BP stable at this time. Will most likely require admission for cardiology evaluation due to irregular heart rhythm with fluctuating HR between the 60s-120s.

## 2024-01-06 NOTE — H&P ADULT - ATTENDING COMMENTS
89 y/o female presented to ED to be evaluated for palpitation  denies any chest pain/shortness of breath associated withe  -2d echo  -cardiology consult  -keep Mg>2 K>4 for arrythmia suppression  -continue home metoprolol and Cardizem  -monitor on tele   -CTA w/o PE   -F/u TSH 89 y/o female presented to ED to be evaluated for palpitation  denies any chest pain/shortness of breath associated withe  -2d echo  -orthostatic VS   -cardiology consult  -keep Mg>2 K>4 for arrythmia suppression  -holding off home metoprolol and Cardizem given current HR (in high 50's )  -monitor on tele   -CTA w/o PE   -F/u TSH 89 y/o female presented to ED to be evaluated for palpitation  denies any chest pain/shortness of breath associated withe  -2d echo  -orthostatic VS   -cardiology consult  -keep Mg>2 K>4 for arrythmia suppression  -holding off home metoprolol and Cardizem given current HR (in high 50's )  -monitor on tele   -CTA w/o PE   -F/u TSH

## 2024-01-06 NOTE — H&P ADULT - PROBLEM SELECTOR PLAN 3
/ 79 on admission  -Pt endorses not getting any of her evening HTN medications, give STAT dose of clonidine  -Continue home clonidine, diltiazem, metoprolol, fosinopril with therapeutic interchange  -Monitor routine hemodynamics

## 2024-01-06 NOTE — ED ADULT NURSE NOTE - OBJECTIVE STATEMENT
Patient is 89yo F BIB daughter with c/o palpitations for several weeks. Patient reports she has h/o tachycardia where she takes metoprolol and diltiazem, reports her PMD told her to take an extra Metroprolol when she develops the symptoms, patient states she has been doing this but felt worse today, reports she does not feel right. Patient also admits to using PRN xanax, possibly more in the last couple of weeks than usual, reports she felt like the palpitations might be related to anxiety. Patient also reports she was started on Levothyroxine for hypothyroid one year ago, reports weight loss, reports levels have not been checked in months. Patient denies chest pain, SOB, dizziness, back pain, abdominal pain, leg pain during these episodes, endorses feeling palpitations and anxiety. Patient with some swelling around the ankle. Patients heart rate is ranging from , not changing with movement, rate noted 48. MD Lopez aware.

## 2024-01-06 NOTE — ED ADULT NURSE NOTE - PERIPHERAL PULSES
equal bilaterally Topical Retinoid counseling:  Patient advised to apply a pea-sized amount only at bedtime and wait 30 minutes after washing their face before applying.  If too drying, patient may add a non-comedogenic moisturizer. The patient verbalized understanding of the proper use and possible adverse effects of retinoids.  All of the patient's questions and concerns were addressed.

## 2024-01-07 LAB
ANION GAP SERPL CALC-SCNC: 7 MMOL/L — SIGNIFICANT CHANGE UP (ref 5–17)
ANION GAP SERPL CALC-SCNC: 7 MMOL/L — SIGNIFICANT CHANGE UP (ref 5–17)
BUN SERPL-MCNC: 17 MG/DL — SIGNIFICANT CHANGE UP (ref 7–23)
BUN SERPL-MCNC: 17 MG/DL — SIGNIFICANT CHANGE UP (ref 7–23)
CALCIUM SERPL-MCNC: 8.6 MG/DL — SIGNIFICANT CHANGE UP (ref 8.5–10.1)
CALCIUM SERPL-MCNC: 8.6 MG/DL — SIGNIFICANT CHANGE UP (ref 8.5–10.1)
CHLORIDE SERPL-SCNC: 103 MMOL/L — SIGNIFICANT CHANGE UP (ref 96–108)
CHLORIDE SERPL-SCNC: 103 MMOL/L — SIGNIFICANT CHANGE UP (ref 96–108)
CHOLEST SERPL-MCNC: 211 MG/DL — HIGH
CHOLEST SERPL-MCNC: 211 MG/DL — HIGH
CO2 SERPL-SCNC: 26 MMOL/L — SIGNIFICANT CHANGE UP (ref 22–31)
CO2 SERPL-SCNC: 26 MMOL/L — SIGNIFICANT CHANGE UP (ref 22–31)
CREAT SERPL-MCNC: 0.77 MG/DL — SIGNIFICANT CHANGE UP (ref 0.5–1.3)
CREAT SERPL-MCNC: 0.77 MG/DL — SIGNIFICANT CHANGE UP (ref 0.5–1.3)
EGFR: 71 ML/MIN/1.73M2 — SIGNIFICANT CHANGE UP
EGFR: 71 ML/MIN/1.73M2 — SIGNIFICANT CHANGE UP
GLUCOSE SERPL-MCNC: 92 MG/DL — SIGNIFICANT CHANGE UP (ref 70–99)
GLUCOSE SERPL-MCNC: 92 MG/DL — SIGNIFICANT CHANGE UP (ref 70–99)
HCT VFR BLD CALC: 38.1 % — SIGNIFICANT CHANGE UP (ref 34.5–45)
HCT VFR BLD CALC: 38.1 % — SIGNIFICANT CHANGE UP (ref 34.5–45)
HDLC SERPL-MCNC: 63 MG/DL — SIGNIFICANT CHANGE UP
HDLC SERPL-MCNC: 63 MG/DL — SIGNIFICANT CHANGE UP
HGB BLD-MCNC: 12.9 G/DL — SIGNIFICANT CHANGE UP (ref 11.5–15.5)
HGB BLD-MCNC: 12.9 G/DL — SIGNIFICANT CHANGE UP (ref 11.5–15.5)
LIPID PNL WITH DIRECT LDL SERPL: 140 MG/DL — HIGH
LIPID PNL WITH DIRECT LDL SERPL: 140 MG/DL — HIGH
MCHC RBC-ENTMCNC: 29.7 PG — SIGNIFICANT CHANGE UP (ref 27–34)
MCHC RBC-ENTMCNC: 29.7 PG — SIGNIFICANT CHANGE UP (ref 27–34)
MCHC RBC-ENTMCNC: 33.9 GM/DL — SIGNIFICANT CHANGE UP (ref 32–36)
MCHC RBC-ENTMCNC: 33.9 GM/DL — SIGNIFICANT CHANGE UP (ref 32–36)
MCV RBC AUTO: 87.8 FL — SIGNIFICANT CHANGE UP (ref 80–100)
MCV RBC AUTO: 87.8 FL — SIGNIFICANT CHANGE UP (ref 80–100)
NON HDL CHOLESTEROL: 148 MG/DL — HIGH
NON HDL CHOLESTEROL: 148 MG/DL — HIGH
NRBC # BLD: 0 /100 WBCS — SIGNIFICANT CHANGE UP (ref 0–0)
NRBC # BLD: 0 /100 WBCS — SIGNIFICANT CHANGE UP (ref 0–0)
PLATELET # BLD AUTO: 237 K/UL — SIGNIFICANT CHANGE UP (ref 150–400)
PLATELET # BLD AUTO: 237 K/UL — SIGNIFICANT CHANGE UP (ref 150–400)
POTASSIUM SERPL-MCNC: 3.8 MMOL/L — SIGNIFICANT CHANGE UP (ref 3.5–5.3)
POTASSIUM SERPL-MCNC: 3.8 MMOL/L — SIGNIFICANT CHANGE UP (ref 3.5–5.3)
POTASSIUM SERPL-SCNC: 3.8 MMOL/L — SIGNIFICANT CHANGE UP (ref 3.5–5.3)
POTASSIUM SERPL-SCNC: 3.8 MMOL/L — SIGNIFICANT CHANGE UP (ref 3.5–5.3)
RBC # BLD: 4.34 M/UL — SIGNIFICANT CHANGE UP (ref 3.8–5.2)
RBC # BLD: 4.34 M/UL — SIGNIFICANT CHANGE UP (ref 3.8–5.2)
RBC # FLD: 13.9 % — SIGNIFICANT CHANGE UP (ref 10.3–14.5)
RBC # FLD: 13.9 % — SIGNIFICANT CHANGE UP (ref 10.3–14.5)
SODIUM SERPL-SCNC: 136 MMOL/L — SIGNIFICANT CHANGE UP (ref 135–145)
SODIUM SERPL-SCNC: 136 MMOL/L — SIGNIFICANT CHANGE UP (ref 135–145)
TRIGL SERPL-MCNC: 48 MG/DL — SIGNIFICANT CHANGE UP
TRIGL SERPL-MCNC: 48 MG/DL — SIGNIFICANT CHANGE UP
WBC # BLD: 9.95 K/UL — SIGNIFICANT CHANGE UP (ref 3.8–10.5)
WBC # BLD: 9.95 K/UL — SIGNIFICANT CHANGE UP (ref 3.8–10.5)
WBC # FLD AUTO: 9.95 K/UL — SIGNIFICANT CHANGE UP (ref 3.8–10.5)
WBC # FLD AUTO: 9.95 K/UL — SIGNIFICANT CHANGE UP (ref 3.8–10.5)

## 2024-01-07 PROCEDURE — 99233 SBSQ HOSP IP/OBS HIGH 50: CPT

## 2024-01-07 PROCEDURE — 99223 1ST HOSP IP/OBS HIGH 75: CPT

## 2024-01-07 PROCEDURE — 93010 ELECTROCARDIOGRAM REPORT: CPT

## 2024-01-07 RX ORDER — SOTALOL HCL 120 MG
80 TABLET ORAL EVERY 12 HOURS
Refills: 0 | Status: DISCONTINUED | OUTPATIENT
Start: 2024-01-07 | End: 2024-01-10

## 2024-01-07 RX ORDER — HYDRALAZINE HCL 50 MG
10 TABLET ORAL EVERY 6 HOURS
Refills: 0 | Status: DISCONTINUED | OUTPATIENT
Start: 2024-01-07 | End: 2024-01-08

## 2024-01-07 RX ADMIN — Medication 10 MILLIGRAM(S): at 08:54

## 2024-01-07 RX ADMIN — Medication 10 MILLIGRAM(S): at 22:03

## 2024-01-07 RX ADMIN — Medication 10 MILLIGRAM(S): at 14:07

## 2024-01-07 RX ADMIN — Medication 0.1 MILLIGRAM(S): at 06:00

## 2024-01-07 RX ADMIN — LISINOPRIL 20 MILLIGRAM(S): 2.5 TABLET ORAL at 06:01

## 2024-01-07 RX ADMIN — SIMVASTATIN 10 MILLIGRAM(S): 20 TABLET, FILM COATED ORAL at 22:04

## 2024-01-07 RX ADMIN — Medication 650 MILLIGRAM(S): at 06:08

## 2024-01-07 RX ADMIN — Medication 80 MILLIGRAM(S): at 08:55

## 2024-01-07 RX ADMIN — Medication 1 TABLET(S): at 12:48

## 2024-01-07 RX ADMIN — Medication 0.1 MILLIGRAM(S): at 18:12

## 2024-01-07 RX ADMIN — ENOXAPARIN SODIUM 30 MILLIGRAM(S): 100 INJECTION SUBCUTANEOUS at 06:00

## 2024-01-07 RX ADMIN — Medication 80 MILLIGRAM(S): at 22:03

## 2024-01-07 RX ADMIN — Medication 25 MICROGRAM(S): at 06:01

## 2024-01-07 NOTE — PROGRESS NOTE ADULT - SUBJECTIVE AND OBJECTIVE BOX
Patient is a 89y old  Female who presents with a chief complaint of Palpitations (07 Jan 2024 08:31)      Subjective:  INTERVAL HPI/OVERNIGHT EVENTS: Patient seen and examined at bedside.  Patient c/o intermittent palpitation   MEDICATIONS  (STANDING):  cloNIDine 0.1 milliGRAM(s) Oral two times a day  enoxaparin Injectable 30 milliGRAM(s) SubCutaneous every 24 hours  hydrALAZINE Injectable 10 milliGRAM(s) IV Push every 6 hours  levothyroxine 25 MICROGram(s) Oral daily  lisinopril 20 milliGRAM(s) Oral daily  multivitamin/minerals 1 Tablet(s) Oral daily  simvastatin 10 milliGRAM(s) Oral at bedtime  sotalol. 80 milliGRAM(s) Oral every 12 hours    MEDICATIONS  (PRN):  acetaminophen     Tablet .. 650 milliGRAM(s) Oral every 6 hours PRN Temp greater or equal to 38C (100.4F), Mild Pain (1 - 3)  melatonin 3 milliGRAM(s) Oral at bedtime PRN Insomnia      Allergies    penicillins (Unknown)    Intolerances        REVIEW OF SYSTEMS:  CONSTITUTIONAL: No fever or chills  HEENT:  No headache, no sore throat  RESPIRATORY: No cough or shortness of breath  CARDIOVASCULAR: + palpitations  GASTROINTESTINAL: No abd pain, nausea, vomiting, or diarrhea      Objective:  Vital Signs Last 24 Hrs  T(C): 36.6 (07 Jan 2024 11:56), Max: 36.8 (06 Jan 2024 17:17)  T(F): 97.9 (07 Jan 2024 11:56), Max: 98.2 (06 Jan 2024 17:17)  HR: 55 (07 Jan 2024 13:59) (48 - 124)  BP: 145/70 (07 Jan 2024 13:59) (114/56 - 199/81)  BP(mean): --  RR: 20 (07 Jan 2024 13:59) (16 - 20)  SpO2: 97% (07 Jan 2024 13:59) (97% - 99%)    Parameters below as of 07 Jan 2024 13:59  Patient On (Oxygen Delivery Method): room air        GENERAL: NAD, lying in bed   HEAD:  Normocephalic  EYES:  conjunctiva and sclera clear  ENT: Moist mucous membranes  NECK: Supple  CHEST/LUNG: Clear to auscultation bilaterally; No rales or rhonchi; no wheezing. Unlabored respirations  HEART: Regular rate and rhythm; S1S2+  ABDOMEN: Bowel sounds present; Soft, Nontender, Nondistended.   EXTREMITIES:  + distal Peripheral Pulses;  No cyanosis, or edema  NERVOUS SYSTEM:  Alert & Oriented X3;  No gross focal deficits   MSK: moves all extremities  SKIN: No rashes     LABS:                        12.9   9.95  )-----------( 237      ( 07 Jan 2024 05:35 )             38.1     07 Jan 2024 05:35    136    |  103    |  17     ----------------------------<  92     3.8     |  26     |  0.77     Ca    8.6        07 Jan 2024 05:35  Mg     2.2       06 Jan 2024 18:11    TPro  7.8    /  Alb  3.7    /  TBili  0.3    /  DBili  x      /  AST  20     /  ALT  18     /  AlkPhos  94     06 Jan 2024 18:11    PT/INR - ( 06 Jan 2024 18:11 )   PT: 10.8 sec;   INR: 0.92 ratio         PTT - ( 06 Jan 2024 18:11 )  PTT:27.0 sec  Urinalysis Basic - ( 07 Jan 2024 05:35 )    Color: x / Appearance: x / SG: x / pH: x  Gluc: 92 mg/dL / Ketone: x  / Bili: x / Urobili: x   Blood: x / Protein: x / Nitrite: x   Leuk Esterase: x / RBC: x / WBC x   Sq Epi: x / Non Sq Epi: x / Bacteria: x      CAPILLARY BLOOD GLUCOSE              RADIOLOGY & ADDITIONAL TESTS:    Personally reviewed.     Consultant(s) Notes Reviewed:  [x] YES  [ ] NO    Plan of care discussed with patient; all questions answered

## 2024-01-07 NOTE — CONSULT NOTE ADULT - SUBJECTIVE AND OBJECTIVE BOX
CHIEF COMPLAINT: Patient is a 89y old  Female who presents with a chief complaint of Palpitations (06 Jan 2024 22:28)      HPI:  89 yo female w/ pmh of Atrial Tachycardia, Htn, Hld, Hypothyrodism, anxiety presents with 3 days of intermittent palpitations. pt states that she started to feel palpitations this past Thursday without any other associated sxs. She first thought the palpitations were due to her anxiety so she took one Xanax she has prescribed for anxiety prn which never relieved the palpitations States she called her outpatient cardiologist and left a message with the nurse on staff. States the palpitations come and go intermittently throughout each day. The patient was then told from outpatient cardio the next day to take one extra metoprolol tartrate 50mg in the middle of the day to help relieve her sxs which she believes does help, but they keep coming back. States that she took an extra dose of metoprolol yesterday at noon and today at noon but has not taken her scheduled evening dose today. Denies dizziness, chest pain, arm pain, jaw pain, n/v/d/c, abd pain, sob, lightheadedness.      ED course  Vitals: T 98.2, HR 59, /79, 184/72, RR 16, SpO2 98% on RA  Significant labs: Na 133, BUN/Cr 26/.92, Gluc 119, proBNP 1086 Negative: Trop, TSH, CK, CKMB,   Imaging: CXR: Negative for acute parenchymal disease  CTA: Negative for PE. No airspace consolidation  EKG: NSR @ 117bpm  In ED patient given: 500ml NS Bolus   (06 Jan 2024 22:28)      EKG: SR LAFB    REVIEW OF SYSTEMS:   All other review of systems are negative unless indicated above    PAST MEDICAL & SURGICAL HISTORY:  HTN - Hypertension      Hypercholesteremia      Atrial tachycardia      Hypothyroid      S/P Cataract Surgery  B/L      Liver cyst  removed          SOCIAL HISTORY:  No tobacco, ethanol, or drug abuse.    FAMILY HISTORY:  FH: HTN (hypertension) (Mother)      No family history of acute MI or sudden cardiac death.    MEDICATIONS  (STANDING):  cloNIDine 0.1 milliGRAM(s) Oral two times a day  enoxaparin Injectable 30 milliGRAM(s) SubCutaneous every 24 hours  hydrALAZINE Injectable 10 milliGRAM(s) IV Push every 6 hours  levothyroxine 25 MICROGram(s) Oral daily  lisinopril 20 milliGRAM(s) Oral daily  multivitamin/minerals 1 Tablet(s) Oral daily  simvastatin 10 milliGRAM(s) Oral at bedtime  sotalol. 80 milliGRAM(s) Oral every 12 hours    MEDICATIONS  (PRN):  acetaminophen     Tablet .. 650 milliGRAM(s) Oral every 6 hours PRN Temp greater or equal to 38C (100.4F), Mild Pain (1 - 3)  melatonin 3 milliGRAM(s) Oral at bedtime PRN Insomnia      Allergies    penicillins (Unknown)    Intolerances        Home meds:  Home Medications:  Caltrate 600 + D oral tablet: 1 tab(s) orally once a day (06 Jan 2024 22:28)  cloNIDine 0.1 mg oral tablet: 1 tab(s) orally 2 times a day (06 Jan 2024 22:27)  dilTIAZem 120 mg/24 hours oral tablet, extended release: 1 tab(s) orally once a day (06 Jan 2024 22:27)  Fish Oil oral capsule:  (06 Jan 2024 22:28)  fosinopril 20 mg oral tablet: 1 tab(s) orally once a day (06 Jan 2024 22:27)  levothyroxine 25 mcg (0.025 mg) oral tablet: 1 tab(s) orally once a day (06 Jan 2024 22:27)  Metoprolol Tartrate 50 mg oral tablet: 1 tab(s) orally 2 times a day (06 Jan 2024 22:28)  PreserVision oral tablet: 1 tab(s) orally once a day (06 Jan 2024 22:28)  simvastatin 10 mg oral tablet: 1 tab(s) orally once a day (at bedtime) (06 Jan 2024 22:28)        VITAL SIGNS:   Vital Signs Last 24 Hrs  T(C): 36.7 (07 Jan 2024 05:50), Max: 36.8 (06 Jan 2024 17:17)  T(F): 98 (07 Jan 2024 05:50), Max: 98.2 (06 Jan 2024 17:17)  HR: 65 (07 Jan 2024 06:56) (48 - 124)  BP: 158/72 (07 Jan 2024 06:56) (158/72 - 199/81)  BP(mean): --  RR: 17 (07 Jan 2024 06:56) (16 - 20)  SpO2: 98% (07 Jan 2024 06:56) (98% - 99%)    Parameters below as of 07 Jan 2024 06:56  Patient On (Oxygen Delivery Method): room air        I&O's Summary      On Exam:  TELE: SR runs -130  Constitutional: NAD, awake and alert, well-developed  HEENT: Moist Mucous Membranes, Anicteric  Pulmonary: Decreased breath sounds b/l. No rales, crackles or wheeze appreciated.   Cardiovascular: tachy, S1 and S2, + SM   Gastrointestinal: Bowel Sounds present, soft, nontender.   Lymph: No peripheral edema. No lymphadenopathy.  Skin: No visible rashes or ulcers.  Psych:  Mood & affect appropriate    LABS: All Labs Reviewed:                        12.9   9.95  )-----------( 237      ( 07 Jan 2024 05:35 )             38.1                         13.1   7.43  )-----------( 251      ( 06 Jan 2024 18:11 )             39.6     07 Jan 2024 05:35    136    |  103    |  17     ----------------------------<  92     3.8     |  26     |  0.77   06 Jan 2024 18:11    133    |  102    |  26     ----------------------------<  119    4.1     |  27     |  0.92     Ca    8.6        07 Jan 2024 05:35  Ca    9.0        06 Jan 2024 18:11  Mg     2.2       06 Jan 2024 18:11    TPro  7.8    /  Alb  3.7    /  TBili  0.3    /  DBili  x      /  AST  20     /  ALT  18     /  AlkPhos  94     06 Jan 2024 18:11    PT/INR - ( 06 Jan 2024 18:11 )   PT: 10.8 sec;   INR: 0.92 ratio         PTT - ( 06 Jan 2024 18:11 )  PTT:27.0 sec  CARDIAC MARKERS ( 06 Jan 2024 18:11 )  x     / x     / 91 U/L / x     / 1.9 ng/mL    - Troponin: <-18.9, <-9.4  Blood Culture:     01-06 @ 18:11  TSH: 1.77        RADIOLOGY:                CHIEF COMPLAINT: Patient is a 89y old  Female who presents with a chief complaint of Palpitations (06 Jan 2024 22:28)      HPI:  89 yo female w/ pmh of Atrial Tachycardia, Htn, Hld, Hypothyrodism, anxiety presents with 3 days of intermittent palpitations. pt states that she started to feel palpitations this past Thursday without any other associated sxs. She first thought the palpitations were due to her anxiety so she took one Xanax she has prescribed for anxiety prn which never relieved the palpitations States she called her outpatient cardiologist and left a message with the nurse on staff. States the palpitations come and go intermittently throughout each day. The patient was then told from outpatient cardio the next day to take one extra metoprolol tartrate 50mg in the middle of the day to help relieve her sxs which she believes does help, but they keep coming back. States that she took an extra dose of metoprolol yesterday at noon and today at noon but has not taken her scheduled evening dose today. Denies dizziness, chest pain, arm pain, jaw pain, n/v/d/c, abd pain, sob, lightheadedness.      ED course  Vitals: T 98.2, HR 59, /79, 184/72, RR 16, SpO2 98% on RA  Significant labs: Na 133, BUN/Cr 26/.92, Gluc 119, proBNP 1086 Negative: Trop, TSH, CK, CKMB,   Imaging: CXR: Negative for acute parenchymal disease  CTA: Negative for PE. No airspace consolidation  EKG: NSR @ 117bpm  In ED patient given: 500ml NS Bolus   (06 Jan 2024 22:28)      EKG: SR LAFB    REVIEW OF SYSTEMS:   All other review of systems are negative unless indicated above    PAST MEDICAL & SURGICAL HISTORY:  HTN - Hypertension      Hypercholesteremia      Atrial tachycardia      Hypothyroid      S/P Cataract Surgery  B/L      Liver cyst  removed          SOCIAL HISTORY:  No tobacco, ethanol, or drug abuse.    FAMILY HISTORY:  FH: HTN (hypertension) (Mother)      No family history of acute MI or sudden cardiac death.    MEDICATIONS  (STANDING):  cloNIDine 0.1 milliGRAM(s) Oral two times a day  enoxaparin Injectable 30 milliGRAM(s) SubCutaneous every 24 hours  hydrALAZINE Injectable 10 milliGRAM(s) IV Push every 6 hours  levothyroxine 25 MICROGram(s) Oral daily  lisinopril 20 milliGRAM(s) Oral daily  multivitamin/minerals 1 Tablet(s) Oral daily  simvastatin 10 milliGRAM(s) Oral at bedtime  sotalol. 80 milliGRAM(s) Oral every 12 hours    MEDICATIONS  (PRN):  acetaminophen     Tablet .. 650 milliGRAM(s) Oral every 6 hours PRN Temp greater or equal to 38C (100.4F), Mild Pain (1 - 3)  melatonin 3 milliGRAM(s) Oral at bedtime PRN Insomnia      Allergies    penicillins (Unknown)    Intolerances        Home meds:  Home Medications:  Caltrate 600 + D oral tablet: 1 tab(s) orally once a day (06 Jan 2024 22:28)  cloNIDine 0.1 mg oral tablet: 1 tab(s) orally 2 times a day (06 Jan 2024 22:27)  dilTIAZem 120 mg/24 hours oral tablet, extended release: 1 tab(s) orally once a day (06 Jan 2024 22:27)  Fish Oil oral capsule:  (06 Jan 2024 22:28)  fosinopril 20 mg oral tablet: 1 tab(s) orally once a day (06 Jan 2024 22:27)  levothyroxine 25 mcg (0.025 mg) oral tablet: 1 tab(s) orally once a day (06 Jan 2024 22:27)  Metoprolol Tartrate 50 mg oral tablet: 1 tab(s) orally 2 times a day (06 Jan 2024 22:28)  PreserVision oral tablet: 1 tab(s) orally once a day (06 Jan 2024 22:28)  simvastatin 10 mg oral tablet: 1 tab(s) orally once a day (at bedtime) (06 Jan 2024 22:28)        VITAL SIGNS:   Vital Signs Last 24 Hrs  T(C): 36.7 (07 Jan 2024 05:50), Max: 36.8 (06 Jan 2024 17:17)  T(F): 98 (07 Jan 2024 05:50), Max: 98.2 (06 Jan 2024 17:17)  HR: 65 (07 Jan 2024 06:56) (48 - 124)  BP: 158/72 (07 Jan 2024 06:56) (158/72 - 199/81)  BP(mean): --  RR: 17 (07 Jan 2024 06:56) (16 - 20)  SpO2: 98% (07 Jan 2024 06:56) (98% - 99%)    Parameters below as of 07 Jan 2024 06:56  Patient On (Oxygen Delivery Method): room air        I&O's Summary      On Exam:  TELE: SR runs -130  Constitutional: NAD, awake and alert, well-developed  HEENT: Moist Mucous Membranes, Anicteric  Pulmonary: Decreased breath sounds b/l. No rales, crackles or wheeze appreciated.   Cardiovascular: tachy, S1 and S2, + SM   Gastrointestinal: Bowel Sounds present, soft, nontender.   Lymph: No peripheral edema. No lymphadenopathy.  Skin: No visible rashes or ulcers.  Psych:  Mood & affect appropriate    LABS: All Labs Reviewed:                        12.9   9.95  )-----------( 237      ( 07 Jan 2024 05:35 )             38.1                         13.1   7.43  )-----------( 251      ( 06 Jan 2024 18:11 )             39.6     07 Jan 2024 05:35    136    |  103    |  17     ----------------------------<  92     3.8     |  26     |  0.77   06 Jan 2024 18:11    133    |  102    |  26     ----------------------------<  119    4.1     |  27     |  0.92     Ca    8.6        07 Jan 2024 05:35  Ca    9.0        06 Jan 2024 18:11  Mg     2.2       06 Jan 2024 18:11    TPro  7.8    /  Alb  3.7    /  TBili  0.3    /  DBili  x      /  AST  20     /  ALT  18     /  AlkPhos  94     06 Jan 2024 18:11    PT/INR - ( 06 Jan 2024 18:11 )   PT: 10.8 sec;   INR: 0.92 ratio         PTT - ( 06 Jan 2024 18:11 )  PTT:27.0 sec  CARDIAC MARKERS ( 06 Jan 2024 18:11 )  x     / x     / 91 U/L / x     / 1.9 ng/mL    - Troponin: <-18.9, <-9.4  Blood Culture:     01-06 @ 18:11  TSH: 1.77        RADIOLOGY:

## 2024-01-07 NOTE — PROGRESS NOTE ADULT - PROBLEM SELECTOR PLAN 1
present with  intermittent palpitations  -CTA: Negative for PE, negative for lung consolidation  -EKG: NSR @ 117bpm  -No CP, SOB associated   - RVP negative   -Patient currently in and out of tachycardia/bradycardia (123bpm/59bpm)  -Monitor on Telemetry  -Monitor routine hemodynamics  -Cardiology consult noted   -changed to sotalol

## 2024-01-07 NOTE — CONSULT NOTE ADULT - ASSESSMENT
89 yo female w/ pmh of Atrial Tachycardia, Htn, Hld, Hypothyrodism, anxiety presents with 3 days of intermittent palpitations.    - pt with runs of PSVT. Appears to be having symptomatic more frequent PAT.   - noted to have SB in 50s on cardizem and toprol at baseline  - would dc cardizem and toprol and start on sotalol  - EKG with baseline    - sotalol 80mg q12. check EKG ken qtc 2 hours post each sotalol dose.   - Apparently had dizziness with Amio    - No signs of significant ischemia or volume overload.   - EKG nonischemic  - Appears compensated from HF POV.     - cont bp control.   - cont lisionpril  - on clonidine which may be contributing to babar.   - if necessary add hyralazine or low dose norvasc    - cont statin  - Monitor and replete electrolytes. Keep K>4.0 and Mg>2.0.  - Further cardiac workup will depend on clinical course.   - All other workup per primary team. Will followup.

## 2024-01-07 NOTE — CARE COORDINATION ASSESSMENT. - NSPASTMEDSURGHISTORY_GEN_ALL_CORE_FT
PAST MEDICAL & SURGICAL HISTORY:  Hypercholesteremia      HTN - Hypertension      S/P Cataract Surgery  B/L      Liver cyst  removed      Atrial tachycardia      Hypothyroid

## 2024-01-07 NOTE — CARE COORDINATION ASSESSMENT. - OTHER PERTINENT REFERRAL INFORMATION
CM met with patient and daughter Delphine at bedside to explain role and transitions of care. Patient lives with daughter in a private house with 3 steps to get in and 12 to the second floor with banisters.  Patient was fully independent prior to admission. Daughter is the caregiver.  No home care prior to admission. Patient uses a walker for ambulation and owns a cane. Daughter will transport patient home when ready to be discharge.  No needs identified at this moment.  CM explained  home care expectation, process, insurance provision and home safety with good understanding.  Patient and daughter verbalized understanding of plans after discharge and are in agreement.  All questions answered to the best of my abilities.  CM remains available throughout the hospital stay.

## 2024-01-07 NOTE — PROGRESS NOTE ADULT - ASSESSMENT
89 yo female w/ pmh of Atrial Tachycardia, Htn, Hld, Hypothyrodism, Anxiety admitted for palpitations. 89 yo female w/ pmh of Atrial Tachycardia, Htn, Hld, Hypothyrodism, Anxiety admitted for palpitations.

## 2024-01-08 PROCEDURE — 99233 SBSQ HOSP IP/OBS HIGH 50: CPT

## 2024-01-08 RX ORDER — SODIUM CHLORIDE 9 MG/ML
500 INJECTION INTRAMUSCULAR; INTRAVENOUS; SUBCUTANEOUS ONCE
Refills: 0 | Status: COMPLETED | OUTPATIENT
Start: 2024-01-08 | End: 2024-01-08

## 2024-01-08 RX ADMIN — ENOXAPARIN SODIUM 30 MILLIGRAM(S): 100 INJECTION SUBCUTANEOUS at 06:23

## 2024-01-08 RX ADMIN — Medication 650 MILLIGRAM(S): at 10:39

## 2024-01-08 RX ADMIN — Medication 80 MILLIGRAM(S): at 21:52

## 2024-01-08 RX ADMIN — Medication 0.1 MILLIGRAM(S): at 06:24

## 2024-01-08 RX ADMIN — Medication 0.1 MILLIGRAM(S): at 18:09

## 2024-01-08 RX ADMIN — Medication 1 TABLET(S): at 11:29

## 2024-01-08 RX ADMIN — LISINOPRIL 20 MILLIGRAM(S): 2.5 TABLET ORAL at 06:24

## 2024-01-08 RX ADMIN — Medication 25 MICROGRAM(S): at 06:24

## 2024-01-08 RX ADMIN — SODIUM CHLORIDE 1500 MILLILITER(S): 9 INJECTION INTRAMUSCULAR; INTRAVENOUS; SUBCUTANEOUS at 08:20

## 2024-01-08 RX ADMIN — Medication 10 MILLIGRAM(S): at 06:24

## 2024-01-08 NOTE — PROGRESS NOTE ADULT - ASSESSMENT
89 yo female w/ pmh of Atrial Tachycardia, Htn, Hld, Hypothyrodism, anxiety presents with 3 days of intermittent palpitations.    Palpitations  - pt with runs of PSVT. Appears to be having symptomatic more frequent PAT.   - noted to have SB in 50s on cardizem and toprol at baseline  -  cardizem and toprol dc'ed.   - started on sotalol  - AM dose held in the setting of hypotension. QTC 485ms  - EKG with baseline    - Apparently had dizziness with Amio  - continue statin    - No signs of significant ischemia or volume overload.   - EKG nonischemic  - Appears compensated from HF POV.     - Bps are labile. pt was hypotensive and symptomatic in AM. BP now at baseline  - continue lisinopril with hold parameters  -  clonidine may be contributing to bradycardia   - check orthostatic vitals when able     - Monitor and replete lytes, keep K>4, Mg>2.  - Will continue to follow.    Marti Jones NP  Nurse Practitioner- Cardiology   Call TEAMS 89 yo female w/ pmh of Atrial Tachycardia, Htn, Hld, Hypothyrodism, anxiety presents with 3 days of intermittent palpitations.    Palpitations  - pt with runs of PSVT. Appears to be having symptomatic more frequent PAT.   - noted to have SB in 50s on cardizem and toprol at baseline  -  cardizem and toprol dc'ed.   - started on sotalol  - AM dose held in the setting of hypotension. QTC 485ms  - EKG with baseline    - Apparently had dizziness with Amio  - continue statin    - No signs of significant ischemia or volume overload.   - EKG nonischemic  - Appears compensated from HF POV.     - Bps are labile. pt was hypotensive and symptomatic in AM. BP now at baseline  - continue lisinopril with hold parameters  -  clonidine may be contributing to bradycardia   - check orthostatic vitals when able     - Monitor and replete lytes, keep K>4, Mg>2.  - Will continue to follow.    Marti Jones NP  Nurse Practitioner- Cardiology   Call TEAMS 89 yo female w/ pmh of Atrial Tachycardia, Htn, Hld, Hypothyrodism, anxiety presents with 3 days of intermittent palpitations.    Palpitations  - pt with runs of PSVT. Appears to be having symptomatic more frequent PAT.   - noted to have SB in 50s on cardizem and toprol at baseline  -  cardizem and toprol dc'ed.   - started on sotalol  - AM dose held in the setting of hypotension. QTC 485ms  - EKG with baseline    - Apparently had dizziness with Amio  - continue statin    - No signs of significant ischemia or volume overload.   - EKG nonischemic  - Appears compensated from HF POV.   - TTE pending    - Bps are labile. pt was hypotensive and symptomatic in AM. BP now at baseline  - continue lisinopril with hold parameters  -  clonidine may be contributing to bradycardia   - check orthostatic vitals when able     - Monitor and replete lytes, keep K>4, Mg>2.  - Will continue to follow.    Marti Jones NP  Nurse Practitioner- Cardiology   Call TEAMS 89 yo female w/ pmh of Atrial Tachycardia, Htn, Hld, Hypothyrodism, anxiety presents with 3 days of intermittent palpitations.    Palpitations  - pt with runs of PSVT. Appears to be having symptomatic more frequent PAT.   - noted to have SB in 50s on cardizem and toprol at baseline  -  cardizem and toprol dc'ed.   - started on sotalol  - AM dose held in the setting of hypotension. QTC 485ms  - EKG with baseline    - Apparently had dizziness with Amio  - continue statin    - No signs of significant ischemia or volume overload.   - EKG nonischemic  - Appears compensated from HF POV.   - TTE pending    - Bps are labile. pt was hypotensive and symptomatic in AM. BP now at baseline  - continue lisinopril with hold parameters  -  clonidine may be contributing to bradycardia   - check orthostatic vitals when able     - Monitor and replete lytes, keep K>4, Mg>2.  - Will continue to follow.    Marti Jones NP  Nurse Practitioner- Cardiology   Call TEAMS

## 2024-01-08 NOTE — PROGRESS NOTE ADULT - PROBLEM SELECTOR PLAN 3
/ 79 on admission  c/w lisinopril, sotalol, clonidine   -Monitor routine hemodynamics  hypotensive after one IV hydralazine in am , d/c hydralazine

## 2024-01-08 NOTE — PATIENT PROFILE ADULT - FUNCTIONAL ASSESSMENT - BASIC MOBILITY 6.
4-calculated by average/Not able to assess (calculate score using [facility] averaging method)  4-calculated by average/Not able to assess (calculate score using [facility] averaging method)

## 2024-01-08 NOTE — PATIENT PROFILE ADULT - FALL HARM RISK - HARM RISK INTERVENTIONS
Assistance OOB with selected safe patient handling equipment/Communicate Risk of Fall with Harm to all staff/Discuss with provider need for PT consult/Monitor gait and stability/Provide patient with walking aids - walker, cane, crutches/Reinforce activity limits and safety measures with patient and family/Tailored Fall Risk Interventions/Visual Cue: Yellow wristband and red socks/Bed in lowest position, wheels locked, appropriate side rails in place/Call bell, personal items and telephone in reach/Instruct patient to call for assistance before getting out of bed or chair/Non-slip footwear when patient is out of bed/[place] to call system/Physically safe environment - no spills, clutter or unnecessary equipment/Purposeful Proactive Rounding/Room/bathroom lighting operational, light cord in reach Assistance OOB with selected safe patient handling equipment/Communicate Risk of Fall with Harm to all staff/Discuss with provider need for PT consult/Monitor gait and stability/Provide patient with walking aids - walker, cane, crutches/Reinforce activity limits and safety measures with patient and family/Tailored Fall Risk Interventions/Visual Cue: Yellow wristband and red socks/Bed in lowest position, wheels locked, appropriate side rails in place/Call bell, personal items and telephone in reach/Instruct patient to call for assistance before getting out of bed or chair/Non-slip footwear when patient is out of bed/[place] to call system/Physically safe environment - no spills, clutter or unnecessary equipment/Purposeful Proactive Rounding/Room/bathroom lighting operational, light cord in reach

## 2024-01-08 NOTE — ED ADULT NURSE REASSESSMENT NOTE - NS ED NURSE REASSESS COMMENT FT1
Pt awake and alert now, NAD, awaiting IP bed, no questions or issues at this time. Meds ordered, will continue to monitor.
Pt received from ( Doe   ) nurse at this time. Pt A&Ox4 on room air. No complaints of pain or discomfort. No acute distress. Vitals remain stable. Tele monitor in place, visualized on monitor with tech. Oriented to room. Call bell within reach. Pt resting comfortably, will continue to monitor.
Received pt from change of shift to room 14A, pt is A+Ox3, calm and cooperative, able to move all extremities. C/o palpitations. Unlabored breathing at this time. No distress noted. MD evaluation in progress.
Spoke with MD Serna in regards to holding diltiazem and metoprolol 6am dose due to fluctuation in HR 60s-130s. As per MD Serna, hold both medications for now, hydralazine IV instead.
patient resting comfortably in stretcher eating meal. family at bedside. denies any complaints at this time. HR in 50s on bedside monitor and sustaining. normotensive. care ongoing
pt resting comfortably in bed, denies complaints at this time. updated VS as noted in flowsheet. respirations even and unlabored. pt in NAD. will continue to monitor.
pt resting comfortably in bed. vs as noted in flowsheet. pt hypotensive at this time. MD Mahendra Greene made aware of situation, awaiting medication orders. denies dizziness, lightheadedness, headache, n/v. respirations even and unlabored. will continue to monitor.
Pt observed sleeping in bed comfortably, NAD, VS stable w/ episodes of A-Tach. Will continue to monitor

## 2024-01-08 NOTE — PROGRESS NOTE ADULT - PROBLEM SELECTOR PLAN 1
present with  intermittent palpitations  -CTA: Negative for PE, negative for lung consolidation  -EKG: NSR @ 117bpm  -No CP, SOB associated   - RVP negative   -Patient currently in and out of tachycardia/bradycardia (123bpm/59bpm)  -Monitor on Telemetry  -Monitor routine hemodynamics  -Cardiology consult noted   -changed to sotalol  HR controlled  echo pending

## 2024-01-08 NOTE — PROGRESS NOTE ADULT - ASSESSMENT
89 yo female w/ pmh of Atrial Tachycardia, Htn, Hld, Hypothyrodism, Anxiety admitted for palpitations. 89 yo female w/ pmh of Atrial Tachycardia, Htn, Hld, Hypothyrodism, Anxiety admitted for palpitations. no

## 2024-01-08 NOTE — PROGRESS NOTE ADULT - SUBJECTIVE AND OBJECTIVE BOX
[facility] -- Nitza Lozano,  Hugo, Adan Gao Savella, Goodger, Cohen  Office # [phone]    Follow Up:  palpitations    Subjective/Observations: Pt seen and examined comfortably in bed.  Reports feeling" off", ? lightheaded.  Was hypotensive  86/43. HR 50s,  sotalol am dose was held   No complaints of chest pain, dyspnea, or palpitations reported. No signs of orthopnea or PND.      REVIEW OF SYSTEMS: All other review of systems is negative unless indicated above  PAST MEDICAL & SURGICAL HISTORY:  HTN - Hypertension      Hypercholesteremia      Atrial tachycardia      Hypothyroid      S/P Cataract Surgery  B/L      Liver cyst  removed        MEDICATIONS  (STANDING):  cloNIDine 0.1 milliGRAM(s) Oral two times a day  enoxaparin Injectable 30 milliGRAM(s) SubCutaneous every 24 hours  levothyroxine 25 MICROGram(s) Oral daily  lisinopril 20 milliGRAM(s) Oral daily  multivitamin/minerals 1 Tablet(s) Oral daily  simvastatin 10 milliGRAM(s) Oral at bedtime  sotalol. 80 milliGRAM(s) Oral every 12 hours    MEDICATIONS  (PRN):  acetaminophen     Tablet .. 650 milliGRAM(s) Oral every 6 hours PRN Temp greater or equal to 38C (100.4F), Mild Pain (1 - 3)  melatonin 3 milliGRAM(s) Oral at bedtime PRN Insomnia    Allergies    penicillins (Unknown)    Intolerances      Vital Signs Last 24 Hrs  T(C): 36.7 (08 Jan 2024 08:34), Max: 36.9 (07 Jan 2024 19:30)  T(F): 98.1 (08 Jan 2024 08:34), Max: 98.4 (07 Jan 2024 19:30)  HR: 55 (08 Jan 2024 08:34) (52 - 104)  BP: 107/64 (08 Jan 2024 08:34) (75/41 - 146/71)  BP(mean): 53 (08 Jan 2024 07:41) (53 - 53)  RR: 18 (08 Jan 2024 08:34) (18 - 20)  SpO2: 96% (08 Jan 2024 08:34) (95% - 97%)    Parameters below as of 08 Jan 2024 08:34  Patient On (Oxygen Delivery Method): room air      I&O's Summary      TELE: SB 50s  PHYSICAL EXAM:  Constitutional: NAD, awake and alert  HEENT: Moist Mucous Membranes, Anicteric  Pulmonary: Non-labored, breath sounds are clear bilaterally, No wheezing, rales or rhonchi  Cardiovascular: babar, Regular, S1 and S2,+ murmur. no rubs, gallops or clicks  Gastrointestinal: Bowel Sounds present, soft, nontender.   Lymph: No peripheral edema. No lymphadenopathy.  Skin: No visible rashes or ulcers.  Psych:  Mood & affect appropriate  LABS: All Labs Reviewed:                        12.9   9.95  )-----------( 237      ( 07 Jan 2024 05:35 )             38.1                         13.1   7.43  )-----------( 251      ( 06 Jan 2024 18:11 )             39.6     07 Jan 2024 05:35    136    |  103    |  17     ----------------------------<  92     3.8     |  26     |  0.77   06 Jan 2024 18:11    133    |  102    |  26     ----------------------------<  119    4.1     |  27     |  0.92     Ca    8.6        07 Jan 2024 05:35  Ca    9.0        06 Jan 2024 18:11  Mg     2.2       06 Jan 2024 18:11    TPro  7.8    /  Alb  3.7    /  TBili  0.3    /  DBili  x      /  AST  20     /  ALT  18     /  AlkPhos  94     06 Jan 2024 18:11    PT/INR - ( 06 Jan 2024 18:11 )   PT: 10.8 sec;   INR: 0.92 ratio         PTT - ( 06 Jan 2024 18:11 )  PTT:27.0 sec  CARDIAC MARKERS ( 06 Jan 2024 18:11 )  x     / x     / 91 U/L / x     / 1.9 ng/mL      12 Lead ECG:   Ventricular Rate 52 BPM    Atrial Rate 52 BPM    P-R Interval 174 ms    QRS Duration 90 ms    Q-T Interval 522 ms    QTC Calculation(Bazett) 485 ms    P Axis 41 degrees    R Axis -63 degrees    T Axis 19 degrees    Diagnosis Line Sinus bradycardia  Left anterior fascicular block  Abnormal ECG  When compared with ECG of 06-JAN-2024 17:42, (Unconfirmed)  T wave inversion now evident in Inferior leads  T wave amplitude has decreased in Anterior leads  QT has lengthened  Confirmed by ANIVAL LOPEZ ([id number]) on 1/7/2024 5:35:59 PM (01-07-24 @ 10:25)      Patient name: ROBERT BELLA  YOB: 1929   Age: 84 (F)   MR#: [id number]  Study Date: 4/2/2014  Location: O/PSonographer: eJsica Benjamin  Study quality: Technically good  Referring Physician: Chavo Whitlock MD  ------------------------------------------------------------------------  Blood Pressure: 163/80 mmHg  Height: 5ft 0in  Weight: 134 lb  BSA: 1.6 m2  ------------------------------------------------------------------------  PROCEDURE: Transthoracic echocardiogram with 2-D, M-Mode  and complete spectral and color flow Doppler.  INDICATION: Abnormal Echocardiogram/EKG (794.31)  ------------------------------------------------------------------------  DIMENSIONS:  Dimensions:     Normal Values:  LA:     2.6 cm    2.0 - 4.0 cm  Ao:     2.8 cm    2.0 - 3.8 cm  SEPTUM: 0.6 cm    0.6 - 1.2 cm  PWT:    0.6 cm    0.6 - 1.1 cm  LVIDd:  3.9 cm    3.0 - 5.6 cm  LVIDs:  1.8 cm    1.8 - 4.0 cm  Derived Variables:  LVMI: 39 g/m2  RWT: 0.30  Fractional short: 54 %  Ejection Fraction: 85 %  ------------------------------------------------------------------------  OBSERVATIONS:  Mitral Valve: Normal mitral valve.  Aortic Root: Normal aortic root.  Aortic Valve: Normal trileaflet aortic valve.  Left Atrium: Normal left atrium  Left Ventricle: Normal left ventricular internal dimensions  and wall thicknesses.  Hyperdynamic left ventricle.  Right Heart: Normal right atrium.  Normal right ventricular size and function.  Normal tricuspid valve. Moderate tricuspid regurgitation.  Normal pulmonic valve.  Estimated pulmonary artery systolic pressure equals 43 mm  Hg, assuming right atrial pressure equals 10  mm Hg,  consistent with mild pulmonary hypertension.  Pericardium/PleuraNormal pericardium with no pericardial  effusion.  Large cystic structure noted within the liver of unclear  etiology. Recommend dedicated sonography to evaluate.  ------------------------------------------------------------------------  CONCLUSIONS:  1. Normal left ventricular internal dimensions and wall  thicknesses.  2. Hyperdynamic left ventricle.  ------------------------------------------------------------------------  Confirmed on  4/2/2014 - 14:17:06 by Jesus Crain M.D.  ------------------------------------------------------------------------      [facility] -- Nitza Lozano,  Hugo, Adan Gao Savella, Goodger, Cohen  Office # [phone]    Follow Up:  palpitations    Subjective/Observations: Pt seen and examined comfortably in bed.  Reports feeling" off", ? lightheaded.  Was hypotensive  86/43. HR 50s,  sotalol am dose was held   No complaints of chest pain, dyspnea, or palpitations reported. No signs of orthopnea or PND.      REVIEW OF SYSTEMS: All other review of systems is negative unless indicated above  PAST MEDICAL & SURGICAL HISTORY:  HTN - Hypertension      Hypercholesteremia      Atrial tachycardia      Hypothyroid      S/P Cataract Surgery  B/L      Liver cyst  removed        MEDICATIONS  (STANDING):  cloNIDine 0.1 milliGRAM(s) Oral two times a day  enoxaparin Injectable 30 milliGRAM(s) SubCutaneous every 24 hours  levothyroxine 25 MICROGram(s) Oral daily  lisinopril 20 milliGRAM(s) Oral daily  multivitamin/minerals 1 Tablet(s) Oral daily  simvastatin 10 milliGRAM(s) Oral at bedtime  sotalol. 80 milliGRAM(s) Oral every 12 hours    MEDICATIONS  (PRN):  acetaminophen     Tablet .. 650 milliGRAM(s) Oral every 6 hours PRN Temp greater or equal to 38C (100.4F), Mild Pain (1 - 3)  melatonin 3 milliGRAM(s) Oral at bedtime PRN Insomnia    Allergies    penicillins (Unknown)    Intolerances      Vital Signs Last 24 Hrs  T(C): 36.7 (08 Jan 2024 08:34), Max: 36.9 (07 Jan 2024 19:30)  T(F): 98.1 (08 Jan 2024 08:34), Max: 98.4 (07 Jan 2024 19:30)  HR: 55 (08 Jan 2024 08:34) (52 - 104)  BP: 107/64 (08 Jan 2024 08:34) (75/41 - 146/71)  BP(mean): 53 (08 Jan 2024 07:41) (53 - 53)  RR: 18 (08 Jan 2024 08:34) (18 - 20)  SpO2: 96% (08 Jan 2024 08:34) (95% - 97%)    Parameters below as of 08 Jan 2024 08:34  Patient On (Oxygen Delivery Method): room air      I&O's Summary      TELE: SB 50s  PHYSICAL EXAM:  Constitutional: NAD, awake and alert  HEENT: Moist Mucous Membranes, Anicteric  Pulmonary: Non-labored, breath sounds are clear bilaterally, No wheezing, rales or rhonchi  Cardiovascular: babar, Regular, S1 and S2,+ murmur. no rubs, gallops or clicks  Gastrointestinal: Bowel Sounds present, soft, nontender.   Lymph: No peripheral edema. No lymphadenopathy.  Skin: No visible rashes or ulcers.  Psych:  Mood & affect appropriate  LABS: All Labs Reviewed:                        12.9   9.95  )-----------( 237      ( 07 Jan 2024 05:35 )             38.1                         13.1   7.43  )-----------( 251      ( 06 Jan 2024 18:11 )             39.6     07 Jan 2024 05:35    136    |  103    |  17     ----------------------------<  92     3.8     |  26     |  0.77   06 Jan 2024 18:11    133    |  102    |  26     ----------------------------<  119    4.1     |  27     |  0.92     Ca    8.6        07 Jan 2024 05:35  Ca    9.0        06 Jan 2024 18:11  Mg     2.2       06 Jan 2024 18:11    TPro  7.8    /  Alb  3.7    /  TBili  0.3    /  DBili  x      /  AST  20     /  ALT  18     /  AlkPhos  94     06 Jan 2024 18:11    PT/INR - ( 06 Jan 2024 18:11 )   PT: 10.8 sec;   INR: 0.92 ratio         PTT - ( 06 Jan 2024 18:11 )  PTT:27.0 sec  CARDIAC MARKERS ( 06 Jan 2024 18:11 )  x     / x     / 91 U/L / x     / 1.9 ng/mL      12 Lead ECG:   Ventricular Rate 52 BPM    Atrial Rate 52 BPM    P-R Interval 174 ms    QRS Duration 90 ms    Q-T Interval 522 ms    QTC Calculation(Bazett) 485 ms    P Axis 41 degrees    R Axis -63 degrees    T Axis 19 degrees    Diagnosis Line Sinus bradycardia  Left anterior fascicular block  Abnormal ECG  When compared with ECG of 06-JAN-2024 17:42, (Unconfirmed)  T wave inversion now evident in Inferior leads  T wave amplitude has decreased in Anterior leads  QT has lengthened  Confirmed by ANIVAL LOPEZ ([id number]) on 1/7/2024 5:35:59 PM (01-07-24 @ 10:25)      Patient name: ROBERT BELLA  YOB: 1929   Age: 84 (F)   MR#: [id number]  Study Date: 4/2/2014  Location: O/PSonographer: Jesica Benjamin  Study quality: Technically good  Referring Physician: Chavo Whitlock MD  ------------------------------------------------------------------------  Blood Pressure: 163/80 mmHg  Height: 5ft 0in  Weight: 134 lb  BSA: 1.6 m2  ------------------------------------------------------------------------  PROCEDURE: Transthoracic echocardiogram with 2-D, M-Mode  and complete spectral and color flow Doppler.  INDICATION: Abnormal Echocardiogram/EKG (794.31)  ------------------------------------------------------------------------  DIMENSIONS:  Dimensions:     Normal Values:  LA:     2.6 cm    2.0 - 4.0 cm  Ao:     2.8 cm    2.0 - 3.8 cm  SEPTUM: 0.6 cm    0.6 - 1.2 cm  PWT:    0.6 cm    0.6 - 1.1 cm  LVIDd:  3.9 cm    3.0 - 5.6 cm  LVIDs:  1.8 cm    1.8 - 4.0 cm  Derived Variables:  LVMI: 39 g/m2  RWT: 0.30  Fractional short: 54 %  Ejection Fraction: 85 %  ------------------------------------------------------------------------  OBSERVATIONS:  Mitral Valve: Normal mitral valve.  Aortic Root: Normal aortic root.  Aortic Valve: Normal trileaflet aortic valve.  Left Atrium: Normal left atrium  Left Ventricle: Normal left ventricular internal dimensions  and wall thicknesses.  Hyperdynamic left ventricle.  Right Heart: Normal right atrium.  Normal right ventricular size and function.  Normal tricuspid valve. Moderate tricuspid regurgitation.  Normal pulmonic valve.  Estimated pulmonary artery systolic pressure equals 43 mm  Hg, assuming right atrial pressure equals 10  mm Hg,  consistent with mild pulmonary hypertension.  Pericardium/PleuraNormal pericardium with no pericardial  effusion.  Large cystic structure noted within the liver of unclear  etiology. Recommend dedicated sonography to evaluate.  ------------------------------------------------------------------------  CONCLUSIONS:  1. Normal left ventricular internal dimensions and wall  thicknesses.  2. Hyperdynamic left ventricle.  ------------------------------------------------------------------------  Confirmed on  4/2/2014 - 14:17:06 by Jesus Crain M.D.  ------------------------------------------------------------------------      [facility] -- Nitza Lozano,  Hugo, Adan Gao, Mario Joe Cohen  Office # [phone]    Follow Up:  palpitations    Subjective/Observations: Pt seen and examined comfortably in bed.  Reports feeling" off", ? lightheaded.  Was hypotensive  86/43 ( per pt and tele screen). HR 50s,  sotalol am dose was held   No complaints of chest pain, dyspnea, or palpitations reported. No signs of orthopnea or PND.      REVIEW OF SYSTEMS: All other review of systems is negative unless indicated above  PAST MEDICAL & SURGICAL HISTORY:  HTN - Hypertension      Hypercholesteremia      Atrial tachycardia      Hypothyroid      S/P Cataract Surgery  B/L      Liver cyst  removed        MEDICATIONS  (STANDING):  cloNIDine 0.1 milliGRAM(s) Oral two times a day  enoxaparin Injectable 30 milliGRAM(s) SubCutaneous every 24 hours  levothyroxine 25 MICROGram(s) Oral daily  lisinopril 20 milliGRAM(s) Oral daily  multivitamin/minerals 1 Tablet(s) Oral daily  simvastatin 10 milliGRAM(s) Oral at bedtime  sotalol. 80 milliGRAM(s) Oral every 12 hours    MEDICATIONS  (PRN):  acetaminophen     Tablet .. 650 milliGRAM(s) Oral every 6 hours PRN Temp greater or equal to 38C (100.4F), Mild Pain (1 - 3)  melatonin 3 milliGRAM(s) Oral at bedtime PRN Insomnia    Allergies    penicillins (Unknown)    Intolerances      Vital Signs Last 24 Hrs  T(C): 36.7 (08 Jan 2024 08:34), Max: 36.9 (07 Jan 2024 19:30)  T(F): 98.1 (08 Jan 2024 08:34), Max: 98.4 (07 Jan 2024 19:30)  HR: 55 (08 Jan 2024 08:34) (52 - 104)  BP: 107/64 (08 Jan 2024 08:34) (75/41 - 146/71)  BP(mean): 53 (08 Jan 2024 07:41) (53 - 53)  RR: 18 (08 Jan 2024 08:34) (18 - 20)  SpO2: 96% (08 Jan 2024 08:34) (95% - 97%)    Parameters below as of 08 Jan 2024 08:34  Patient On (Oxygen Delivery Method): room air      I&O's Summary      TELE: SB 50s  PHYSICAL EXAM:  Constitutional: NAD, awake and alert  HEENT: Moist Mucous Membranes, Anicteric  Pulmonary: Non-labored, breath sounds are clear bilaterally, No wheezing, rales or rhonchi  Cardiovascular: babar, Regular, S1 and S2,+ murmur. no rubs, gallops or clicks  Gastrointestinal: Bowel Sounds present, soft, nontender.   Lymph: No peripheral edema. No lymphadenopathy.  Skin: No visible rashes or ulcers.  Psych:  Mood & affect appropriate  LABS: All Labs Reviewed:                        12.9   9.95  )-----------( 237      ( 07 Jan 2024 05:35 )             38.1                         13.1   7.43  )-----------( 251      ( 06 Jan 2024 18:11 )             39.6     07 Jan 2024 05:35    136    |  103    |  17     ----------------------------<  92     3.8     |  26     |  0.77   06 Jan 2024 18:11    133    |  102    |  26     ----------------------------<  119    4.1     |  27     |  0.92     Ca    8.6        07 Jan 2024 05:35  Ca    9.0        06 Jan 2024 18:11  Mg     2.2       06 Jan 2024 18:11    TPro  7.8    /  Alb  3.7    /  TBili  0.3    /  DBili  x      /  AST  20     /  ALT  18     /  AlkPhos  94     06 Jan 2024 18:11    PT/INR - ( 06 Jan 2024 18:11 )   PT: 10.8 sec;   INR: 0.92 ratio         PTT - ( 06 Jan 2024 18:11 )  PTT:27.0 sec  CARDIAC MARKERS ( 06 Jan 2024 18:11 )  x     / x     / 91 U/L / x     / 1.9 ng/mL      12 Lead ECG:   Ventricular Rate 52 BPM    Atrial Rate 52 BPM    P-R Interval 174 ms    QRS Duration 90 ms    Q-T Interval 522 ms    QTC Calculation(Bazett) 485 ms    P Axis 41 degrees    R Axis -63 degrees    T Axis 19 degrees    Diagnosis Line Sinus bradycardia  Left anterior fascicular block  Abnormal ECG  When compared with ECG of 06-JAN-2024 17:42, (Unconfirmed)  T wave inversion now evident in Inferior leads  T wave amplitude has decreased in Anterior leads  QT has lengthened  Confirmed by ANIVAL LOPEZ ([id number]) on 1/7/2024 5:35:59 PM (01-07-24 @ 10:25)      Patient name: ROBERT BELLA  YOB: 1929   Age: 84 (F)   MR#: [id number]  Study Date: 4/2/2014  Location: O/PSonographer: Jesica Benjamin  Study quality: Technically good  Referring Physician: Chavo Whitlock MD  ------------------------------------------------------------------------  Blood Pressure: 163/80 mmHg  Height: 5ft 0in  Weight: 134 lb  BSA: 1.6 m2  ------------------------------------------------------------------------  PROCEDURE: Transthoracic echocardiogram with 2-D, M-Mode  and complete spectral and color flow Doppler.  INDICATION: Abnormal Echocardiogram/EKG (794.31)  ------------------------------------------------------------------------  DIMENSIONS:  Dimensions:     Normal Values:  LA:     2.6 cm    2.0 - 4.0 cm  Ao:     2.8 cm    2.0 - 3.8 cm  SEPTUM: 0.6 cm    0.6 - 1.2 cm  PWT:    0.6 cm    0.6 - 1.1 cm  LVIDd:  3.9 cm    3.0 - 5.6 cm  LVIDs:  1.8 cm    1.8 - 4.0 cm  Derived Variables:  LVMI: 39 g/m2  RWT: 0.30  Fractional short: 54 %  Ejection Fraction: 85 %  ------------------------------------------------------------------------  OBSERVATIONS:  Mitral Valve: Normal mitral valve.  Aortic Root: Normal aortic root.  Aortic Valve: Normal trileaflet aortic valve.  Left Atrium: Normal left atrium  Left Ventricle: Normal left ventricular internal dimensions  and wall thicknesses.  Hyperdynamic left ventricle.  Right Heart: Normal right atrium.  Normal right ventricular size and function.  Normal tricuspid valve. Moderate tricuspid regurgitation.  Normal pulmonic valve.  Estimated pulmonary artery systolic pressure equals 43 mm  Hg, assuming right atrial pressure equals 10  mm Hg,  consistent with mild pulmonary hypertension.  Pericardium/PleuraNormal pericardium with no pericardial  effusion.  Large cystic structure noted within the liver of unclear  etiology. Recommend dedicated sonography to evaluate.  ------------------------------------------------------------------------  CONCLUSIONS:  1. Normal left ventricular internal dimensions and wall  thicknesses.  2. Hyperdynamic left ventricle.  ------------------------------------------------------------------------  Confirmed on  4/2/2014 - 14:17:06 by Jesus Crain M.D.  ------------------------------------------------------------------------      [facility] -- Nitza Lozano,  Hugo, Adan Gao, Mario Joe Cohen  Office # [phone]    Follow Up:  palpitations    Subjective/Observations: Pt seen and examined comfortably in bed.  Reports feeling" off", ? lightheaded.  Was hypotensive  86/43 ( per pt and tele screen). HR 50s,  sotalol am dose was held   No complaints of chest pain, dyspnea, or palpitations reported. No signs of orthopnea or PND.      REVIEW OF SYSTEMS: All other review of systems is negative unless indicated above  PAST MEDICAL & SURGICAL HISTORY:  HTN - Hypertension      Hypercholesteremia      Atrial tachycardia      Hypothyroid      S/P Cataract Surgery  B/L      Liver cyst  removed        MEDICATIONS  (STANDING):  cloNIDine 0.1 milliGRAM(s) Oral two times a day  enoxaparin Injectable 30 milliGRAM(s) SubCutaneous every 24 hours  levothyroxine 25 MICROGram(s) Oral daily  lisinopril 20 milliGRAM(s) Oral daily  multivitamin/minerals 1 Tablet(s) Oral daily  simvastatin 10 milliGRAM(s) Oral at bedtime  sotalol. 80 milliGRAM(s) Oral every 12 hours    MEDICATIONS  (PRN):  acetaminophen     Tablet .. 650 milliGRAM(s) Oral every 6 hours PRN Temp greater or equal to 38C (100.4F), Mild Pain (1 - 3)  melatonin 3 milliGRAM(s) Oral at bedtime PRN Insomnia    Allergies    penicillins (Unknown)    Intolerances      Vital Signs Last 24 Hrs  T(C): 36.7 (08 Jan 2024 08:34), Max: 36.9 (07 Jan 2024 19:30)  T(F): 98.1 (08 Jan 2024 08:34), Max: 98.4 (07 Jan 2024 19:30)  HR: 55 (08 Jan 2024 08:34) (52 - 104)  BP: 107/64 (08 Jan 2024 08:34) (75/41 - 146/71)  BP(mean): 53 (08 Jan 2024 07:41) (53 - 53)  RR: 18 (08 Jan 2024 08:34) (18 - 20)  SpO2: 96% (08 Jan 2024 08:34) (95% - 97%)    Parameters below as of 08 Jan 2024 08:34  Patient On (Oxygen Delivery Method): room air      I&O's Summary      TELE: SB 50s  PHYSICAL EXAM:  Constitutional: NAD, awake and alert  HEENT: Moist Mucous Membranes, Anicteric  Pulmonary: Non-labored, breath sounds are clear bilaterally, No wheezing, rales or rhonchi  Cardiovascular: babar, Regular, S1 and S2,+ murmur. no rubs, gallops or clicks  Gastrointestinal: Bowel Sounds present, soft, nontender.   Lymph: No peripheral edema. No lymphadenopathy.  Skin: No visible rashes or ulcers.  Psych:  Mood & affect appropriate  LABS: All Labs Reviewed:                        12.9   9.95  )-----------( 237      ( 07 Jan 2024 05:35 )             38.1                         13.1   7.43  )-----------( 251      ( 06 Jan 2024 18:11 )             39.6     07 Jan 2024 05:35    136    |  103    |  17     ----------------------------<  92     3.8     |  26     |  0.77   06 Jan 2024 18:11    133    |  102    |  26     ----------------------------<  119    4.1     |  27     |  0.92     Ca    8.6        07 Jan 2024 05:35  Ca    9.0        06 Jan 2024 18:11  Mg     2.2       06 Jan 2024 18:11    TPro  7.8    /  Alb  3.7    /  TBili  0.3    /  DBili  x      /  AST  20     /  ALT  18     /  AlkPhos  94     06 Jan 2024 18:11    PT/INR - ( 06 Jan 2024 18:11 )   PT: 10.8 sec;   INR: 0.92 ratio         PTT - ( 06 Jan 2024 18:11 )  PTT:27.0 sec  CARDIAC MARKERS ( 06 Jan 2024 18:11 )  x     / x     / 91 U/L / x     / 1.9 ng/mL      12 Lead ECG:   Ventricular Rate 52 BPM    Atrial Rate 52 BPM    P-R Interval 174 ms    QRS Duration 90 ms    Q-T Interval 522 ms    QTC Calculation(Bazett) 485 ms    P Axis 41 degrees    R Axis -63 degrees    T Axis 19 degrees    Diagnosis Line Sinus bradycardia  Left anterior fascicular block  Abnormal ECG  When compared with ECG of 06-JAN-2024 17:42, (Unconfirmed)  T wave inversion now evident in Inferior leads  T wave amplitude has decreased in Anterior leads  QT has lengthened  Confirmed by ANIVAL LOPEZ ([id number]) on 1/7/2024 5:35:59 PM (01-07-24 @ 10:25)      Patient name: ROBERT BELLA  YOB: 1929   Age: 84 (F)   MR#: [id number]  Study Date: 4/2/2014  Location: O/PSonographer: Jesica Benjamin  Study quality: Technically good  Referring Physician: Chavo Whitlock MD  ------------------------------------------------------------------------  Blood Pressure: 163/80 mmHg  Height: 5ft 0in  Weight: 134 lb  BSA: 1.6 m2  ------------------------------------------------------------------------  PROCEDURE: Transthoracic echocardiogram with 2-D, M-Mode  and complete spectral and color flow Doppler.  INDICATION: Abnormal Echocardiogram/EKG (794.31)  ------------------------------------------------------------------------  DIMENSIONS:  Dimensions:     Normal Values:  LA:     2.6 cm    2.0 - 4.0 cm  Ao:     2.8 cm    2.0 - 3.8 cm  SEPTUM: 0.6 cm    0.6 - 1.2 cm  PWT:    0.6 cm    0.6 - 1.1 cm  LVIDd:  3.9 cm    3.0 - 5.6 cm  LVIDs:  1.8 cm    1.8 - 4.0 cm  Derived Variables:  LVMI: 39 g/m2  RWT: 0.30  Fractional short: 54 %  Ejection Fraction: 85 %  ------------------------------------------------------------------------  OBSERVATIONS:  Mitral Valve: Normal mitral valve.  Aortic Root: Normal aortic root.  Aortic Valve: Normal trileaflet aortic valve.  Left Atrium: Normal left atrium  Left Ventricle: Normal left ventricular internal dimensions  and wall thicknesses.  Hyperdynamic left ventricle.  Right Heart: Normal right atrium.  Normal right ventricular size and function.  Normal tricuspid valve. Moderate tricuspid regurgitation.  Normal pulmonic valve.  Estimated pulmonary artery systolic pressure equals 43 mm  Hg, assuming right atrial pressure equals 10  mm Hg,  consistent with mild pulmonary hypertension.  Pericardium/PleuraNormal pericardium with no pericardial  effusion.  Large cystic structure noted within the liver of unclear  etiology. Recommend dedicated sonography to evaluate.  ------------------------------------------------------------------------  CONCLUSIONS:  1. Normal left ventricular internal dimensions and wall  thicknesses.  2. Hyperdynamic left ventricle.  ------------------------------------------------------------------------  Confirmed on  4/2/2014 - 14:17:06 by Jesus Crain M.D.  ------------------------------------------------------------------------

## 2024-01-08 NOTE — PROGRESS NOTE ADULT - NS ATTEND AMEND GEN_ALL_CORE FT
89 yo female w/ pmh of Atrial Tachycardia, Htn, Hld, Hypothyrodism, anxiety presents with 3 days of intermittent palpitations.    - pt with runs of PSVT. Appears to be having symptomatic more frequent PAT.   - noted to have SB in 50s on cardizem and toprol at baseline  -  cardizem and toprol dc'ed.   - started on sotalol  - AM dose held in the setting of hypotension. QTC 485ms  - EKG with baseline    - d/c lisinopril to allow room to administer sotalol  - Continue to check EKGs two hours post each dose of sotalol  - Apparently had dizziness with Amio  - continue statin  - TTE pending  -  clonidine may be contributing to bradycardia   - check orthostatic vitals when able

## 2024-01-08 NOTE — PROGRESS NOTE ADULT - SUBJECTIVE AND OBJECTIVE BOX
Patient is a 89y old  Female who presents with a chief complaint of Palpitations (08 Jan 2024 12:50)      Subjective:  INTERVAL HPI/OVERNIGHT EVENTS: Patient seen and examined at bedside.  Patient states she is feeling tired    MEDICATIONS  (STANDING):  cloNIDine 0.1 milliGRAM(s) Oral two times a day  enoxaparin Injectable 30 milliGRAM(s) SubCutaneous every 24 hours  levothyroxine 25 MICROGram(s) Oral daily  multivitamin/minerals 1 Tablet(s) Oral daily  simvastatin 10 milliGRAM(s) Oral at bedtime  sotalol. 80 milliGRAM(s) Oral every 12 hours    MEDICATIONS  (PRN):  acetaminophen     Tablet .. 650 milliGRAM(s) Oral every 6 hours PRN Temp greater or equal to 38C (100.4F), Mild Pain (1 - 3)  melatonin 3 milliGRAM(s) Oral at bedtime PRN Insomnia      Allergies    penicillins (Unknown)    Intolerances        REVIEW OF SYSTEMS:  CONSTITUTIONAL: No fever or chills  HEENT:  No headache, no sore throat  RESPIRATORY: No cough or shortness of breath  CARDIOVASCULAR: No chest pain or palpitations  GASTROINTESTINAL: No abd pain, nausea, vomiting, or diarrhea      Objective:  Vital Signs Last 24 Hrs  T(C): 36.7 (08 Jan 2024 15:35), Max: 36.9 (07 Jan 2024 19:30)  T(F): 98.1 (08 Jan 2024 15:35), Max: 98.4 (07 Jan 2024 19:30)  HR: 59 (08 Jan 2024 15:35) (55 - 104)  BP: 123/61 (08 Jan 2024 15:35) (75/41 - 146/71)  BP(mean): 53 (08 Jan 2024 07:41) (53 - 53)  RR: 18 (08 Jan 2024 15:35) (18 - 20)  SpO2: 97% (08 Jan 2024 15:35) (95% - 97%)    Parameters below as of 08 Jan 2024 15:35  Patient On (Oxygen Delivery Method): room air        GENERAL: NAD, lying in bed comfortably  HEAD:  Normocephalic  EYES:  conjunctiva and sclera clear  ENT: Moist mucous membranes  NECK: Supple  CHEST/LUNG: Clear to auscultation bilaterally; No rales or rhonchi; no wheezing. Unlabored respirations  HEART: Regular rate and rhythm; S1S2+  ABDOMEN: Bowel sounds present; Soft, Nontender, Nondistended.   EXTREMITIES:  + distal Peripheral Pulses;  No cyanosis, or edema  NERVOUS SYSTEM:  Alert & Oriented X3;  No gross focal deficits   MSK: moves all extremities  SKIN: No rashes     LABS:      Ca    8.6        07 Jan 2024 05:35      PT/INR - ( 06 Jan 2024 18:11 )   PT: 10.8 sec;   INR: 0.92 ratio         PTT - ( 06 Jan 2024 18:11 )  PTT:27.0 sec  Urinalysis Basic - ( 07 Jan 2024 05:35 )    Color: x / Appearance: x / SG: x / pH: x  Gluc: 92 mg/dL / Ketone: x  / Bili: x / Urobili: x   Blood: x / Protein: x / Nitrite: x   Leuk Esterase: x / RBC: x / WBC x   Sq Epi: x / Non Sq Epi: x / Bacteria: x      CAPILLARY BLOOD GLUCOSE            Culture - Urine (collected 01-06-24 @ 18:59)  Source: Clean Catch Clean Catch (Midstream)  Preliminary Report (01-08-24 @ 11:02):    10,000 - 49,000 CFU/mL Gram positive organisms    <10,000 CFU/ml Normal Urogenital gaurav present        RADIOLOGY & ADDITIONAL TESTS:    Personally reviewed.     Consultant(s) Notes Reviewed:  [x] YES  [ ] NO    Plan of care discussed with patient /family Son on the phone ; all questions answered

## 2024-01-09 LAB
ANION GAP SERPL CALC-SCNC: 3 MMOL/L — LOW (ref 5–17)
ANION GAP SERPL CALC-SCNC: 3 MMOL/L — LOW (ref 5–17)
BASOPHILS # BLD AUTO: 0.02 K/UL — SIGNIFICANT CHANGE UP (ref 0–0.2)
BASOPHILS # BLD AUTO: 0.02 K/UL — SIGNIFICANT CHANGE UP (ref 0–0.2)
BASOPHILS NFR BLD AUTO: 0.2 % — SIGNIFICANT CHANGE UP (ref 0–2)
BASOPHILS NFR BLD AUTO: 0.2 % — SIGNIFICANT CHANGE UP (ref 0–2)
BUN SERPL-MCNC: 25 MG/DL — HIGH (ref 7–23)
BUN SERPL-MCNC: 25 MG/DL — HIGH (ref 7–23)
CALCIUM SERPL-MCNC: 8.2 MG/DL — LOW (ref 8.5–10.1)
CALCIUM SERPL-MCNC: 8.2 MG/DL — LOW (ref 8.5–10.1)
CHLORIDE SERPL-SCNC: 103 MMOL/L — SIGNIFICANT CHANGE UP (ref 96–108)
CHLORIDE SERPL-SCNC: 103 MMOL/L — SIGNIFICANT CHANGE UP (ref 96–108)
CO2 SERPL-SCNC: 26 MMOL/L — SIGNIFICANT CHANGE UP (ref 22–31)
CO2 SERPL-SCNC: 26 MMOL/L — SIGNIFICANT CHANGE UP (ref 22–31)
CREAT SERPL-MCNC: 0.85 MG/DL — SIGNIFICANT CHANGE UP (ref 0.5–1.3)
CREAT SERPL-MCNC: 0.85 MG/DL — SIGNIFICANT CHANGE UP (ref 0.5–1.3)
CULTURE RESULTS: SIGNIFICANT CHANGE UP
CULTURE RESULTS: SIGNIFICANT CHANGE UP
EGFR: 63 ML/MIN/1.73M2 — SIGNIFICANT CHANGE UP
EGFR: 63 ML/MIN/1.73M2 — SIGNIFICANT CHANGE UP
EOSINOPHIL # BLD AUTO: 0.24 K/UL — SIGNIFICANT CHANGE UP (ref 0–0.5)
EOSINOPHIL # BLD AUTO: 0.24 K/UL — SIGNIFICANT CHANGE UP (ref 0–0.5)
EOSINOPHIL NFR BLD AUTO: 3 % — SIGNIFICANT CHANGE UP (ref 0–6)
EOSINOPHIL NFR BLD AUTO: 3 % — SIGNIFICANT CHANGE UP (ref 0–6)
GLUCOSE SERPL-MCNC: 103 MG/DL — HIGH (ref 70–99)
GLUCOSE SERPL-MCNC: 103 MG/DL — HIGH (ref 70–99)
HCT VFR BLD CALC: 35.1 % — SIGNIFICANT CHANGE UP (ref 34.5–45)
HCT VFR BLD CALC: 35.1 % — SIGNIFICANT CHANGE UP (ref 34.5–45)
HGB BLD-MCNC: 11.9 G/DL — SIGNIFICANT CHANGE UP (ref 11.5–15.5)
HGB BLD-MCNC: 11.9 G/DL — SIGNIFICANT CHANGE UP (ref 11.5–15.5)
IMM GRANULOCYTES NFR BLD AUTO: 0.7 % — SIGNIFICANT CHANGE UP (ref 0–0.9)
IMM GRANULOCYTES NFR BLD AUTO: 0.7 % — SIGNIFICANT CHANGE UP (ref 0–0.9)
LYMPHOCYTES # BLD AUTO: 0.75 K/UL — LOW (ref 1–3.3)
LYMPHOCYTES # BLD AUTO: 0.75 K/UL — LOW (ref 1–3.3)
LYMPHOCYTES # BLD AUTO: 9.3 % — LOW (ref 13–44)
LYMPHOCYTES # BLD AUTO: 9.3 % — LOW (ref 13–44)
MCHC RBC-ENTMCNC: 29.8 PG — SIGNIFICANT CHANGE UP (ref 27–34)
MCHC RBC-ENTMCNC: 29.8 PG — SIGNIFICANT CHANGE UP (ref 27–34)
MCHC RBC-ENTMCNC: 33.9 GM/DL — SIGNIFICANT CHANGE UP (ref 32–36)
MCHC RBC-ENTMCNC: 33.9 GM/DL — SIGNIFICANT CHANGE UP (ref 32–36)
MCV RBC AUTO: 87.8 FL — SIGNIFICANT CHANGE UP (ref 80–100)
MCV RBC AUTO: 87.8 FL — SIGNIFICANT CHANGE UP (ref 80–100)
MONOCYTES # BLD AUTO: 1.35 K/UL — HIGH (ref 0–0.9)
MONOCYTES # BLD AUTO: 1.35 K/UL — HIGH (ref 0–0.9)
MONOCYTES NFR BLD AUTO: 16.8 % — HIGH (ref 2–14)
MONOCYTES NFR BLD AUTO: 16.8 % — HIGH (ref 2–14)
NEUTROPHILS # BLD AUTO: 5.61 K/UL — SIGNIFICANT CHANGE UP (ref 1.8–7.4)
NEUTROPHILS # BLD AUTO: 5.61 K/UL — SIGNIFICANT CHANGE UP (ref 1.8–7.4)
NEUTROPHILS NFR BLD AUTO: 70 % — SIGNIFICANT CHANGE UP (ref 43–77)
NEUTROPHILS NFR BLD AUTO: 70 % — SIGNIFICANT CHANGE UP (ref 43–77)
NRBC # BLD: 0 /100 WBCS — SIGNIFICANT CHANGE UP (ref 0–0)
NRBC # BLD: 0 /100 WBCS — SIGNIFICANT CHANGE UP (ref 0–0)
PLATELET # BLD AUTO: 221 K/UL — SIGNIFICANT CHANGE UP (ref 150–400)
PLATELET # BLD AUTO: 221 K/UL — SIGNIFICANT CHANGE UP (ref 150–400)
POTASSIUM SERPL-MCNC: 4.2 MMOL/L — SIGNIFICANT CHANGE UP (ref 3.5–5.3)
POTASSIUM SERPL-MCNC: 4.2 MMOL/L — SIGNIFICANT CHANGE UP (ref 3.5–5.3)
POTASSIUM SERPL-SCNC: 4.2 MMOL/L — SIGNIFICANT CHANGE UP (ref 3.5–5.3)
POTASSIUM SERPL-SCNC: 4.2 MMOL/L — SIGNIFICANT CHANGE UP (ref 3.5–5.3)
RBC # BLD: 4 M/UL — SIGNIFICANT CHANGE UP (ref 3.8–5.2)
RBC # BLD: 4 M/UL — SIGNIFICANT CHANGE UP (ref 3.8–5.2)
RBC # FLD: 14.1 % — SIGNIFICANT CHANGE UP (ref 10.3–14.5)
RBC # FLD: 14.1 % — SIGNIFICANT CHANGE UP (ref 10.3–14.5)
SODIUM SERPL-SCNC: 132 MMOL/L — LOW (ref 135–145)
SODIUM SERPL-SCNC: 132 MMOL/L — LOW (ref 135–145)
SPECIMEN SOURCE: SIGNIFICANT CHANGE UP
SPECIMEN SOURCE: SIGNIFICANT CHANGE UP
WBC # BLD: 8.03 K/UL — SIGNIFICANT CHANGE UP (ref 3.8–10.5)
WBC # BLD: 8.03 K/UL — SIGNIFICANT CHANGE UP (ref 3.8–10.5)
WBC # FLD AUTO: 8.03 K/UL — SIGNIFICANT CHANGE UP (ref 3.8–10.5)
WBC # FLD AUTO: 8.03 K/UL — SIGNIFICANT CHANGE UP (ref 3.8–10.5)

## 2024-01-09 PROCEDURE — 99233 SBSQ HOSP IP/OBS HIGH 50: CPT

## 2024-01-09 PROCEDURE — 93010 ELECTROCARDIOGRAM REPORT: CPT

## 2024-01-09 RX ORDER — SODIUM CHLORIDE 9 MG/ML
1 INJECTION INTRAMUSCULAR; INTRAVENOUS; SUBCUTANEOUS
Refills: 0 | Status: COMPLETED | OUTPATIENT
Start: 2024-01-09 | End: 2024-01-10

## 2024-01-09 RX ORDER — POLYETHYLENE GLYCOL 3350 17 G/17G
17 POWDER, FOR SOLUTION ORAL DAILY
Refills: 0 | Status: DISCONTINUED | OUTPATIENT
Start: 2024-01-09 | End: 2024-01-13

## 2024-01-09 RX ADMIN — Medication 0.1 MILLIGRAM(S): at 05:15

## 2024-01-09 RX ADMIN — ENOXAPARIN SODIUM 30 MILLIGRAM(S): 100 INJECTION SUBCUTANEOUS at 05:15

## 2024-01-09 RX ADMIN — Medication 25 MICROGRAM(S): at 05:15

## 2024-01-09 RX ADMIN — Medication 80 MILLIGRAM(S): at 09:19

## 2024-01-09 RX ADMIN — Medication 0.1 MILLIGRAM(S): at 17:20

## 2024-01-09 RX ADMIN — SIMVASTATIN 10 MILLIGRAM(S): 20 TABLET, FILM COATED ORAL at 21:26

## 2024-01-09 RX ADMIN — Medication 80 MILLIGRAM(S): at 21:26

## 2024-01-09 RX ADMIN — Medication 1 TABLET(S): at 11:25

## 2024-01-09 RX ADMIN — SODIUM CHLORIDE 1 GRAM(S): 9 INJECTION INTRAMUSCULAR; INTRAVENOUS; SUBCUTANEOUS at 17:20

## 2024-01-09 NOTE — PROGRESS NOTE ADULT - NS ATTEND AMEND GEN_ALL_CORE FT
89 yo female w/ pmh of Atrial Tachycardia, Htn, Hld, Hypothyrodism, anxiety presents with 3 days of intermittent palpitations.    - pt with runs of PSVT. Appears to be having symptomatic more frequent PAT.   - noted to have SB in 50s on cardizem and toprol at baseline  -  cardizem and toprol dc'ed.   - started on sotalol  - AM dose held in the setting of hypotension. QTC 485ms on 1/8. Given 1/8 pm and this am. Needs stat EKG to assess QTC.   - d/c lisinopril to allow room to administer sotalol  - Continue to check EKGs two hours post each dose of sotalol  - Apparently had dizziness with Amio  - continue statin  - TTE pending  -  clonidine may be contributing to bradycardia

## 2024-01-09 NOTE — PROGRESS NOTE ADULT - PROBLEM SELECTOR PLAN 1
present with  intermittent palpitations  -CTA: Negative for PE, negative for lung consolidation  -EKG: NSR @ 117bpm  -No CP, SOB associated   - RVP negative   -Monitor on Telemetry stable   -Cardiology consult noted   -changed to sotalol  HR controlled  echo pending

## 2024-01-09 NOTE — PROGRESS NOTE ADULT - ASSESSMENT
89 yo female w/ pmh of Atrial Tachycardia, Htn, Hld, Hypothyrodism, anxiety presents with 3 days of intermittent palpitations.    Palpitations  - pt with runs of PSVT. Appears to be having symptomatic more frequent PAT.   - noted to have SB in 40s - 60's on tele overnight   - home cardizem and toprol dc'ed.   - started on sotalol, held AM dose (1/8/2024) 2/2  hypotension QTC 485ms  - EKG with baseline    - f/u EKG Daily to eval QTC, no EKG since 1/7/2024  - Apparently had dizziness with Amio  - continue statin    - No signs of significant ischemia or volume overload.   - EKG nonischemic  - Appears compensated from HF POV.   - TTE pending    - BP stable and controlled   - continue lisinopril with hold parameters  - clonidine may be contributing to bradycardia   - check orthostatic vitals when able     - Monitor and replete Lytes. Keep K > 4 and Mg > 2  - Will continue to follow.    EVGENY Montez  Nurse Practitioner - Cardiology   call TEAMS     89 yo female w/ pmh of Atrial Tachycardia, Htn, Hld, Hypothyrodism, anxiety presents with 3 days of intermittent palpitations.    Palpitations  - pt with runs of PSVT. Appears to be having symptomatic more frequent PAT.   - noted to have SB in 40s - 60's on tele overnight   - home cardizem and toprol dc'ed.   - started on sotalol, held AM dose (1/8/2024) 2/2  hypotension QTC 485ms  - EKG with baseline    - f/u EKG Daily to eval QTC, no EKG since 1/7/2024  - Apparently had dizziness with Amio  - continue statin    - No signs of significant ischemia or volume overload.   - EKG nonischemic  - Appears compensated from HF POV.   - TTE pending    - BP stable and controlled   - continue lisinopril with hold parameters  - clonidine may be contributing to bradycardia   - check orthostatic vitals when able     - Monitor and replete Lytes. Keep K > 4 and Mg > 2  - Will continue to follow.    EVGENY Montez  Nurse Practitioner - Cardiology   call TEAMS     89 yo female w/ pmh of Atrial Tachycardia, Htn, Hld, Hypothyrodism, anxiety presents with 3 days of intermittent palpitations.    Palpitations  - pt with runs of PSVT. Appears to be having symptomatic more frequent PAT.   - noted to have SB in 40s - 60's on tele overnight   - home cardizem and toprol dc'ed.   - started on sotalol, held AM dose (1/8/2024) 2/2  hypotension but got it this am  - EKG with baseline    - f/u EKG Daily to eval QTC, no EKG since 1/7/2024  - Apparently had dizziness with Amio  - continue statin    - No signs of significant ischemia or volume overload.   - EKG nonischemic  - Appears compensated from HF POV.   - TTE pending    - BP stable and controlled   - continue lisinopril with hold parameters  - clonidine may be contributing to bradycardia   - check orthostatic vitals when able     - Monitor and replete Lytes. Keep K > 4 and Mg > 2  - Will continue to follow.    EVGENY Montez  Nurse Practitioner - Cardiology   call TEAMS

## 2024-01-09 NOTE — PROGRESS NOTE ADULT - SUBJECTIVE AND OBJECTIVE BOX
[facility] -- Hugo Goddard Pannella, Patel, Savella, Goodger, Cohen  Office # [phone]    Follow Up:  palpitations     Subjective/Observations: seen and examined, awake, alert,sitting comfortably in the chair, denies chest pain, dyspnea, palpitations or dizziness, orthopnea and PND. Tolerating room air. no events overnight     REVIEW OF SYSTEMS: All other review of systems is negative unless indicated above  PAST MEDICAL & SURGICAL HISTORY:  HTN - Hypertension      Hypercholesteremia      Atrial tachycardia      Hypothyroid      S/P Cataract Surgery  B/L      Liver cyst  removed        MEDICATIONS  (STANDING):  cloNIDine 0.1 milliGRAM(s) Oral two times a day  enoxaparin Injectable 30 milliGRAM(s) SubCutaneous every 24 hours  levothyroxine 25 MICROGram(s) Oral daily  multivitamin/minerals 1 Tablet(s) Oral daily  polyethylene glycol 3350 17 Gram(s) Oral daily  simvastatin 10 milliGRAM(s) Oral at bedtime  sotalol. 80 milliGRAM(s) Oral every 12 hours    MEDICATIONS  (PRN):  acetaminophen     Tablet .. 650 milliGRAM(s) Oral every 6 hours PRN Temp greater or equal to 38C (100.4F), Mild Pain (1 - 3)  melatonin 3 milliGRAM(s) Oral at bedtime PRN Insomnia    Allergies    penicillins (Unknown)    Intolerances      Vital Signs Last 24 Hrs  T(C): 36.7 (09 Jan 2024 09:16), Max: 36.8 (08 Jan 2024 18:05)  T(F): 98 (09 Jan 2024 09:16), Max: 98.3 (08 Jan 2024 18:05)  HR: 100 (09 Jan 2024 09:16) (49 - 100)  BP: 144/82 (09 Jan 2024 09:16) (123/61 - 165/83)  BP(mean): --  RR: 17 (09 Jan 2024 09:16) (17 - 18)  SpO2: 96% (09 Jan 2024 09:16) (95% - 97%)    Parameters below as of 09 Jan 2024 09:16  Patient On (Oxygen Delivery Method): room air      I&O's Summary    08 Jan 2024 07:01  -  09 Jan 2024 07:00  --------------------------------------------------------  IN: 0 mL / OUT: 750 mL / NET: -750 mL          TELE: SR/ SB 40-80's   PHYSICAL EXAM:  Constitutional: NAD, awake and alert  HEENT: Moist Mucous Membranes, Anicteric  Pulmonary: Non-labored, breath sounds are clear bilaterally, No wheezing, rales or rhonchi  Cardiovascular: Regular, S1 and S2, No murmurs, rubs, gallops or clicks  Gastrointestinal: Bowel Sounds present, soft, nontender.   Lymph: No peripheral edema. No lymphadenopathy.  Skin: No visible rashes or ulcers.  Psych:  Mood & affect appropriate  LABS: All Labs Reviewed:                        11.9   8.03  )-----------( 221      ( 09 Jan 2024 06:32 )             35.1                         12.9   9.95  )-----------( 237      ( 07 Jan 2024 05:35 )             38.1                         13.1   7.43  )-----------( 251      ( 06 Jan 2024 18:11 )             39.6     09 Jan 2024 06:32    132    |  103    |  25     ----------------------------<  103    4.2     |  26     |  0.85   07 Jan 2024 05:35    136    |  103    |  17     ----------------------------<  92     3.8     |  26     |  0.77   06 Jan 2024 18:11    133    |  102    |  26     ----------------------------<  119    4.1     |  27     |  0.92     Ca    8.2        09 Jan 2024 06:32  Ca    8.6        07 Jan 2024 05:35  Ca    9.0        06 Jan 2024 18:11  Mg     2.2       06 Jan 2024 18:11    TPro  7.8    /  Alb  3.7    /  TBili  0.3    /  DBili  x      /  AST  20     /  ALT  18     /  AlkPhos  94     06 Jan 2024 18:11          12 Lead ECG:   Ventricular Rate 52 BPM    Atrial Rate 52 BPM    P-R Interval 174 ms    QRS Duration 90 ms    Q-T Interval 522 ms    QTC Calculation(Bazett) 485 ms    P Axis 41 degrees    R Axis -63 degrees    T Axis 19 degrees    Diagnosis Line Sinus bradycardia  Left anterior fascicular block  Abnormal ECG  When compared with ECG of 06-JAN-2024 17:42, (Unconfirmed)  T wave inversion now evident in Inferior leads  T wave amplitude has decreased in Anterior leads  QT has lengthened  Confirmed by ANIVAL LOPEZ ([id number]) on 1/7/2024 5:35:59 PM (01-07-24 @ 10:25)      Patient name: ROBERT BELLA  YOB: 1929   Age: 84 (F)   MR#: [id number]  Study Date: 4/2/2014  Location: O/PSonographer: Jesica Benjamin  Study quality: Technically good  Referring Physician: Chavo Whitlock MD  ------------------------------------------------------------------------  Blood Pressure: 163/80 mmHg  Height: 5ft 0in  Weight: 134 lb  BSA: 1.6 m2  ------------------------------------------------------------------------  PROCEDURE: Transthoracic echocardiogram with 2-D, M-Mode  and complete spectral and color flow Doppler.  INDICATION: Abnormal Echocardiogram/EKG (794.31)  ------------------------------------------------------------------------  DIMENSIONS:  Dimensions:     Normal Values:  LA:     2.6 cm    2.0 - 4.0 cm  Ao:     2.8 cm    2.0 - 3.8 cm  SEPTUM: 0.6 cm    0.6 - 1.2 cm  PWT:    0.6 cm    0.6 - 1.1 cm  LVIDd:  3.9 cm    3.0 - 5.6 cm  LVIDs:  1.8 cm    1.8 - 4.0 cm  Derived Variables:  LVMI: 39 g/m2  RWT: 0.30  Fractional short: 54 %  Ejection Fraction: 85 %  ------------------------------------------------------------------------  OBSERVATIONS:  Mitral Valve: Normal mitral valve.  Aortic Root: Normal aortic root.  Aortic Valve: Normal trileaflet aortic valve.  Left Atrium: Normal left atrium  Left Ventricle: Normal left ventricular internal dimensions  and wall thicknesses.  Hyperdynamic left ventricle.  Right Heart: Normal right atrium.  Normal right ventricular size and function.  Normal tricuspid valve. Moderate tricuspid regurgitation.  Normal pulmonic valve.  Estimated pulmonary artery systolic pressure equals 43 mm  Hg, assuming right atrial pressure equals 10  mm Hg,  consistent with mild pulmonary hypertension.  Pericardium/PleuraNormal pericardium with no pericardial  effusion.  Large cystic structure noted within the liver of unclear  etiology. Recommend dedicated sonography to evaluate.  ------------------------------------------------------------------------  CONCLUSIONS:  1. Normal left ventricular internal dimensions and wall  thicknesses.  2. Hyperdynamic left ventricle.  ------------------------------------------------------------------------  Confirmed on  4/2/2014 - 14:17:06 by Jesus Crain M.D.  ------------------------------------------------------------------------      [facility] -- Hugo Goddard Pannella, Patel, Savella, Goodger, Cohen  Office # [phone]    Follow Up:  palpitations     Subjective/Observations: seen and examined, awake, alert,sitting comfortably in the chair, denies chest pain, dyspnea, palpitations or dizziness, orthopnea and PND. Tolerating room air. no events overnight     REVIEW OF SYSTEMS: All other review of systems is negative unless indicated above  PAST MEDICAL & SURGICAL HISTORY:  HTN - Hypertension      Hypercholesteremia      Atrial tachycardia      Hypothyroid      S/P Cataract Surgery  B/L      Liver cyst  removed        MEDICATIONS  (STANDING):  cloNIDine 0.1 milliGRAM(s) Oral two times a day  enoxaparin Injectable 30 milliGRAM(s) SubCutaneous every 24 hours  levothyroxine 25 MICROGram(s) Oral daily  multivitamin/minerals 1 Tablet(s) Oral daily  polyethylene glycol 3350 17 Gram(s) Oral daily  simvastatin 10 milliGRAM(s) Oral at bedtime  sotalol. 80 milliGRAM(s) Oral every 12 hours    MEDICATIONS  (PRN):  acetaminophen     Tablet .. 650 milliGRAM(s) Oral every 6 hours PRN Temp greater or equal to 38C (100.4F), Mild Pain (1 - 3)  melatonin 3 milliGRAM(s) Oral at bedtime PRN Insomnia    Allergies    penicillins (Unknown)    Intolerances      Vital Signs Last 24 Hrs  T(C): 36.7 (09 Jan 2024 09:16), Max: 36.8 (08 Jan 2024 18:05)  T(F): 98 (09 Jan 2024 09:16), Max: 98.3 (08 Jan 2024 18:05)  HR: 100 (09 Jan 2024 09:16) (49 - 100)  BP: 144/82 (09 Jan 2024 09:16) (123/61 - 165/83)  BP(mean): --  RR: 17 (09 Jan 2024 09:16) (17 - 18)  SpO2: 96% (09 Jan 2024 09:16) (95% - 97%)    Parameters below as of 09 Jan 2024 09:16  Patient On (Oxygen Delivery Method): room air      I&O's Summary    08 Jan 2024 07:01  -  09 Jan 2024 07:00  --------------------------------------------------------  IN: 0 mL / OUT: 750 mL / NET: -750 mL          TELE: SR/ SB 40-80's   PHYSICAL EXAM:  Constitutional: NAD, awake and alert  HEENT: Moist Mucous Membranes, Anicteric  Pulmonary: Non-labored, breath sounds are clear bilaterally, No wheezing, rales or rhonchi  Cardiovascular: Regular, S1 and S2, No murmurs, rubs, gallops or clicks  Gastrointestinal: Bowel Sounds present, soft, nontender.   Lymph: No peripheral edema. No lymphadenopathy.  Skin: No visible rashes or ulcers.  Psych:  Mood & affect appropriate  LABS: All Labs Reviewed:                        11.9   8.03  )-----------( 221      ( 09 Jan 2024 06:32 )             35.1                         12.9   9.95  )-----------( 237      ( 07 Jan 2024 05:35 )             38.1                         13.1   7.43  )-----------( 251      ( 06 Jan 2024 18:11 )             39.6     09 Jan 2024 06:32    132    |  103    |  25     ----------------------------<  103    4.2     |  26     |  0.85   07 Jan 2024 05:35    136    |  103    |  17     ----------------------------<  92     3.8     |  26     |  0.77   06 Jan 2024 18:11    133    |  102    |  26     ----------------------------<  119    4.1     |  27     |  0.92     Ca    8.2        09 Jan 2024 06:32  Ca    8.6        07 Jan 2024 05:35  Ca    9.0        06 Jan 2024 18:11  Mg     2.2       06 Jan 2024 18:11    TPro  7.8    /  Alb  3.7    /  TBili  0.3    /  DBili  x      /  AST  20     /  ALT  18     /  AlkPhos  94     06 Jan 2024 18:11          12 Lead ECG:   Ventricular Rate 52 BPM    Atrial Rate 52 BPM    P-R Interval 174 ms    QRS Duration 90 ms    Q-T Interval 522 ms    QTC Calculation(Bazett) 485 ms    P Axis 41 degrees    R Axis -63 degrees    T Axis 19 degrees    Diagnosis Line Sinus bradycardia  Left anterior fascicular block  Abnormal ECG  When compared with ECG of 06-JAN-2024 17:42, (Unconfirmed)  T wave inversion now evident in Inferior leads  T wave amplitude has decreased in Anterior leads  QT has lengthened  Confirmed by ANIVAL LOPEZ ([id number]) on 1/7/2024 5:35:59 PM (01-07-24 @ 10:25)      Patient name: ROBERT BELLA  YOB: 1929   Age: 84 (F)   MR#: [id number]  Study Date: 4/2/2014  Location: O/PSonographer: Jesica Benjamin  Study quality: Technically good  Referring Physician: Chavo Whitlock MD  ------------------------------------------------------------------------  Blood Pressure: 163/80 mmHg  Height: 5ft 0in  Weight: 134 lb  BSA: 1.6 m2  ------------------------------------------------------------------------  PROCEDURE: Transthoracic echocardiogram with 2-D, M-Mode  and complete spectral and color flow Doppler.  INDICATION: Abnormal Echocardiogram/EKG (794.31)  ------------------------------------------------------------------------  DIMENSIONS:  Dimensions:     Normal Values:  LA:     2.6 cm    2.0 - 4.0 cm  Ao:     2.8 cm    2.0 - 3.8 cm  SEPTUM: 0.6 cm    0.6 - 1.2 cm  PWT:    0.6 cm    0.6 - 1.1 cm  LVIDd:  3.9 cm    3.0 - 5.6 cm  LVIDs:  1.8 cm    1.8 - 4.0 cm  Derived Variables:  LVMI: 39 g/m2  RWT: 0.30  Fractional short: 54 %  Ejection Fraction: 85 %  ------------------------------------------------------------------------  OBSERVATIONS:  Mitral Valve: Normal mitral valve.  Aortic Root: Normal aortic root.  Aortic Valve: Normal trileaflet aortic valve.  Left Atrium: Normal left atrium  Left Ventricle: Normal left ventricular internal dimensions  and wall thicknesses.  Hyperdynamic left ventricle.  Right Heart: Normal right atrium.  Normal right ventricular size and function.  Normal tricuspid valve. Moderate tricuspid regurgitation.  Normal pulmonic valve.  Estimated pulmonary artery systolic pressure equals 43 mm  Hg, assuming right atrial pressure equals 10  mm Hg,  consistent with mild pulmonary hypertension.  Pericardium/PleuraNormal pericardium with no pericardial  effusion.  Large cystic structure noted within the liver of unclear  etiology. Recommend dedicated sonography to evaluate.  ------------------------------------------------------------------------  CONCLUSIONS:  1. Normal left ventricular internal dimensions and wall  thicknesses.  2. Hyperdynamic left ventricle.  ------------------------------------------------------------------------  Confirmed on  4/2/2014 - 14:17:06 by Jesus Crain M.D.  ------------------------------------------------------------------------

## 2024-01-09 NOTE — PROGRESS NOTE ADULT - ASSESSMENT
89 yo female w/ pmh of Atrial Tachycardia, Htn, Hld, Hypothyrodism, Anxiety admitted for palpitations. 89 yo female w/ pmh of Atrial Tachycardia, Htn, Hld, Hypothyrodism, Anxiety admitted for palpitations.

## 2024-01-09 NOTE — PROGRESS NOTE ADULT - SUBJECTIVE AND OBJECTIVE BOX
Patient is a 89y old  Female who presents with a chief complaint of Palpitations (09 Jan 2024 11:24)      Subjective:  INTERVAL HPI/OVERNIGHT EVENTS: Patient seen and examined at bedside.  Patient c/o constipation.   MEDICATIONS  (STANDING):  cloNIDine 0.1 milliGRAM(s) Oral two times a day  enoxaparin Injectable 30 milliGRAM(s) SubCutaneous every 24 hours  levothyroxine 25 MICROGram(s) Oral daily  multivitamin/minerals 1 Tablet(s) Oral daily  polyethylene glycol 3350 17 Gram(s) Oral daily  simvastatin 10 milliGRAM(s) Oral at bedtime  sodium chloride 1 Gram(s) Oral two times a day  sotalol. 80 milliGRAM(s) Oral every 12 hours    MEDICATIONS  (PRN):  acetaminophen     Tablet .. 650 milliGRAM(s) Oral every 6 hours PRN Temp greater or equal to 38C (100.4F), Mild Pain (1 - 3)  melatonin 3 milliGRAM(s) Oral at bedtime PRN Insomnia      Allergies    penicillins (Unknown)    Intolerances        REVIEW OF SYSTEMS:  CONSTITUTIONAL: No fever or chills  HEENT:  No headache, no sore throat  RESPIRATORY: No cough or shortness of breath  CARDIOVASCULAR: No chest pain or palpitations  GASTROINTESTINAL: No abd pain, nausea, vomiting, or diarrhea      Objective:  Vital Signs Last 24 Hrs  T(C): 36.4 (09 Jan 2024 11:51), Max: 36.8 (08 Jan 2024 18:05)  T(F): 97.6 (09 Jan 2024 11:51), Max: 98.3 (08 Jan 2024 18:05)  HR: 88 (09 Jan 2024 11:51) (49 - 100)  BP: 155/71 (09 Jan 2024 11:51) (123/61 - 165/83)  BP(mean): --  RR: 17 (09 Jan 2024 11:51) (17 - 18)  SpO2: 96% (09 Jan 2024 11:51) (95% - 97%)    Parameters below as of 09 Jan 2024 11:51  Patient On (Oxygen Delivery Method): room air        GENERAL: NAD, lying in bed comfortably  HEAD:  Normocephalic  EYES:  conjunctiva and sclera clear  ENT: Moist mucous membranes  NECK: Supple  CHEST/LUNG: Clear to auscultation bilaterally; No rales or rhonchi; no wheezing. Unlabored respirations  HEART: Regular rate and rhythm; S1S2+  ABDOMEN: Bowel sounds present; Soft, Nontender, Nondistended.   EXTREMITIES:  + distal Peripheral Pulses;  No cyanosis, or edema  NERVOUS SYSTEM:  Alert & Oriented X3;  No gross focal deficits   MSK: moves all extremities  SKIN: No rashes     LABS:                        11.9   8.03  )-----------( 221      ( 09 Jan 2024 06:32 )             35.1     09 Jan 2024 06:32    132    |  103    |  25     ----------------------------<  103    4.2     |  26     |  0.85     Ca    8.2        09 Jan 2024 06:32        Urinalysis Basic - ( 09 Jan 2024 06:32 )    Color: x / Appearance: x / SG: x / pH: x  Gluc: 103 mg/dL / Ketone: x  / Bili: x / Urobili: x   Blood: x / Protein: x / Nitrite: x   Leuk Esterase: x / RBC: x / WBC x   Sq Epi: x / Non Sq Epi: x / Bacteria: x      CAPILLARY BLOOD GLUCOSE            Culture - Urine (collected 01-06-24 @ 18:59)  Source: Clean Catch Clean Catch (Midstream)  Final Report (01-09-24 @ 00:06):    Normal Urogenital gaurav present        RADIOLOGY & ADDITIONAL TESTS:    Personally reviewed.     Consultant(s) Notes Reviewed:  [x] YES  [ ] NO    Plan of care discussed with patient; all questions answered

## 2024-01-10 PROCEDURE — 99233 SBSQ HOSP IP/OBS HIGH 50: CPT

## 2024-01-10 PROCEDURE — 99232 SBSQ HOSP IP/OBS MODERATE 35: CPT

## 2024-01-10 PROCEDURE — 93010 ELECTROCARDIOGRAM REPORT: CPT

## 2024-01-10 RX ORDER — LISINOPRIL 2.5 MG/1
20 TABLET ORAL DAILY
Refills: 0 | Status: DISCONTINUED | OUTPATIENT
Start: 2024-01-10 | End: 2024-01-11

## 2024-01-10 RX ORDER — SOTALOL HCL 120 MG
40 TABLET ORAL EVERY 12 HOURS
Refills: 0 | Status: DISCONTINUED | OUTPATIENT
Start: 2024-01-10 | End: 2024-01-13

## 2024-01-10 RX ADMIN — LISINOPRIL 20 MILLIGRAM(S): 2.5 TABLET ORAL at 13:45

## 2024-01-10 RX ADMIN — Medication 3 MILLIGRAM(S): at 21:17

## 2024-01-10 RX ADMIN — Medication 80 MILLIGRAM(S): at 10:07

## 2024-01-10 RX ADMIN — Medication 25 MICROGRAM(S): at 05:45

## 2024-01-10 RX ADMIN — ENOXAPARIN SODIUM 30 MILLIGRAM(S): 100 INJECTION SUBCUTANEOUS at 05:45

## 2024-01-10 RX ADMIN — Medication 0.1 MILLIGRAM(S): at 05:45

## 2024-01-10 RX ADMIN — Medication 1 TABLET(S): at 12:17

## 2024-01-10 RX ADMIN — SODIUM CHLORIDE 1 GRAM(S): 9 INJECTION INTRAMUSCULAR; INTRAVENOUS; SUBCUTANEOUS at 05:45

## 2024-01-10 RX ADMIN — SIMVASTATIN 10 MILLIGRAM(S): 20 TABLET, FILM COATED ORAL at 21:17

## 2024-01-10 RX ADMIN — Medication 40 MILLIGRAM(S): at 18:06

## 2024-01-10 NOTE — OCCUPATIONAL THERAPY INITIAL EVALUATION ADULT - ADL RETRAINING, OT EVAL
113 Patient will dress upper body with no assistance and set-up in 2-4 sessions. Patient will dress lower body with supervision, AE as needed in 2-4 sessions.

## 2024-01-10 NOTE — PHYSICAL THERAPY INITIAL EVALUATION ADULT - [id number]_PT
OOB to chair 1-person assist +RW; ambulate to bathroom 1-person assist +RW; home exercise program. Recommend reinforcing education on falls prevention in inpatient setting while under medical management.

## 2024-01-10 NOTE — PHYSICAL THERAPY INITIAL EVALUATION ADULT - GENERAL OBSERVATIONS, REHAB EVAL
Patient chart reviewed, events noted. Patient cleared to be seen for therapy by RN prior to session. Patient received semi-reclined in bed +purewick +remote tele +IV (not connected to IV pole) +room air +in NAD. Patient agreeable to PT at this time.

## 2024-01-10 NOTE — OCCUPATIONAL THERAPY INITIAL EVALUATION ADULT - RANGE OF MOTION EXAMINATION, UPPER EXTREMITY
except R shoulder flexion AROM 0-30 degrees, PROM 0-90 degrees. Fine motor skills: WFL's-minimally impaired. Arthritic changes noted bilateral hands; R hand ~3/4 gross grasp, left gross grasp: WFLs./bilateral UE Active ROM was WFL  (within functional limits)

## 2024-01-10 NOTE — CAREGIVER ENGAGEMENT NOTE - CAREGIVER OUTREACH NOTES - FREE TEXT
CM met with patient and family at bedside to discuss home care services. Home care choices provided. Patient and family agreeable to [facility] [phone] referral started. CM remains available. CM met with patient and family at bedside to discuss home care services. Home care choices provided. Patient and family agreeable to [facility] [phone] referral started. CM remains available.

## 2024-01-10 NOTE — PHYSICAL THERAPY INITIAL EVALUATION ADULT - MANUAL MUSCLE TESTING RESULTS, REHAB EVAL
; B UE and B LE grossly < or = to 3+/5 via functional assessment, no resistance applied./grossly assessed due to ; L UE and B LE grossly < or = to 3+/5 via functional assessment, no resistance applied. R UE grossly 3-/5 via functional assessment, no resistance applied./grossly assessed due to

## 2024-01-10 NOTE — PROGRESS NOTE ADULT - PROBLEM SELECTOR PLAN 1
Presented with intermittent palpitations  - CTA: Negative for PE, negative for lung consolidation  - EKG: NSR @ 117bpm  - No CP, SOB associated   - RVP negative   - Monitor on Telemetry - stable, HR controlled  - Cardiology consult noted, changed to sotalol  - echo pending Presented with intermittent palpitations  - CTA: Negative for PE, negative for lung consolidation  - EKG: NSR @ 117bpm  - No CP, SOB associated   - RVP negative   - Monitor on Telemetry  - Cardiology consult noted, changed to sotalol  - Echo pending Presented with intermittent palpitations  - CTA: Negative for PE, negative for lung consolidation  - No CP, SOB associated   - RVP negative  - Monitor on Telemetry  - Cardiology consult noted, changed to sotalol  - Echo pending

## 2024-01-10 NOTE — OCCUPATIONAL THERAPY INITIAL EVALUATION ADULT - RANGE OF MOTION, PT EVAL
Patient will improve right shd flexion A/PROM by 5 degrees to enable patient to perform ADLs with less assistance in 2-4 sessions.

## 2024-01-10 NOTE — PROGRESS NOTE ADULT - ASSESSMENT
89 yo female w/ PMH of Atrial Tachycardia, HTN, HLD, Hypothyroidism and Anxiety admitted for palpitations. 89 yo female w/ PMH of Atrial Tachycardia, HTN, HLD, Hypothyroidism and Anxiety admitted for palpitations. 89 yo female w/ PMH of Atrial Tachycardia, HTN, HLD, Hypothyroidism and Anxiety, admitted for palpitations.

## 2024-01-10 NOTE — PROGRESS NOTE ADULT - PROBLEM SELECTOR PLAN 3
/79 on admission  - c/w lisinopril, sotalol, clonidine   - Monitor routine hemodynamics  - hypotensive after one IV hydralazine in am, d/c'd hydralazine /79 on admission   - hypotensive after one IV hydralazine in am, d/c'd hydralazine  - Lisinopril d/c'd to allow room with BP for sotalol  - c/w clonidine for now, may be contributing to bradycardia  - Monitor hemodynamics /79 on admission, elevated at times in setting of anxiety  - hypotensive after one IV hydralazine, d/c'd hydralazine  - clonidine d/c'd per cardio as it may be contributing to bradycardia  - restarted Lisinopril  - Monitor hemodynamics

## 2024-01-10 NOTE — PHYSICAL THERAPY INITIAL EVALUATION ADULT - TRANSFER TRAINING, PT EVAL
Patient will perform stand<>sit independently to be able to get up and go to the bathroom, within 3 to 5 sessions.

## 2024-01-10 NOTE — PHYSICAL THERAPY INITIAL EVALUATION ADULT - PERTINENT HX OF CURRENT PROBLEM, REHAB EVAL
As per EMR "89 yo female w/ pmh of Atrial Tachycardia, Htn, Hld, Hypothyroidism, anxiety presents with 3 days of intermittent palpitations." PT ordered with comment "fall", pt denies falls, admitted with heart palpitations.

## 2024-01-10 NOTE — OCCUPATIONAL THERAPY INITIAL EVALUATION ADULT - ADDITIONAL COMMENTS
Pt lives with her dtr in a 2 level house with 3 steps with handrail to enter and 12 steps with handrail to bedroom. Pt has a bathtub with shower curtain and grab bars. Pt ambulates using a rolling walker. Pt owns a RW, cane and shower chair with back. Stall shower on main level. 10 steps with bilateral handrails to basement (laundry room). Pt is right hand dominant. Pt exhibits decreased A/PROM right shoulder; patient reports this is an old injury. Pt performed sit to stand with supervision and ambulated 15' using RW with CG/close supervision.

## 2024-01-10 NOTE — PROGRESS NOTE ADULT - NS ATTEND AMEND GEN_ALL_CORE FT
89 yo female w/ pmh of Atrial Tachycardia, Htn, Hld, Hypothyrodism, anxiety presents with 3 days of intermittent palpitations.    - pt with runs of PSVT. Appears to be having symptomatic more frequent PAT.   - noted to have SB in 50s on cardizem and toprol at baseline  -  cardizem and toprol dc'ed.   - started on sotalol, would decrease dose to 40mg BID given her age and QTc prolongation from her baseline (~400ms)  - Now hypertensive, can resume low dose Lisinopril  - Continue to check EKGs two hours post each dose of sotalol  - Apparently had dizziness with Amio  - continue statin  - TTE pending

## 2024-01-10 NOTE — PROGRESS NOTE ADULT - SUBJECTIVE AND OBJECTIVE BOX
Patient is a 89y old  Female who presents with a chief complaint of Palpitations (10 [place] 2024 10:00)    Patient seen and examined at bedside. No acute overnight events. She feels anxious at times (has had this for years, was prescribed Xanax at home 0.5 mg of which she has tried taking half a tablet at home) and this contributes to elevating her BP. Otherwise, no CP, SOB or other complaints.    ALLERGIES:  penicillins (Unknown)    MEDICATIONS  (STANDING):  enoxaparin Injectable 30 milliGRAM(s) SubCutaneous every 24 hours  levothyroxine 25 MICROGram(s) Oral daily  lisinopril 20 milliGRAM(s) Oral daily  multivitamin/minerals 1 Tablet(s) Oral daily  polyethylene glycol 3350 17 Gram(s) Oral daily  simvastatin 10 milliGRAM(s) Oral at bedtime  sotalol. 80 milliGRAM(s) Oral every 12 hours    MEDICATIONS  (PRN):  acetaminophen     Tablet .. 650 milliGRAM(s) Oral every 6 hours PRN Temp greater or equal to 38C (100.4F), Mild Pain (1 - 3)  melatonin 3 milliGRAM(s) Oral at bedtime PRN Insomnia    Vital Signs Last 24 Hrs  T(F): 98.3 (10 Bay 2024 11:42), Max: 98.3 (10 Bay 2024 04:34)  HR: 52 (10 Bay 2024 11:42) (52 - 93)  BP: 164/68 (10 Bay 2024 11:42) (105/58 - 188/92)  RR: 16 (10 Bay 2024 11:42) (16 - 18)  SpO2: 96% (10 Bay 2024 11:42) (93% - 96%)    I&O's Summary    09 Jan 2024 07:01  -  10 Bay 2024 07:00  --------------------------------------------------------  IN: 200 mL / OUT: 1300 mL / NET: -1100 mL      BMI (kg/m2): 22.2 (01-06-24 @ 17:17)    PHYSICAL EXAM:  General: NAD, lying in bed  Eyes: Anicteric  ENT: Moist mucous membranes  Neck: Supple, No JVD  Lungs: Clear to auscultation bilaterally, normal respiratory effort  Cardio: RRR, S1/S2, No murmurs  Abdomen: Soft, Nontender, Nondistended; Bowel sounds present  Extremities: No calf tenderness, No pitting edema, no digital cyanosis  Skin: Warm, dry  Psych: A&O x 3, follows commands    LABS:                        11.9   8.03  )-----------( 221      ( 09 Jan 2024 06:32 )             35.1       01-09    132  |  103  |  25  ----------------------------<  103  4.2   |  26  |  0.85    Ca    8.2      09 Jan 2024 06:32         01-07 Chol 211 mg/dL LDL -- HDL 63 mg/dL Trig 48 mg/dL      Culture - Urine (collected 06 Jan 2024 18:59)  Source: Clean Catch Clean Catch (Midstream)  Final Report (09 Jan 2024 00:06):    Normal Urogenital gaurav present          RADIOLOGY & ADDITIONAL TESTS:   Personally reviewed.     Consultant(s) Notes Reviewed:  [x] YES  [ ] NO    Care Discussed with Consultants/Other Providers:

## 2024-01-10 NOTE — PHYSICAL THERAPY INITIAL EVALUATION ADULT - ADDITIONAL COMMENTS
Pt lives with her daughter who works, in an private house +3 ANNA +10 steps to basement (laundry). Pt states she ambulates with RW, also "scoots" around house (upon further explanation regarding this comment, pt states she "shuffles" to get quickly from one place to another, describes walking on both feet with this); pt states she has some right shoulder stiffness however is able to dress herself and wash her hair, denies any recent falls. Pt admits she gets anxious at times.

## 2024-01-10 NOTE — PATIENT CHOICE NOTE. - NSPTCHOICESTATE_GEN_ALL_CORE
I have met with the patient and/or caregiver to discuss discharge goals and treatment plan. Patient and/or caregiver also provided with instructions on accessing the CMS Compare websites for additional information related to Post Acute Provider quality and resource use measures to assist them in evaluation of the providers and in selecting their post-acute provider of choice. Patient and caregiver were informed of the facilities that are owned and/or operated by [facility]. I have discussed with the patient the availability of in-network facilities and providers. Patient and caregiver provided with a list of post-acute providers whose services are appropriate to the discharge plans and patient needs.     For patient requiring durable medical equipment, patient and/or caregiver were informed that they have the right to request who provides the required equipment. I have met with the patient and/or caregiver to discuss discharge goals and treatment plan. Patient and/or caregiver also provided with instructions on accessing the CMS Compare websites for additional information related to Post Acute Provider quality and resource use measures to assist them in evaluation of the providers and in selecting their post-acute provider of choice. Patient and caregiver were informed of the facilities that are owned and/or operated by [facility]. I have discussed with the patient the availability of in-network facilities and providers. Patient and caregiver provided with a list of post-acute providers whose services are appropriate to the discharge plans and patient needs.     For patient requiring durable medical equipment, patient and/or caregiver were informed that they have the right to request who provides the required equipment.

## 2024-01-10 NOTE — PHYSICAL THERAPY INITIAL EVALUATION ADULT - RANGE OF MOTION EXAMINATION, REHAB EVAL
R UE limited in shoulder flexion to about 50% functional range, otherwise R UE grossly WFL./Left LE ROM was WFL (within functional limits)/bilateral lower extremity ROM was WFL (within functional limits)/deficits as listed below

## 2024-01-10 NOTE — PROGRESS NOTE ADULT - ASSESSMENT
89 yo female w/ pmh of Atrial Tachycardia, Htn, Hld, Hypothyrodism, anxiety presents with 3 days of intermittent palpitations.    Palpitations/PSVT/PAT's  - Presented with symptomatic PSVT's  and PAT's  - No further atrial arrhythmias on tele  - Maintaining SB/SR on tele at 40's-90's  - Continue Sotalol  - Continue off home Cardizem and Toprol dc'ed.   - EKG with baseline    - f/u EKG Daily to eval QTC.  Today's, 1/10, Qtc = 448 ms  - Unable to utilize Amio due to dizziness  - Continue statin    - No signs of significant ischemia or volume overload.   - EKG nonischemic  - Appears compensated from HF POV.  Non-orthopneic on RA  - TTE pending    - BP labile at sytolic 110-180  - Would D/C Clonidine as it can be contributing to her bradycardia  - Resume home ACEI    - Monitor and replete Lytes. Keep K > 4 and Mg > 2  - Will continue to follow.    Lety Douglas DNP, NP-C, AGACNP-C  Cardiology   Call TEAMS        89 yo female w/ pmh of Atrial Tachycardia, Htn, Hld, Hypothyrodism, anxiety presents with 3 days of intermittent palpitations.    Palpitations/PSVT/PAT  - Presented with symptomatic PSVT's  and PAT's  - No further atrial arrhythmias on tele  - Maintaining SB/SR on tele at 40's-90's  - Continue Sotalol, would decrease dose to 40mg BID given her QTc and reduced CrCl  - Continue off home Cardizem and Toprol dc'ed.   - EKG with baseline    - f/u EKG Daily to eval QTC.  Today's, 1/10, Qtc = 448 ms  - Unable to utilize Amio due to dizziness  - Continue statin    - No signs of significant ischemia or volume overload.   - EKG nonischemic  - Appears compensated from HF POV.  Non-orthopneic on RA  - TTE pending    - BP labile at sytolic 110-180  - Would D/C Clonidine as it can be contributing to her bradycardia  - Resume home ACEI    - Monitor and replete Lytes. Keep K > 4 and Mg > 2  - Will continue to follow.    Lety Douglas DNP, NP-C, AGACNP-C  Cardiology   Call TEAMS        89 yo female w/ pmh of Atrial Tachycardia, Htn, Hld, Hypothyrodism, anxiety presents with 3 days of intermittent palpitations.    Palpitations/PSVT/PAT  - Presented with symptomatic PSVT's  and PAT's  - No further atrial arrhythmias on tele  - Maintaining SB/SR on tele at 40's-90's  - Continue Sotalol, would decrease dose to 40mg BID given her QTc and reduced CrCl  - Continue off home Cardizem and Toprol dc'ed.   - EKG with baseline    - f/u EKG Daily to eval QTC.  Today's, 1/10, Qtc = 448 ms  - Unable to utilize Amio due to dizziness  - Continue statin    - No signs of significant ischemia or volume overload.   - EKG nonischemic  - Appears compensated from HF POV.  Non-orthopneic on RA  - TTE pending    - BP labile at sytolic 110-180  - Would D/C Clonidine as it can be contributing to her bradycardia  - Resume home ACEI    - Monitor and replete Lytes. Keep K > 4 and Mg > 2  - Will continue to follow.    Lety Douglas DNP, NP-C, AGACNP-C  Cardiology   Call TEAMS

## 2024-01-10 NOTE — OCCUPATIONAL THERAPY INITIAL EVALUATION ADULT - PERTINENT HX OF CURRENT PROBLEM, REHAB EVAL
89 yo female with PMH Atrial Tachycardia, HTN, HLD, Hypothyrodism, anxiety presents with 3 days of intermittent palpitations. pt states that she started to feel palpitations this past Thursday without any other associated sxs. She first thought the palpitations were due to her anxiety so she took one Xanax she has prescribed for anxiety prn which never relieved the palpitations States she called her outpatient cardiologist and left a message with the nurse on staff. States the palpitations come and go intermittently throughout each day. The patient was then told from outpatient cardio the next day to take one extra metoprolol tartrate 50mg in the middle of the day to help relieve her sxs which she believes does help, but they keep coming back. States that she took an extra dose of metoprolol yesterday at noon and today at noon but has not taken her scheduled evening dose today. Denies dizziness, chest pain, arm pain, jaw pain, n/v/d/c, abd pain, sob, lightheadedness. Imaging: CXR: Negative for acute parenchymal disease CTA: Negative for PE. No airspace consolidation. Patient admitted 1/6/24 with palpitations.

## 2024-01-10 NOTE — PROGRESS NOTE ADULT - SUBJECTIVE AND OBJECTIVE BOX
[facility] -- Hugo Goddard, Sima, Adan, Heath, , Mario Garcia  Office # [phone]    Follow Up:  Palpitations, PSVT's, PAT's    Subjective/Observations: Seen awake and alert.  Comfortable on RA.  Denies CP or palpitations, SOB, SIMENTAL or orthopnea.  No significant tele events.  Admits to have been working herself up for every little thing    REVIEW OF SYSTEMS: All other review of systems is negative unless indicated above  PAST MEDICAL & SURGICAL HISTORY:  HTN - Hypertension  Hypercholesteremia  Atrial tachycardia  Hypothyroid  S/P Cataract Surgery  B/L  Liver cyst  removed    MEDICATIONS  (STANDING):  cloNIDine 0.1 milliGRAM(s) Oral two times a day  enoxaparin Injectable 30 milliGRAM(s) SubCutaneous every 24 hours  levothyroxine 25 MICROGram(s) Oral daily  multivitamin/minerals 1 Tablet(s) Oral daily  polyethylene glycol 3350 17 Gram(s) Oral daily  simvastatin 10 milliGRAM(s) Oral at bedtime  sotalol. 80 milliGRAM(s) Oral every 12 hours    MEDICATIONS  (PRN):  acetaminophen     Tablet .. 650 milliGRAM(s) Oral every 6 hours PRN Temp greater or equal to 38C (100.4F), Mild Pain (1 - 3)  melatonin 3 milliGRAM(s) Oral at bedtime PRN Insomnia    Allergies    penicillins (Unknown)    Intolerances    Vital Signs Last 24 Hrs  T(C): 36.8 (10 Bay 2024 04:34), Max: 36.8 (09 Jan 2024 21:16)  T(F): 98.3 (10 Bay 2024 04:34), Max: 98.3 (10 Bay 2024 04:34)  HR: 53 (10 Bay 2024 04:34) (53 - 92)  BP: 105/58 (10 Bay 2024 04:34) (105/58 - 188/92)  BP(mean): --  RR: 18 (10 Bay 2024 04:34) (17 - 18)  SpO2: 93% (10 Bay 2024 04:34) (93% - 96%)    Parameters below as of 10 Bay 2024 04:34  Patient On (Oxygen Delivery Method): room air    I&O's Summary    09 Jan 2024 07:01  -  10 Bay 2024 07:00  --------------------------------------------------------  IN: 200 mL / OUT: 1300 mL / NET: -1100 mL     PHYSICAL EXAM:  TELE: Sinus babar/NSR  Constitutional: NAD, awake and alert, well-developed  HEENT: Moist Mucous Membranes, Anicteric  Pulmonary: Non-labored, breath sounds are clear bilaterally, No wheezing, rales or rhonchi  Cardiovascular: Regular, S1 and S2, No murmurs, rubs, gallops or clicks  Gastrointestinal: Bowel Sounds present, soft, nontender.   Lymph: No peripheral edema. No lymphadenopathy.  Skin: No visible rashes or ulcers.  Psych:  Mood & affect appropriate  LABS: All Labs Reviewed:                        11.9   8.03  )-----------( 221      ( 09 Jan 2024 06:32 )             35.1     09 Jan 2024 06:32    132    |  103    |  25     ----------------------------<  103    4.2     |  26     |  0.85     Ca    8.2        09 Jan 2024 06:32            12 Lead ECG:   Ventricular Rate 54 BPM    Atrial Rate 54 BPM    P-R Interval 170 ms    QRS Duration 92 ms    Q-T Interval 472 ms    QTC Calculation(Bazett) 447 ms    P Axis 54 degrees    R Axis -60 degrees    T Axis 34 degrees    Diagnosis Line Sinus bradycardia with sinus arrhythmia  Left anterior fascicular block  Septal infarct , age undetermined  Abnormal ECG  When compared with ECG of 07-JAN-2024 10:25,  No significant change was found  Confirmed by ANIVAL LOPEZ ([id number]) on 1/9/2024 2:20:32 PM (01-09-24 @ 12:01)      Patient name: ROBERT BELLA  YOB: 1929   Age: 84 (F)   MR#: [id number]  Study Date: 4/2/2014  Location: O/PSonographer: Jesica Benjamin  Study quality: Technically good  Referring Physician: Chavo Whitlock MD  ------------------------------------------------------------------------  Blood Pressure: 163/80 mmHg  Height: 5ft 0in  Weight: 134 lb  BSA: 1.6 m2  ------------------------------------------------------------------------  PROCEDURE: Transthoracic echocardiogram with 2-D, M-Mode  and complete spectral and color flow Doppler.  INDICATION: Abnormal Echocardiogram/EKG (794.31)  ------------------------------------------------------------------------  DIMENSIONS:  Dimensions:     Normal Values:  LA:     2.6 cm    2.0 - 4.0 cm  Ao:     2.8 cm    2.0 - 3.8 cm  SEPTUM: 0.6 cm    0.6 - 1.2 cm  PWT:    0.6 cm    0.6 - 1.1 cm  LVIDd:  3.9 cm    3.0 - 5.6 cm  LVIDs:  1.8 cm    1.8 - 4.0 cm  Derived Variables:  LVMI: 39 g/m2  RWT: 0.30  Fractional short: 54 %  Ejection Fraction: 85 %  ------------------------------------------------------------------------  OBSERVATIONS:  Mitral Valve: Normal mitral valve.  Aortic Root: Normal aortic root.  Aortic Valve: Normal trileaflet aortic valve.  Left Atrium: Normal left atrium  Left Ventricle: Normal left ventricular internal dimensions  and wall thicknesses.  Hyperdynamic left ventricle.  Right Heart: Normal right atrium.  Normal right ventricular size and function.  Normal tricuspid valve. Moderate tricuspid regurgitation.  Normal pulmonic valve.  Estimated pulmonary artery systolic pressure equals 43 mm  Hg, assuming right atrial pressure equals 10  mm Hg,  consistent with mild pulmonary hypertension.  Pericardium/PleuraNormal pericardium with no pericardial  effusion.  Large cystic structure noted within the liver of unclear  etiology. Recommend dedicated sonography to evaluate.  ------------------------------------------------------------------------  CONCLUSIONS:  1. Normal left ventricular internal dimensions and wall  thicknesses.  2. Hyperdynamic left ventricle.  ------------------------------------------------------------------------  Confirmed on  4/2/2014 - 14:17:06 by Jesus Crain M.D.  ------------------------------------------------------------------------      [facility] -- Hugo Goddard, Sima, Adan, Heath, , Mario Garcia  Office # [phone]    Follow Up:  Palpitations, PSVT's, PAT's    Subjective/Observations: Seen awake and alert.  Comfortable on RA.  Denies CP or palpitations, SOB, SIMENTAL or orthopnea.  No significant tele events.  Admits to have been working herself up for every little thing    REVIEW OF SYSTEMS: All other review of systems is negative unless indicated above  PAST MEDICAL & SURGICAL HISTORY:  HTN - Hypertension  Hypercholesteremia  Atrial tachycardia  Hypothyroid  S/P Cataract Surgery  B/L  Liver cyst  removed    MEDICATIONS  (STANDING):  cloNIDine 0.1 milliGRAM(s) Oral two times a day  enoxaparin Injectable 30 milliGRAM(s) SubCutaneous every 24 hours  levothyroxine 25 MICROGram(s) Oral daily  multivitamin/minerals 1 Tablet(s) Oral daily  polyethylene glycol 3350 17 Gram(s) Oral daily  simvastatin 10 milliGRAM(s) Oral at bedtime  sotalol. 80 milliGRAM(s) Oral every 12 hours    MEDICATIONS  (PRN):  acetaminophen     Tablet .. 650 milliGRAM(s) Oral every 6 hours PRN Temp greater or equal to 38C (100.4F), Mild Pain (1 - 3)  melatonin 3 milliGRAM(s) Oral at bedtime PRN Insomnia    Allergies    penicillins (Unknown)    Intolerances    Vital Signs Last 24 Hrs  T(C): 36.8 (10 Bay 2024 04:34), Max: 36.8 (09 Jan 2024 21:16)  T(F): 98.3 (10 Bay 2024 04:34), Max: 98.3 (10 Bay 2024 04:34)  HR: 53 (10 Bay 2024 04:34) (53 - 92)  BP: 105/58 (10 Bay 2024 04:34) (105/58 - 188/92)  BP(mean): --  RR: 18 (10 Bay 2024 04:34) (17 - 18)  SpO2: 93% (10 Bay 2024 04:34) (93% - 96%)    Parameters below as of 10 Bay 2024 04:34  Patient On (Oxygen Delivery Method): room air    I&O's Summary    09 Jan 2024 07:01  -  10 Bay 2024 07:00  --------------------------------------------------------  IN: 200 mL / OUT: 1300 mL / NET: -1100 mL     PHYSICAL EXAM:  TELE: Sinus babar/NSR  Constitutional: NAD, awake and alert, well-developed  HEENT: Moist Mucous Membranes, Anicteric  Pulmonary: Non-labored, breath sounds are clear bilaterally, No wheezing, rales or rhonchi  Cardiovascular: Regular, S1 and S2, No murmurs, rubs, gallops or clicks  Gastrointestinal: Bowel Sounds present, soft, nontender.   Lymph: No peripheral edema. No lymphadenopathy.  Skin: No visible rashes or ulcers.  Psych:  Mood & affect appropriate  LABS: All Labs Reviewed:                        11.9   8.03  )-----------( 221      ( 09 Jan 2024 06:32 )             35.1     09 Jan 2024 06:32    132    |  103    |  25     ----------------------------<  103    4.2     |  26     |  0.85     Ca    8.2        09 Jan 2024 06:32            12 Lead ECG:   Ventricular Rate 54 BPM    Atrial Rate 54 BPM    P-R Interval 170 ms    QRS Duration 92 ms    Q-T Interval 472 ms    QTC Calculation(Bazett) 447 ms    P Axis 54 degrees    R Axis -60 degrees    T Axis 34 degrees    Diagnosis Line Sinus bradycardia with sinus arrhythmia  Left anterior fascicular block  Septal infarct , age undetermined  Abnormal ECG  When compared with ECG of 07-JAN-2024 10:25,  No significant change was found  Confirmed by ANIVAL LOPEZ ([id number]) on 1/9/2024 2:20:32 PM (01-09-24 @ 12:01)      Patient name: ROBERT BELLA  YOB: 1929   Age: 84 (F)   MR#: [id number]  Study Date: 4/2/2014  Location: O/PSonographer: Jesica Benjamin  Study quality: Technically good  Referring Physician: Chavo Whitlock MD  ------------------------------------------------------------------------  Blood Pressure: 163/80 mmHg  Height: 5ft 0in  Weight: 134 lb  BSA: 1.6 m2  ------------------------------------------------------------------------  PROCEDURE: Transthoracic echocardiogram with 2-D, M-Mode  and complete spectral and color flow Doppler.  INDICATION: Abnormal Echocardiogram/EKG (794.31)  ------------------------------------------------------------------------  DIMENSIONS:  Dimensions:     Normal Values:  LA:     2.6 cm    2.0 - 4.0 cm  Ao:     2.8 cm    2.0 - 3.8 cm  SEPTUM: 0.6 cm    0.6 - 1.2 cm  PWT:    0.6 cm    0.6 - 1.1 cm  LVIDd:  3.9 cm    3.0 - 5.6 cm  LVIDs:  1.8 cm    1.8 - 4.0 cm  Derived Variables:  LVMI: 39 g/m2  RWT: 0.30  Fractional short: 54 %  Ejection Fraction: 85 %  ------------------------------------------------------------------------  OBSERVATIONS:  Mitral Valve: Normal mitral valve.  Aortic Root: Normal aortic root.  Aortic Valve: Normal trileaflet aortic valve.  Left Atrium: Normal left atrium  Left Ventricle: Normal left ventricular internal dimensions  and wall thicknesses.  Hyperdynamic left ventricle.  Right Heart: Normal right atrium.  Normal right ventricular size and function.  Normal tricuspid valve. Moderate tricuspid regurgitation.  Normal pulmonic valve.  Estimated pulmonary artery systolic pressure equals 43 mm  Hg, assuming right atrial pressure equals 10  mm Hg,  consistent with mild pulmonary hypertension.  Pericardium/PleuraNormal pericardium with no pericardial  effusion.  Large cystic structure noted within the liver of unclear  etiology. Recommend dedicated sonography to evaluate.  ------------------------------------------------------------------------  CONCLUSIONS:  1. Normal left ventricular internal dimensions and wall  thicknesses.  2. Hyperdynamic left ventricle.  ------------------------------------------------------------------------  Confirmed on  4/2/2014 - 14:17:06 by Jesus Crain M.D.  ------------------------------------------------------------------------      [facility] -- Hugo Goddard, Sima, Adan, Heath, , Mario Garcia  Office # [phone]    Follow Up:  Palpitations, PSVT's, PAT's    Subjective/Observations: Seen awake and alert.  Comfortable on RA.  Denies CP or palpitations, SOB, SIMENTAL or orthopnea.  No significant tele events.  Admits to have been working herself up for every little thing    REVIEW OF SYSTEMS: All other review of systems is negative unless indicated above  PAST MEDICAL & SURGICAL HISTORY:  HTN - Hypertension  Hypercholesteremia  Atrial tachycardia  Hypothyroid  S/P Cataract Surgery  B/L  Liver cyst  removed    MEDICATIONS  (STANDING):  cloNIDine 0.1 milliGRAM(s) Oral two times a day  enoxaparin Injectable 30 milliGRAM(s) SubCutaneous every 24 hours  levothyroxine 25 MICROGram(s) Oral daily  multivitamin/minerals 1 Tablet(s) Oral daily  polyethylene glycol 3350 17 Gram(s) Oral daily  simvastatin 10 milliGRAM(s) Oral at bedtime  sotalol. 80 milliGRAM(s) Oral every 12 hours    MEDICATIONS  (PRN):  acetaminophen     Tablet .. 650 milliGRAM(s) Oral every 6 hours PRN Temp greater or equal to 38C (100.4F), Mild Pain (1 - 3)  melatonin 3 milliGRAM(s) Oral at bedtime PRN Insomnia    Allergies    penicillins (Unknown)    Intolerances    Vital Signs Last 24 Hrs  T(C): 36.8 (10 Bay 2024 04:34), Max: 36.8 (09 Jan 2024 21:16)  T(F): 98.3 (10 Bay 2024 04:34), Max: 98.3 (10 Bay 2024 04:34)  HR: 53 (10 Bay 2024 04:34) (53 - 92)  BP: 105/58 (10 Bay 2024 04:34) (105/58 - 188/92)  BP(mean): --  RR: 18 (10 Bay 2024 04:34) (17 - 18)  SpO2: 93% (10 Bay 2024 04:34) (93% - 96%)    Parameters below as of 10 Bay 2024 04:34  Patient On (Oxygen Delivery Method): room air    I&O's Summary    09 Jan 2024 07:01  -  10 Bay 2024 07:00  --------------------------------------------------------  IN: 200 mL / OUT: 1300 mL / NET: -1100 mL     PHYSICAL EXAM:  TELE: Sinus babar/NSR  Constitutional: NAD, awake and alert, well-developed  HEENT: Moist Mucous Membranes, Anicteric  Pulmonary: Non-labored, breath sounds are clear bilaterally, No wheezing, rales or rhonchi  Cardiovascular: Regular, S1 and S2, No murmurs, rubs, gallops or clicks  Gastrointestinal: Bowel Sounds present, soft, nontender.   Lymph: No peripheral edema. No lymphadenopathy.  Skin: No visible rashes or ulcers.  Psych:  Mood & affect appropriate  LABS: All Labs Reviewed:                        11.9   8.03  )-----------( 221      ( 09 Jan 2024 06:32 )             35.1     09 Jan 2024 06:32    132    |  103    |  25     ----------------------------<  103    4.2     |  26     |  0.85     Ca    8.2        09 Jan 2024 06:32            12 Lead ECG:   Ventricular Rate 54 BPM    Atrial Rate 54 BPM    P-R Interval 170 ms    QRS Duration 92 ms    Q-T Interval 472 ms    QTC Calculation(Bazett) 447 ms    P Axis 54 degrees    R Axis -60 degrees    T Axis 34 degrees    Diagnosis Line Sinus bradycardia with sinus arrhythmia  Left anterior fascicular block  Septal infarct , age undetermined  Abnormal ECG  When compared with ECG of 07-JAN-2024 10:25,  No significant change was found  Confirmed by ANIVAL LOPEZ ([id number]) on 1/9/2024 2:20:32 PM (01-09-24 @ 12:01)      Patient name: ROBERT BELLA  YOB: 1929   Age: 84 (F)   MR#: [id number]  Study Date: 4/2/2014  Location: O/PSonographer: Jesica Benjamin  Study quality: Technically good  Referring Physician: Chavo Whitlock MD  ------------------------------------------------------------------------  Blood Pressure: 163/80 mmHg  Height: 5ft 0in  Weight: 134 lb  BSA: 1.6 m2  ------------------------------------------------------------------------  PROCEDURE: Transthoracic echocardiogram with 2-D, M-Mode  and complete spectral and color flow Doppler.  INDICATION: Abnormal Echocardiogram/EKG (794.31)  ------------------------------------------------------------------------  DIMENSIONS:  Dimensions:     Normal Values:  LA:     2.6 cm    2.0 - 4.0 cm  Ao:     2.8 cm    2.0 - 3.8 cm  SEPTUM: 0.6 cm    0.6 - 1.2 cm  PWT:    0.6 cm    0.6 - 1.1 cm  LVIDd:  3.9 cm    3.0 - 5.6 cm  LVIDs:  1.8 cm    1.8 - 4.0 cm  Derived Variables:  LVMI: 39 g/m2  RWT: 0.30  Fractional short: 54 %  Ejection Fraction: 85 %  ------------------------------------------------------------------------  OBSERVATIONS:  Mitral Valve: Normal mitral valve.  Aortic Root: Normal aortic root.  Aortic Valve: Normal trileaflet aortic valve.  Left Atrium: Normal left atrium  Left Ventricle: Normal left ventricular internal dimensions  and wall thicknesses.  Hyperdynamic left ventricle.  Right Heart: Normal right atrium.  Normal right ventricular size and function.  Normal tricuspid valve. Moderate tricuspid regurgitation.  Normal pulmonic valve.  Estimated pulmonary artery systolic pressure equals 43 mm  Hg, assuming right atrial pressure equals 10  mm Hg,  consistent with mild pulmonary hypertension.  Pericardium/PleuraNormal pericardium with no pericardial  effusion.  Large cystic structure noted within the liver of unclear  etiology. Recommend dedicated sonography to evaluate.  ------------------------------------------------------------------------  CONCLUSIONS:  1. Normal left ventricular internal dimensions and wall  thicknesses.  2. Hyperdynamic left ventricle.  ------------------------------------------------------------------------  Confirmed on  4/2/2014 - 14:17:06 by Jesus Crain M.D.  ------------------------------------------------------------------------      [facility] -- Hugo Goddard, Sima, Adan, Heath, , Mario Garcia  Office # [phone]    Follow Up:  Palpitations, PSVT's, PAT's    Subjective/Observations: Seen awake and alert.  Comfortable on RA.  Denies CP or palpitations, SOB, SIMENTAL or orthopnea.  No significant tele events.  Admits to have been working herself up for every little thing    REVIEW OF SYSTEMS: All other review of systems is negative unless indicated above  PAST MEDICAL & SURGICAL HISTORY:  HTN - Hypertension  Hypercholesteremia  Atrial tachycardia  Hypothyroid  S/P Cataract Surgery  B/L  Liver cyst  removed    MEDICATIONS  (STANDING):  cloNIDine 0.1 milliGRAM(s) Oral two times a day  enoxaparin Injectable 30 milliGRAM(s) SubCutaneous every 24 hours  levothyroxine 25 MICROGram(s) Oral daily  multivitamin/minerals 1 Tablet(s) Oral daily  polyethylene glycol 3350 17 Gram(s) Oral daily  simvastatin 10 milliGRAM(s) Oral at bedtime  sotalol. 80 milliGRAM(s) Oral every 12 hours    MEDICATIONS  (PRN):  acetaminophen     Tablet .. 650 milliGRAM(s) Oral every 6 hours PRN Temp greater or equal to 38C (100.4F), Mild Pain (1 - 3)  melatonin 3 milliGRAM(s) Oral at bedtime PRN Insomnia    Allergies    penicillins (Unknown)    Intolerances    Vital Signs Last 24 Hrs  T(C): 36.8 (10 Bay 2024 04:34), Max: 36.8 (09 Jan 2024 21:16)  T(F): 98.3 (10 Bay 2024 04:34), Max: 98.3 (10 Bay 2024 04:34)  HR: 53 (10 Bay 2024 04:34) (53 - 92)  BP: 105/58 (10 Bay 2024 04:34) (105/58 - 188/92)  BP(mean): --  RR: 18 (10 Bay 2024 04:34) (17 - 18)  SpO2: 93% (10 Bay 2024 04:34) (93% - 96%)    Parameters below as of 10 Bay 2024 04:34  Patient On (Oxygen Delivery Method): room air    I&O's Summary    09 Jan 2024 07:01  -  10 Bay 2024 07:00  --------------------------------------------------------  IN: 200 mL / OUT: 1300 mL / NET: -1100 mL     PHYSICAL EXAM:  TELE: Sinus babar/NSR  Constitutional: NAD, awake and alert, well-developed  HEENT: Moist Mucous Membranes, Anicteric  Pulmonary: Non-labored, breath sounds are clear bilaterally, No wheezing, rales or rhonchi  Cardiovascular: Regular, S1 and S2, No murmurs, rubs, gallops or clicks  Gastrointestinal: Bowel Sounds present, soft, nontender.   Lymph: No peripheral edema. No lymphadenopathy.  Skin: No visible rashes or ulcers.  Psych:  Mood & affect appropriate  LABS: All Labs Reviewed:                        11.9   8.03  )-----------( 221      ( 09 Jan 2024 06:32 )             35.1     09 Jan 2024 06:32    132    |  103    |  25     ----------------------------<  103    4.2     |  26     |  0.85     Ca    8.2        09 Jan 2024 06:32            12 Lead ECG:   Ventricular Rate 54 BPM    Atrial Rate 54 BPM    P-R Interval 170 ms    QRS Duration 92 ms    Q-T Interval 472 ms    QTC Calculation(Bazett) 447 ms    P Axis 54 degrees    R Axis -60 degrees    T Axis 34 degrees    Diagnosis Line Sinus bradycardia with sinus arrhythmia  Left anterior fascicular block  Septal infarct , age undetermined  Abnormal ECG  When compared with ECG of 07-JAN-2024 10:25,  No significant change was found  Confirmed by ANIVAL LOPEZ ([id number]) on 1/9/2024 2:20:32 PM (01-09-24 @ 12:01)      Patient name: ROBERT BELLA  YOB: 1929   Age: 84 (F)   MR#: [id number]  Study Date: 4/2/2014  Location: O/PSonographer: Jesica Benjamin  Study quality: Technically good  Referring Physician: Chavo Whitlock MD  ------------------------------------------------------------------------  Blood Pressure: 163/80 mmHg  Height: 5ft 0in  Weight: 134 lb  BSA: 1.6 m2  ------------------------------------------------------------------------  PROCEDURE: Transthoracic echocardiogram with 2-D, M-Mode  and complete spectral and color flow Doppler.  INDICATION: Abnormal Echocardiogram/EKG (794.31)  ------------------------------------------------------------------------  DIMENSIONS:  Dimensions:     Normal Values:  LA:     2.6 cm    2.0 - 4.0 cm  Ao:     2.8 cm    2.0 - 3.8 cm  SEPTUM: 0.6 cm    0.6 - 1.2 cm  PWT:    0.6 cm    0.6 - 1.1 cm  LVIDd:  3.9 cm    3.0 - 5.6 cm  LVIDs:  1.8 cm    1.8 - 4.0 cm  Derived Variables:  LVMI: 39 g/m2  RWT: 0.30  Fractional short: 54 %  Ejection Fraction: 85 %  ------------------------------------------------------------------------  OBSERVATIONS:  Mitral Valve: Normal mitral valve.  Aortic Root: Normal aortic root.  Aortic Valve: Normal trileaflet aortic valve.  Left Atrium: Normal left atrium  Left Ventricle: Normal left ventricular internal dimensions  and wall thicknesses.  Hyperdynamic left ventricle.  Right Heart: Normal right atrium.  Normal right ventricular size and function.  Normal tricuspid valve. Moderate tricuspid regurgitation.  Normal pulmonic valve.  Estimated pulmonary artery systolic pressure equals 43 mm  Hg, assuming right atrial pressure equals 10  mm Hg,  consistent with mild pulmonary hypertension.  Pericardium/PleuraNormal pericardium with no pericardial  effusion.  Large cystic structure noted within the liver of unclear  etiology. Recommend dedicated sonography to evaluate.  ------------------------------------------------------------------------  CONCLUSIONS:  1. Normal left ventricular internal dimensions and wall  thicknesses.  2. Hyperdynamic left ventricle.  ------------------------------------------------------------------------  Confirmed on  4/2/2014 - 14:17:06 by Jesus Crain M.D.  ------------------------------------------------------------------------

## 2024-01-10 NOTE — PHYSICAL THERAPY INITIAL EVALUATION ADULT - IMPAIRED TRANSFERS: SIT/STAND, REHAB EVAL
+increased thoracic kyphosis/lateral right spinal curvature/decreased flexibility/impaired postural control/decreased strength

## 2024-01-10 NOTE — PHYSICAL THERAPY INITIAL EVALUATION ADULT - [id number]_GEN_A_CORE
The patient has a mobility limitation that significantly impairs their ability to participate in one or more mobility related activities of daily living within the home.  By utilizing a rolling walker the functional mobility deficit can be sufficiently resolved.  The patient demonstrates safe use of rolling walker.  Pt notes she has a rolling walker at home./rolling walker

## 2024-01-10 NOTE — OCCUPATIONAL THERAPY INITIAL EVALUATION ADULT - GENERAL OBSERVATIONS, REHAB EVAL
Patient found seated in bedside chair wearing progressive eyeglasses with +IV lock, +telemonitor and +external catheter. Patient chart reviewed, events noted. Patient cleared to be seen for therapy by RN prior to session. Dtsandor Akers present bedside.

## 2024-01-11 ENCOUNTER — TRANSCRIPTION ENCOUNTER (OUTPATIENT)
Age: 89
End: 2024-01-11

## 2024-01-11 LAB
ANION GAP SERPL CALC-SCNC: 7 MMOL/L — SIGNIFICANT CHANGE UP (ref 5–17)
ANION GAP SERPL CALC-SCNC: 7 MMOL/L — SIGNIFICANT CHANGE UP (ref 5–17)
BUN SERPL-MCNC: 25 MG/DL — HIGH (ref 7–23)
BUN SERPL-MCNC: 25 MG/DL — HIGH (ref 7–23)
CALCIUM SERPL-MCNC: 8.6 MG/DL — SIGNIFICANT CHANGE UP (ref 8.5–10.1)
CALCIUM SERPL-MCNC: 8.6 MG/DL — SIGNIFICANT CHANGE UP (ref 8.5–10.1)
CHLORIDE SERPL-SCNC: 102 MMOL/L — SIGNIFICANT CHANGE UP (ref 96–108)
CHLORIDE SERPL-SCNC: 102 MMOL/L — SIGNIFICANT CHANGE UP (ref 96–108)
CO2 SERPL-SCNC: 23 MMOL/L — SIGNIFICANT CHANGE UP (ref 22–31)
CO2 SERPL-SCNC: 23 MMOL/L — SIGNIFICANT CHANGE UP (ref 22–31)
CREAT SERPL-MCNC: 0.97 MG/DL — SIGNIFICANT CHANGE UP (ref 0.5–1.3)
CREAT SERPL-MCNC: 0.97 MG/DL — SIGNIFICANT CHANGE UP (ref 0.5–1.3)
EGFR: 54 ML/MIN/1.73M2 — LOW
EGFR: 54 ML/MIN/1.73M2 — LOW
GLUCOSE SERPL-MCNC: 133 MG/DL — HIGH (ref 70–99)
GLUCOSE SERPL-MCNC: 133 MG/DL — HIGH (ref 70–99)
MAGNESIUM SERPL-MCNC: 2.3 MG/DL — SIGNIFICANT CHANGE UP (ref 1.6–2.6)
MAGNESIUM SERPL-MCNC: 2.3 MG/DL — SIGNIFICANT CHANGE UP (ref 1.6–2.6)
PHOSPHATE SERPL-MCNC: 3.6 MG/DL — SIGNIFICANT CHANGE UP (ref 2.5–4.5)
PHOSPHATE SERPL-MCNC: 3.6 MG/DL — SIGNIFICANT CHANGE UP (ref 2.5–4.5)
POTASSIUM SERPL-MCNC: 5.1 MMOL/L — SIGNIFICANT CHANGE UP (ref 3.5–5.3)
POTASSIUM SERPL-MCNC: 5.1 MMOL/L — SIGNIFICANT CHANGE UP (ref 3.5–5.3)
POTASSIUM SERPL-SCNC: 5.1 MMOL/L — SIGNIFICANT CHANGE UP (ref 3.5–5.3)
POTASSIUM SERPL-SCNC: 5.1 MMOL/L — SIGNIFICANT CHANGE UP (ref 3.5–5.3)
SODIUM SERPL-SCNC: 132 MMOL/L — LOW (ref 135–145)
SODIUM SERPL-SCNC: 132 MMOL/L — LOW (ref 135–145)

## 2024-01-11 PROCEDURE — 93010 ELECTROCARDIOGRAM REPORT: CPT

## 2024-01-11 PROCEDURE — 99233 SBSQ HOSP IP/OBS HIGH 50: CPT

## 2024-01-11 PROCEDURE — 93306 TTE W/DOPPLER COMPLETE: CPT | Mod: 26

## 2024-01-11 PROCEDURE — 99233 SBSQ HOSP IP/OBS HIGH 50: CPT | Mod: GC

## 2024-01-11 RX ORDER — SODIUM CHLORIDE 9 MG/ML
1 INJECTION INTRAMUSCULAR; INTRAVENOUS; SUBCUTANEOUS
Refills: 0 | Status: COMPLETED | OUTPATIENT
Start: 2024-01-11 | End: 2024-01-12

## 2024-01-11 RX ORDER — LISINOPRIL 2.5 MG/1
20 TABLET ORAL
Refills: 0 | Status: DISCONTINUED | OUTPATIENT
Start: 2024-01-11 | End: 2024-01-13

## 2024-01-11 RX ADMIN — Medication 40 MILLIGRAM(S): at 05:25

## 2024-01-11 RX ADMIN — ENOXAPARIN SODIUM 30 MILLIGRAM(S): 100 INJECTION SUBCUTANEOUS at 05:25

## 2024-01-11 RX ADMIN — SODIUM CHLORIDE 1 GRAM(S): 9 INJECTION INTRAMUSCULAR; INTRAVENOUS; SUBCUTANEOUS at 17:45

## 2024-01-11 RX ADMIN — Medication 650 MILLIGRAM(S): at 23:43

## 2024-01-11 RX ADMIN — POLYETHYLENE GLYCOL 3350 17 GRAM(S): 17 POWDER, FOR SOLUTION ORAL at 12:59

## 2024-01-11 RX ADMIN — Medication 40 MILLIGRAM(S): at 17:45

## 2024-01-11 RX ADMIN — SIMVASTATIN 10 MILLIGRAM(S): 20 TABLET, FILM COATED ORAL at 21:06

## 2024-01-11 RX ADMIN — Medication 1 TABLET(S): at 12:59

## 2024-01-11 RX ADMIN — Medication 25 MICROGRAM(S): at 05:25

## 2024-01-11 RX ADMIN — LISINOPRIL 20 MILLIGRAM(S): 2.5 TABLET ORAL at 05:25

## 2024-01-11 RX ADMIN — LISINOPRIL 20 MILLIGRAM(S): 2.5 TABLET ORAL at 17:45

## 2024-01-11 NOTE — PROGRESS NOTE ADULT - ASSESSMENT
89 yo female w/ pmh of Atrial Tachycardia, Htn, Hld, Hypothyrodism, anxiety presents with 3 days of intermittent palpitations.    Palpitations/PSVT/PAT  - Presented with symptomatic PSVT's  and PAT's  - No further atrial arrhythmias on tele  - Maintaining SBon tele at 50s  - Continue Sotalol 40mg q12 hrs  - Continue off home Cardizem and Toprol dc'ed.   - EKG with baseline , QTC wnl 1/9  - Unable to utilize Amio due to dizziness  - Continue statin    - No signs of significant ischemia or volume overload.   - EKG nonischemic  - Appears compensated from HF POV.  Non-orthopneic on RA  - TTE pending    - Bp elevated  - increase lisinopril to 40mg  - Clonidine dc'ed in the setting of possibly contributing to her bradycardia    - Monitor and replete lytes, keep K>4, Mg>2.  - Will continue to follow.    Marti Jones NP  Nurse Practitioner- Cardiology   Call TEAMS 89 yo female w/ pmh of Atrial Tachycardia, Htn, Hld, Hypothyrodism, anxiety presents with 3 days of intermittent palpitations.    Palpitations/PSVT/PAT  - Presented with symptomatic PSVT's  and PAT's  - No further atrial arrhythmias on tele  - Maintaining SBon tele at 50s  - Continue Sotalol 40mg q12 hrs  - Continue off home Cardizem and Toprol dc'ed.   - EKG with baseline , QTC wnl 1/9  - Unable to utilize Amio due to dizziness  - Continue statin    - No signs of significant ischemia or volume overload.   - EKG nonischemic  - Appears compensated from HF POV.  Non-orthopneic on RA  - TTE pending    - Bp elevated  - increase lisinopril to 40mg  - Clonidine dc'ed in the setting of possibly contributing to her bradycardia    - Monitor and replete lytes, keep K>4, Mg>2.  - Will continue to follow.    Marti Jones NP  Nurse Practitioner- Cardiology   Call TEAMS

## 2024-01-11 NOTE — DISCHARGE NOTE PROVIDER - HOSPITAL COURSE
HPI:  89 yo female w/ pmh of Atrial Tachycardia, Htn, Hld, Hypothyrodism, anxiety presents with 3 days of intermittent palpitations. pt states that she started to feel palpitations this past Thursday without any other associated sxs. She first thought the palpitations were due to her anxiety so she took one Xanax she has prescribed for anxiety prn which never relieved the palpitations States she called her outpatient cardiologist and left a message with the nurse on staff. States the palpitations come and go intermittently throughout each day. The patient was then told from outpatient cardio the next day to take one extra metoprolol tartrate 50mg in the middle of the day to help relieve her sxs which she believes does help, but they keep coming back. States that she took an extra dose of metoprolol yesterday at noon and today at noon but has not taken her scheduled evening dose today. Denies dizziness, chest pain, arm pain, jaw pain, n/v/d/c, abd pain, sob, lightheadedness.      ED course  Vitals: T 98.2, HR 59, /79, 184/72, RR 16, SpO2 98% on RA  Significant labs: Na 133, BUN/Cr 26/.92, Gluc 119, proBNP 1086 Negative: Trop, TSH, CK, CKMB,   Imaging: CXR: Negative for acute parenchymal disease  CTA: Negative for PE. No airspace consolidation  EKG: NSR @ 117bpm  In ED patient given: 500ml NS Bolus   (06 Jan 2024 22:28)      ---  HOSPITAL COURSE: Patient admitted to medicine floor for management of palpitations. Pt HRs were labile between 50s-100s.     Pt seen and examined on day of discharge. Patient is medically optimized for discharge to home with close outpatient followup.    PHYSICAL EXAM ON DAY OF DISCHARGE:  The patient was seen and examined on the day of discharge. Please see progress note from day of discharge for further information.         ---  CONSULTANTS:   Cardio Dr. Joe  ---  TIME SPENT:  I, the attending physician, was physically present for the key portions of the evaluation and management (E/M) service provided. The total amount of time spent reviewing the hospital notes, laboratory values, imaging findings, assessing/counseling the patient, discussing with consultant physicians, social work, nursing staff was -- minutes    ---  Primary care provider was made aware of plan for discharge:      [  ] NO     [  ] YES   HPI:  89 yo female w/ pmh of Atrial Tachycardia, Htn, Hld, Hypothyrodism, anxiety presents with 3 days of intermittent palpitations. pt states that she started to feel palpitations this past Thursday without any other associated sxs. She first thought the palpitations were due to her anxiety so she took one Xanax she has prescribed for anxiety prn which never relieved the palpitations States she called her outpatient cardiologist and left a message with the nurse on staff. States the palpitations come and go intermittently throughout each day. The patient was then told from outpatient cardio the next day to take one extra metoprolol tartrate 50mg in the middle of the day to help relieve her sxs which she believes does help, but they keep coming back. States that she took an extra dose of metoprolol yesterday at noon and today at noon but has not taken her scheduled evening dose today. Denies dizziness, chest pain, arm pain, jaw pain, n/v/d/c, abd pain, sob, lightheadedness.      ED course  Vitals: T 98.2, HR 59, /79, 184/72, RR 16, SpO2 98% on RA  Significant labs: Na 133, BUN/Cr 26/.92, Gluc 119, proBNP 1086 Negative: Trop, TSH, CK, CKMB,   Imaging: CXR: Negative for acute parenchymal disease  CTA: Negative for PE. No airspace consolidation  EKG: NSR @ 117bpm  In ED patient given: 500ml NS Bolus   (06 Jan 2024 22:28)      ---  HOSPITAL COURSE: Patient admitted to medicine floor for management of palpitations. Pt HRs were labile between 50s-100s. EKG showed NSR @ 117bpm on admission. Pt's 	Cardizem and Toprol home meds were held. Home clonidine was held for concern it was contributing to bradycardia. Pt did not tolerate Hydralazine as pt's systolic dropped significantly. Pt has not tolerated Amio in past. Thus, Sotalol was started. Serial EKGs were ordered. EKG on day of discharge showed Sinus bradycardia, L. anterior fasicular block, LVH? and QTC of 415.___ TTE____?     Pt seen and examined on day of discharge. Patient is medically optimized for discharge to home with close outpatient followup.    PHYSICAL EXAM ON DAY OF DISCHARGE:  The patient was seen and examined on the day of discharge. Please see progress note from day of discharge for further information.   T(C): 36.7 (01-11-24 @ 05:32), Max: 36.8 (01-10-24 @ 11:42)  HR: 62 (01-11-24 @ 10:15) (52 - 94)  BP: 157/77 (01-11-24 @ 05:32) (157/77 - 170/92)  RR: 18 (01-11-24 @ 05:32) (16 - 18)  SpO2: 96% (01-11-24 @ 10:15) (95% - 96%)    GENERAL: patient appears well, no acute distress, appropriately interactive  HEENT: NC/AT, EOMI.   LUNGS: good air entry bilaterally, clear to auscultation, symmetric breath sounds, no wheezing or rhonchi appreciated  HEART: soft S1/S2, bradycardic.   ABDOMEN: soft, nontender, nondistended, normoactive bowel sounds  INTEGUMENT: good skin turgor, warm skin, appears well perfused  MUSCULOSKELETAL: no clubbing or cyanosis, no obvious deformity  NEUROLOGIC: awake, alert, oriented x3.      ---  CONSULTANTS:   Cardio Dr Gao    ---  TIME SPENT:  I, the attending physician, was physically present for the key portions of the evaluation and management (E/M) service provided. The total amount of time spent reviewing the hospital notes, laboratory values, imaging findings, assessing/counseling the patient, discussing with consultant physicians, social work, nursing staff was -- minutes    ---  Primary care provider was made aware of plan for discharge:      [  ] NO     [  ] YES   HPI:  89 yo female w/ pmh of Atrial Tachycardia, Htn, Hld, Hypothyrodism, anxiety presents with 3 days of intermittent palpitations. pt states that she started to feel palpitations this past Thursday without any other associated sxs. She first thought the palpitations were due to her anxiety so she took one Xanax she has prescribed for anxiety prn which never relieved the palpitations States she called her outpatient cardiologist and left a message with the nurse on staff. States the palpitations come and go intermittently throughout each day. The patient was then told from outpatient cardio the next day to take one extra metoprolol tartrate 50mg in the middle of the day to help relieve her sxs which she believes does help, but they keep coming back. States that she took an extra dose of metoprolol yesterday at noon and today at noon but has not taken her scheduled evening dose today. Denies dizziness, chest pain, arm pain, jaw pain, n/v/d/c, abd pain, sob, lightheadedness.      ED course  Vitals: T 98.2, HR 59, /79, 184/72, RR 16, SpO2 98% on RA  Significant labs: Na 133, BUN/Cr 26/.92, Gluc 119, proBNP 1086 Negative: Trop, TSH, CK, CKMB,   Imaging: CXR: Negative for acute parenchymal disease  CTA: Negative for PE. No airspace consolidation  EKG: NSR @ 117bpm  In ED patient given: 500ml NS Bolus   (06 Jan 2024 22:28)      ---  HOSPITAL COURSE: Patient admitted to medicine floor for management of palpitations. Pt HRs were labile between 50s-100s. EKG showed NSR @ 117bpm on admission. Pt's 	Cardizem and Toprol home meds were held. Home clonidine was held for concern it was contributing to bradycardia. Pt did not tolerate Hydralazine as pt's systolic dropped significantly. Pt has not tolerated Amio in past. Thus, Sotalol was started. Amlodipine 5mg was started. Serial EKGs were ordered. EKG on day of discharge showed Sinus bradycardia, Incomplete right bundle branch block, Left anterior fascicular block and Septal infarct , age undetermined. TTE: normal systolic function with LVEF of 68%     Pt seen and examined on day of discharge. Patient is medically optimized for discharge to home with close outpatient followup.    PHYSICAL EXAM ON DAY OF DISCHARGE:  The patient was seen and examined on the day of discharge. Please see progress note from day of discharge for further information.   T(C): 36.6 (01-13-24 @ 09:44), Max: 36.8 (01-12-24 @ 21:35)  HR: 61 (01-13-24 @ 09:44) (56 - 98)  BP: 116/74 (01-13-24 @ 11:08) (103/70 - 169/96)  RR: 18 (01-13-24 @ 09:44) (18 - 18)  SpO2: 96% (01-13-24 @ 09:44) (92% - 96%)    PHYSICAL EXAM:  GENERAL: NAD, reading book in chair comfortably  HEENT: NC/AT,  anicteric, EOMI, moist mucous membranes  CHEST/LUNG: Lungs CTA BL  HEART:  +s1, s2. RRR.  ABDOMEN:  BS+, soft, nontender, nondistended  MUSCULOSKELETAL: no lower extremity edema, cyanosis, or calf tenderness  NEUROLOGIC: AAOX3; answers questions and follows commands appropriately  PSYCH: Appropriate mood and affect      ---  CONSULTANTS:   Cardio Dr Gao    ---  TIME SPENT:  I, the attending physician, was physically present for the key portions of the evaluation and management (E/M) service provided. The total amount of time spent reviewing the hospital notes, laboratory values, imaging findings, assessing/counseling the patient, discussing with consultant physicians, social work, nursing staff was -- minutes    ---  Primary care provider was made aware of plan for discharge:      [  ] NO     [  ] YES   HPI:  89 yo female w/ pmh of Atrial Tachycardia, Htn, Hld, Hypothyrodism, anxiety presents with 3 days of intermittent palpitations. pt states that she started to feel palpitations this past Thursday without any other associated sxs. She first thought the palpitations were due to her anxiety so she took one Xanax she has prescribed for anxiety prn which never relieved the palpitations States she called her outpatient cardiologist and left a message with the nurse on staff. States the palpitations come and go intermittently throughout each day. The patient was then told from outpatient cardio the next day to take one extra metoprolol tartrate 50mg in the middle of the day to help relieve her sxs which she believes does help, but they keep coming back. States that she took an extra dose of metoprolol yesterday at noon and today at noon but has not taken her scheduled evening dose today. Denies dizziness, chest pain, arm pain, jaw pain, n/v/d/c, abd pain, sob, lightheadedness.      ED course  Vitals: T 98.2, HR 59, /79, 184/72, RR 16, SpO2 98% on RA  Significant labs: Na 133, BUN/Cr 26/.92, Gluc 119, proBNP 1086 Negative: Trop, TSH, CK, CKMB,   Imaging: CXR: Negative for acute parenchymal disease  CTA: Negative for PE. No airspace consolidation  EKG: NSR @ 117bpm  In ED patient given: 500ml NS Bolus   (06 Jan 2024 22:28)      ---  HOSPITAL COURSE: Patient admitted to medicine floor for management of palpitations. Pt HRs were labile between 50s-100s. EKG showed NSR @ 117bpm on admission. Pt's 	Cardizem and Toprol home meds were held. Home clonidine was held for concern it was contributing to bradycardia. Pt did not tolerate Hydralazine as pt's systolic dropped significantly. Pt has not tolerated Amio in past. Thus, Sotalol was started. Amlodipine 5mg was started. Serial EKGs were ordered. EKG on day of discharge showed Sinus bradycardia, Incomplete right bundle branch block, Left anterior fascicular block and Septal infarct , age undetermined. TTE: normal systolic function with LVEF of 68%     Pt seen and examined on day of discharge. Patient is medically optimized for discharge to home with close outpatient followup.    PHYSICAL EXAM ON DAY OF DISCHARGE:  The patient was seen and examined on the day of discharge. Please see progress note from day of discharge for further information.   T(C): 36.6 (01-13-24 @ 09:44), Max: 36.8 (01-12-24 @ 21:35)  HR: 61 (01-13-24 @ 09:44) (56 - 98)  BP: 116/74 (01-13-24 @ 11:08) (103/70 - 169/96)  RR: 18 (01-13-24 @ 09:44) (18 - 18)  SpO2: 96% (01-13-24 @ 09:44) (92% - 96%)    PHYSICAL EXAM:  GENERAL: NAD, reading book in chair comfortably  HEENT: NC/AT,  anicteric, EOMI, moist mucous membranes  CHEST/LUNG: Lungs CTA BL  HEART:  +s1, s2. RRR.  ABDOMEN:  BS+, soft, nontender, nondistended  MUSCULOSKELETAL: no lower extremity edema, cyanosis, or calf tenderness  NEUROLOGIC: AAOX3; answers questions and follows commands appropriately  PSYCH: Appropriate mood and affect      ---  CONSULTANTS:   Cardio Dr Gao    ---  TIME SPENT:  I, the attending physician, was physically present for the key portions of the evaluation and management (E/M) service provided. The total amount of time spent reviewing the hospital notes, laboratory values, imaging findings, assessing/counseling the patient, discussing with consultant physicians, social work, nursing staff was -- minutes    ---  Primary care provider was made aware of plan for discharge:      [  ] NO     [  ] YES   HPI:  89 yo female w/ pmh of Atrial Tachycardia, Htn, Hld, Hypothyroidism, anxiety presents with 3 days of intermittent palpitations. pt states that she started to feel palpitations this past Thursday without any other associated sxs. She first thought the palpitations were due to her anxiety so she took one Xanax she has prescribed for anxiety prn which never relieved the palpitations States she called her outpatient cardiologist and left a message with the nurse on staff. States the palpitations come and go intermittently throughout each day. The patient was then told from outpatient cardio the next day to take one extra metoprolol tartrate 50mg in the middle of the day to help relieve her sxs which she believes does help, but they keep coming back. States that she took an extra dose of metoprolol yesterday at noon and today at noon but has not taken her scheduled evening dose today. Denies dizziness, chest pain, arm pain, jaw pain, n/v/d/c, abd pain, sob, lightheadedness.      ED course  Vitals: T 98.2, HR 59, /79, 184/72, RR 16, SpO2 98% on RA  Significant labs: Na 133, BUN/Cr 26/.92, Gluc 119, proBNP 1086 Negative: Trop, TSH, CK, CKMB,   Imaging: CXR: Negative for acute parenchymal disease  CTA: Negative for PE. No airspace consolidation  EKG: NSR @ 117bpm  In ED patient given: 500ml NS Bolus   (06 Jan 2024 22:28)      ---  HOSPITAL COURSE: Patient admitted to medicine floor for management of palpitations. Pt HRs were labile between 50s-100s. EKG showed NSR @ 117bpm on admission. Pt's 	Cardizem and Toprol home meds were held. Home clonidine was held for concern it was contributing to bradycardia. Pt did not tolerate Hydralazine as pt's systolic dropped significantly. Pt has not tolerated Amio in past. Thus, Sotalol was started. Amlodipine 5mg was started. Serial EKGs were ordered. EKG on day of discharge showed Sinus bradycardia, Incomplete right bundle branch block, Left anterior fascicular block and Septal infarct , age undetermined. TTE: normal systolic function with LVEF of 68%     Pt seen and examined on day of discharge. Patient is medically optimized for discharge to home with close outpatient followup.    PHYSICAL EXAM ON DAY OF DISCHARGE:  The patient was seen and examined on the day of discharge. Please see progress note from day of discharge for further information.   T(C): 36.6 (01-13-24 @ 09:44), Max: 36.8 (01-12-24 @ 21:35)  HR: 61 (01-13-24 @ 09:44) (56 - 98)  BP: 116/74 (01-13-24 @ 11:08) (103/70 - 169/96)  RR: 18 (01-13-24 @ 09:44) (18 - 18)  SpO2: 96% (01-13-24 @ 09:44) (92% - 96%)    PHYSICAL EXAM:  GENERAL: NAD, reading book in chair comfortably  HEENT: NC/AT,  anicteric, EOMI, moist mucous membranes  CHEST/LUNG: Lungs CTA BL  HEART:  +s1, s2. RRR.  ABDOMEN:  BS+, soft, nontender, nondistended  MUSCULOSKELETAL: no lower extremity edema, cyanosis, or calf tenderness  NEUROLOGIC: AAOX3; answers questions and follows commands appropriately  PSYCH: Appropriate mood and affect      ---  CONSULTANTS:   Cardio Dr Gao    ---  TIME SPENT:  I, the attending physician, was physically present for the key portions of the evaluation and management (E/M) service provided. The total amount of time spent reviewing the hospital notes, laboratory values, imaging findings, assessing/counseling the patient, discussing with consultant physicians, social work, nursing staff was -- minutes    ---  Primary care provider was made aware of plan for discharge:      [  ] NO     [  ] YES   HPI:  89 yo female w/ pmh of Atrial Tachycardia, Htn, Hld, Hypothyroidism, anxiety presents with 3 days of intermittent palpitations. pt states that she started to feel palpitations this past Thursday without any other associated sxs. She first thought the palpitations were due to her anxiety so she took one Xanax she has prescribed for anxiety prn which never relieved the palpitations States she called her outpatient cardiologist and left a message with the nurse on staff. States the palpitations come and go intermittently throughout each day. The patient was then told from outpatient cardio the next day to take one extra metoprolol tartrate 50mg in the middle of the day to help relieve her sxs which she believes does help, but they keep coming back. States that she took an extra dose of metoprolol yesterday at noon and today at noon but has not taken her scheduled evening dose today. Denies dizziness, chest pain, arm pain, jaw pain, n/v/d/c, abd pain, sob, lightheadedness.      ED course  Vitals: T 98.2, HR 59, /79, 184/72, RR 16, SpO2 98% on RA  Significant labs: Na 133, BUN/Cr 26/.92, Gluc 119, proBNP 1086 Negative: Trop, TSH, CK, CKMB,   Imaging: CXR: Negative for acute parenchymal disease  CTA: Negative for PE. No airspace consolidation  EKG: NSR @ 117bpm  In ED patient given: 500ml NS Bolus   (06 Jan 2024 22:28)      ---  HOSPITAL COURSE: Patient admitted to medicine floor for management of palpitations. Pt HRs were labile between 50s-100s. EKG showed NSR @ 117bpm on admission. Pt's 	Cardizem and Toprol home meds were held. Home clonidine was held for concern it was contributing to bradycardia. Pt did not tolerate Hydralazine as pt's systolic dropped significantly. Pt has not tolerated Amio in past. Thus, Sotalol was started. Amlodipine 5mg was started. Serial EKGs were ordered. EKG on day of discharge showed Sinus bradycardia, Incomplete right bundle branch block, Left anterior fascicular block and Septal infarct , age undetermined. TTE: normal systolic function with LVEF of 68%     Pt seen and examined on day of discharge. Patient is medically optimized for discharge to home with close outpatient followup.    PHYSICAL EXAM ON DAY OF DISCHARGE:  The patient was seen and examined on the day of discharge. Please see progress note from day of discharge for further information.   T(C): 36.6 (01-13-24 @ 09:44), Max: 36.8 (01-12-24 @ 21:35)  HR: 61 (01-13-24 @ 09:44) (56 - 98)  BP: 116/74 (01-13-24 @ 11:08) (103/70 - 169/96)  RR: 18 (01-13-24 @ 09:44) (18 - 18)  SpO2: 96% (01-13-24 @ 09:44) (92% - 96%)    PHYSICAL EXAM:  GENERAL: NAD, reading book in chair comfortably  HEENT: NC/AT,  anicteric, EOMI, moist mucous membranes  CHEST/LUNG: Lungs CTA BL  HEART:  +s1, s2. RRR.  ABDOMEN:  BS+, soft, nontender, nondistended  MUSCULOSKELETAL: no lower extremity edema, cyanosis, or calf tenderness  NEUROLOGIC: AAOX3; answers questions and follows commands appropriately  PSYCH: Appropriate mood and affect      ---  CONSULTANTS:   Cardio Dr Gao    ---  TIME SPENT:  I, the attending physician, was physically present for the key portions of the evaluation and management (E/M) service provided. The total amount of time spent reviewing the hospital notes, laboratory values, imaging findings, assessing/counseling the patient, discussing with consultant physicians, social work, nursing staff was -- minutes    ---  Primary care provider was made aware of plan for discharge:      [  ] NO     [  ] YES   HPI:  89 yo female w/ pmh of Atrial Tachycardia, Htn, Hld, Hypothyroidism, anxiety presents with 3 days of intermittent palpitations. pt states that she started to feel palpitations this past Thursday without any other associated sxs. She first thought the palpitations were due to her anxiety so she took one Xanax she has prescribed for anxiety prn which never relieved the palpitations States she called her outpatient cardiologist and left a message with the nurse on staff. States the palpitations come and go intermittently throughout each day. The patient was then told from outpatient cardio the next day to take one extra metoprolol tartrate 50mg in the middle of the day to help relieve her sxs which she believes does help, but they keep coming back. States that she took an extra dose of metoprolol yesterday at noon and today at noon but has not taken her scheduled evening dose today. Denies dizziness, chest pain, arm pain, jaw pain, n/v/d/c, abd pain, sob, lightheadedness.      ED course  Vitals: T 98.2, HR 59, /79, 184/72, RR 16, SpO2 98% on RA  Significant labs: Na 133, BUN/Cr 26/.92, Gluc 119, proBNP 1086 Negative: Trop, TSH, CK, CKMB,   Imaging: CXR: Negative for acute parenchymal disease  CTA: Negative for PE. No airspace consolidation  EKG: NSR @ 117bpm  In ED patient given: 500ml NS Bolus   (06 Jan 2024 22:28)      ---  HOSPITAL COURSE: Patient admitted to medicine floor for management of palpitations. Pt HRs were labile between 50s-100s. EKG showed NSR @ 117bpm on admission. Pt's 	Cardizem and Toprol home meds were held. Home clonidine was held for concern it was contributing to bradycardia. Lisnopril was continued. Pt did not tolerate Hydralazine as pt's systolic dropped significantly. Pt has not tolerated Amio in past. Thus, Sotalol was started. Serial EKGs were ordered. EKG on day of discharge showed Sinus bradycardia, Incomplete right bundle branch block, Left anterior fascicular block and Septal infarct , age undetermined. TTE: normal systolic function with LVEF of 68%.     Pt seen and examined on day of discharge. Patient is medically optimized for discharge to home with close outpatient followup.    PHYSICAL EXAM ON DAY OF DISCHARGE:  The patient was seen and examined on the day of discharge. Please see progress note from day of discharge for further information.   T(C): 36.6 (01-13-24 @ 09:44), Max: 36.8 (01-12-24 @ 21:35)  HR: 61 (01-13-24 @ 09:44) (56 - 98)  BP: 116/74 (01-13-24 @ 11:08) (103/70 - 169/96)  RR: 18 (01-13-24 @ 09:44) (18 - 18)  SpO2: 96% (01-13-24 @ 09:44) (92% - 96%)    PHYSICAL EXAM:  GENERAL: NAD, reading book in chair comfortably  HEENT: NC/AT,  anicteric, EOMI, moist mucous membranes  CHEST/LUNG: Lungs CTA BL  HEART:  +s1, s2. RRR.  ABDOMEN:  BS+, soft, nontender, nondistended  MUSCULOSKELETAL: no lower extremity edema, cyanosis, or calf tenderness  NEUROLOGIC: AAOX3; answers questions and follows commands appropriately  PSYCH: Appropriate mood and affect      ---  CONSULTANTS:   Cardio Dr Gao    ---  TIME SPENT:  I, the attending physician, was physically present for the key portions of the evaluation and management (E/M) service provided. The total amount of time spent reviewing the hospital notes, laboratory values, imaging findings, assessing/counseling the patient, discussing with consultant physicians, social work, nursing staff was -- minutes    ---  Primary care provider was made aware of plan for discharge:      [  ] NO     [  ] YES   HPI:  89 yo female w/ pmh of Atrial Tachycardia, Htn, Hld, Hypothyroidism, anxiety presents with 3 days of intermittent palpitations. pt states that she started to feel palpitations this past Thursday without any other associated sxs. She first thought the palpitations were due to her anxiety so she took one Xanax she has prescribed for anxiety prn which never relieved the palpitations States she called her outpatient cardiologist and left a message with the nurse on staff. States the palpitations come and go intermittently throughout each day. The patient was then told from outpatient cardio the next day to take one extra metoprolol tartrate 50mg in the middle of the day to help relieve her sxs which she believes does help, but they keep coming back. States that she took an extra dose of metoprolol yesterday at noon and today at noon but has not taken her scheduled evening dose today. Denies dizziness, chest pain, arm pain, jaw pain, n/v/d/c, abd pain, sob, lightheadedness.      ED course  Vitals: T 98.2, HR 59, /79, 184/72, RR 16, SpO2 98% on RA  Significant labs: Na 133, BUN/Cr 26/.92, Gluc 119, proBNP 1086 Negative: Trop, TSH, CK, CKMB,   Imaging: CXR: Negative for acute parenchymal disease  CTA: Negative for PE. No airspace consolidation  EKG: NSR @ 117bpm  In ED patient given: 500ml NS Bolus   (06 Jan 2024 22:28)      ---  HOSPITAL COURSE: Patient admitted to medicine floor for management of palpitations. Pt HRs were labile between 50s-100s. EKG showed NSR @ 117bpm on admission. Pt's 	Cardizem and Toprol home meds were held. Home clonidine was held for concern it was contributing to bradycardia. Lisnopril was continued. Pt did not tolerate Hydralazine as pt's systolic dropped significantly. Pt has not tolerated Amio in past. Thus, Sotalol was started. Serial EKGs were ordered. EKG on day of discharge showed Sinus bradycardia, Incomplete right bundle branch block, Left anterior fascicular block and Septal infarct , age undetermined. TTE: normal systolic function with LVEF of 68%.     Pt seen and examined on day of discharge. Patient is medically optimized for discharge to home with close outpatient followup.    PHYSICAL EXAM ON DAY OF DISCHARGE:  The patient was seen and examined on the day of discharge. Please see progress note from day of discharge for further information.   T(C): 36.6 (01-13-24 @ 09:44), Max: 36.8 (01-12-24 @ 21:35)  HR: 61 (01-13-24 @ 09:44) (56 - 98)  BP: 116/74 (01-13-24 @ 11:08) (103/70 - 169/96)  RR: 18 (01-13-24 @ 09:44) (18 - 18)  SpO2: 96% (01-13-24 @ 09:44) (92% - 96%)    PHYSICAL EXAM:  GENERAL: NAD, reading book in chair comfortably  HEENT: NC/AT,  anicteric, EOMI, moist mucous membranes  CHEST/LUNG: Lungs CTA BL  HEART:  +s1, s2. RRR.  ABDOMEN:  BS+, soft, nontender, nondistended  MUSCULOSKELETAL: no lower extremity edema, cyanosis, or calf tenderness  NEUROLOGIC: AAOX3; answers questions and follows commands appropriately  PSYCH: Appropriate mood and affect      ---  CONSULTANTS:   Cardio Dr Gao    ---  TIME SPENT:  I, the attending physician, was physically present for the key portions of the evaluation and management (E/M) service provided. The total amount of time spent reviewing the hospital notes, laboratory values, imaging findings, assessing/counseling the patient, discussing with consultant physicians, social work, nursing staff was 50 minutes   HPI:  89 yo female w/ pmh of Atrial Tachycardia, Htn, Hld, Hypothyroidism, anxiety presents with 3 days of intermittent palpitations. pt states that she started to feel palpitations this past Thursday without any other associated sxs. She first thought the palpitations were due to her anxiety so she took one Xanax she has prescribed for anxiety prn which never relieved the palpitations States she called her outpatient cardiologist and left a message with the nurse on staff. States the palpitations come and go intermittently throughout each day. The patient was then told from outpatient cardio the next day to take one extra metoprolol tartrate 50mg in the middle of the day to help relieve her sxs which she believes does help, but they keep coming back. States that she took an extra dose of metoprolol yesterday at noon and today at noon but has not taken her scheduled evening dose today. Denies dizziness, chest pain, arm pain, jaw pain, n/v/d/c, abd pain, sob, lightheadedness.      ED course  Vitals: T 98.2, HR 59, /79, 184/72, RR 16, SpO2 98% on RA  Significant labs: Na 133, BUN/Cr 26/.92, Gluc 119, proBNP 1086 Negative: Trop, TSH, CK, CKMB,   Imaging: CXR: Negative for acute parenchymal disease  CTA: Negative for PE. No airspace consolidation  EKG: NSR @ 117bpm  In ED patient given: 500ml NS Bolus   (06 Jan 2024 22:28)      ---  HOSPITAL COURSE: Patient admitted to medicine floor for management of palpitations. Pt HRs were labile between 50s-100s. EKG showed NSR @ 117bpm on admission. Pt's 	Cardizem and Toprol home meds were held. Home clonidine was held for concern it was contributing to bradycardia. Lisnopril was continued. Pt did not tolerate Hydralazine as pt's systolic dropped significantly. Pt has not tolerated Amio in past. Thus, Sotalol was started. Serial EKGs were ordered. EKG on day of discharge showed Sinus bradycardia, Incomplete right bundle branch block, Left anterior fascicular block and Septal infarct , age undetermined. TTE: normal systolic function with LVEF of 68%.     Pt seen and examined on day of discharge. Patient is medically optimized for discharge to home with close outpatient followup.    PHYSICAL EXAM ON DAY OF DISCHARGE:  The patient was seen and examined on the day of discharge. Please see progress note from day of discharge for further information.   T(C): 36.6 (01-13-24 @ 09:44), Max: 36.8 (01-12-24 @ 21:35)  HR: 61 (01-13-24 @ 09:44) (56 - 98)  BP: 116/74 (01-13-24 @ 11:08) (103/70 - 169/96)  RR: 18 (01-13-24 @ 09:44) (18 - 18)  SpO2: 96% (01-13-24 @ 09:44) (92% - 96%)    PHYSICAL EXAM:  GENERAL: NAD, reading book in chair comfortably  HEENT: NC/AT,  anicteric, EOMI, moist mucous membranes  CHEST/LUNG: Lungs CTA BL  HEART:  +s1, s2. RRR.  ABDOMEN:  BS+, soft, nontender, nondistended  MUSCULOSKELETAL: no lower extremity edema, cyanosis, or calf tenderness  NEUROLOGIC: AAOX3; answers questions and follows commands appropriately  PSYCH: Appropriate mood and affect      ---  CONSULTANTS:   Cardio Dr Gao    ---  TIME SPENT:  I, the attending physician, was physically present for the key portions of the evaluation and management (E/M) service provided. The total amount of time spent reviewing the hospital notes, laboratory values, imaging findings, assessing/counseling the patient, discussing with consultant physicians, social work, nursing staff was 50 minutes

## 2024-01-11 NOTE — DISCHARGE NOTE PROVIDER - NSDCCPCAREPLAN_GEN_ALL_CORE_FT
PRINCIPAL DISCHARGE DIAGNOSIS  Diagnosis: Palpitations  Assessment and Plan of Treatment: You were admitted to the hospital for palpitations. This occurs if your heart rate is elevated or there is an arrhythmia, which is an abnormal heart rhythm. We stopped your Cardizem, Toprol, and Clonidine medications - please do not take these medications anymore. Instead, please continue taking Sotalol. The sotalol drug is an anti arrhythmic drug, which will help reduce the feeling of palpitations. Please follow up with your cardiologist within 1-2 weeks of discharge.      SECONDARY DISCHARGE DIAGNOSES  Diagnosis: Anxiety  Assessment and Plan of Treatment: You have a diagnosis of anxiety. This can cause increased stress levels, intrusive thoughts, and a general sense of being on edge. Please follow up with your PCP within 1-2 weeks of discharge to address this.     PRINCIPAL DISCHARGE DIAGNOSIS  Diagnosis: Palpitations  Assessment and Plan of Treatment: You were admitted to the hospital for palpitations. This occurs if your heart rate is elevated or there is an arrhythmia, which is an abnormal heart rhythm. We stopped your Cardizem, Toprol, and Clonidine medications - please do not take these medications anymore. Instead, please continue taking Sotalol and Lisinopril. The sotalol drug is an anti arrhythmic drug, which will help reduce the feeling of palpitations. The lisinopril medication will help reduce your blood pressure. Please follow up with your cardiologist within 1-2 weeks of discharge.      SECONDARY DISCHARGE DIAGNOSES  Diagnosis: Anxiety  Assessment and Plan of Treatment: You have a diagnosis of anxiety. This can cause increased stress levels, intrusive thoughts, and a general sense of being on edge. Please follow up with your PCP within 1-2 weeks of discharge to address this.     PRINCIPAL DISCHARGE DIAGNOSIS  Diagnosis: Palpitations  Assessment and Plan of Treatment: You were admitted to the hospital for palpitations. This occurs if your heart rate is elevated or there is an arrhythmia, which is an abnormal heart rhythm. We stopped your Cardizem, Toprol, and Clonidine medications - please do not take these medications anymore. Instead, please continue taking Sotalol and Lisinopril. The sotalol drug is an anti arrhythmic drug, which will help reduce the feeling of palpitations. The lisinopril medication will help reduce your blood pressure. Both Lisinopril and Sotalol will need to be taken twice per day. Please follow up with your cardiologist within 1-2 weeks of discharge.      SECONDARY DISCHARGE DIAGNOSES  Diagnosis: Anxiety  Assessment and Plan of Treatment: You have a diagnosis of anxiety. This can cause increased stress levels, intrusive thoughts, and a general sense of being on edge. Please follow up with your PCP within 1-2 weeks of discharge to address this.     PRINCIPAL DISCHARGE DIAGNOSIS  Diagnosis: Palpitations  Assessment and Plan of Treatment: You were admitted to the hospital for palpitations. This occurs if your heart rate is elevated or there is an arrhythmia, which is an abnormal heart rhythm. We stopped your Cardizem, Toprol, and Clonidine medications - please do not take these medications anymore. Instead, please continue taking Sotalol and Lisinopril. The sotalol drug is an anti arrhythmic drug, which will help reduce the feeling of palpitations. The lisinopril medication will help reduce your blood pressure. Both Lisinopril and Sotalol will need to be taken twice per day. Please follow up with your cardiologist within 1-2 weeks of discharge.  Sotalol 40mg twice a day and lisinopril 20mg twice a day      SECONDARY DISCHARGE DIAGNOSES  Diagnosis: Anxiety  Assessment and Plan of Treatment: You have a diagnosis of anxiety. This can cause increased stress levels, intrusive thoughts, and a general sense of being on edge. Please follow up with your PCP within 1-2 weeks of discharge to address this.

## 2024-01-11 NOTE — PROGRESS NOTE ADULT - NS ATTEND AMEND GEN_ALL_CORE FT
89 yo female w/ pmh of Atrial Tachycardia, Htn, Hld, Hypothyrodism, anxiety presents with 3 days of intermittent palpitations.    - pt with runs of PSVT. Appears to be having symptomatic more frequent PAT.   - noted to have SB in 50s on cardizem and toprol at baseline  - cardizem and toprol dc'ed.   - started on sotalol, now 40mg BID given her age and QTc prolongation from her baseline (~400ms)  - Now hypertensive, can increase lisinopril  - Continue to check EKGs two hours post each dose of sotalol  - Apparently had dizziness with Amio  - continue statin  - TTE pending.

## 2024-01-11 NOTE — DISCHARGE NOTE PROVIDER - NSDCMRMEDTOKEN_GEN_ALL_CORE_FT
Caltrate 600 + D oral tablet: 1 tab(s) orally once a day  cloNIDine 0.1 mg oral tablet: 1 tab(s) orally 2 times a day  dilTIAZem 120 mg/24 hours oral tablet, extended release: 1 tab(s) orally once a day  Fish Oil oral capsule:   fosinopril 20 mg oral tablet: 1 tab(s) orally once a day  levothyroxine 25 mcg (0.025 mg) oral tablet: 1 tab(s) orally once a day  Metoprolol Tartrate 50 mg oral tablet: 1 tab(s) orally 2 times a day  PreserVision oral tablet: 1 tab(s) orally once a day  simvastatin 10 mg oral tablet: 1 tab(s) orally once a day (at bedtime)   Caltrate 600 + D oral tablet: 1 tab(s) orally once a day  Fish Oil oral capsule:   levothyroxine 25 mcg (0.025 mg) oral tablet: 1 tab(s) orally once a day  lisinopril 20 mg oral tablet: 1 tab(s) orally 2 times a day  PreserVision oral tablet: 1 tab(s) orally once a day  simvastatin 10 mg oral tablet: 1 tab(s) orally once a day (at bedtime)  sotalol 80 mg oral tablet: 0.5 tab(s) orally 2 times a day

## 2024-01-11 NOTE — PROGRESS NOTE ADULT - PROBLEM SELECTOR PLAN 3
/79 on admission, elevated at times in setting of anxiety  - hypotensive after one IV hydralazine, d/c'd hydralazine  - clonidine d/c'd per cardio as it may be contributing to bradycardia  - Increased Lisinopril to 20mg BID as pt has become increasingly HTN  - Monitor hemodynamics

## 2024-01-11 NOTE — DISCHARGE NOTE PROVIDER - CARE PROVIDER_API CALL
[facility]  Internal Medicine  [address] [place] [id number]  Phone: [phone]  Fax: [phone]  Follow Up Time: 2 weeks   [facility]  Internal Medicine  [address] [place] [id number]  Phone: [phone]  Fax: [phone]  Follow Up Time: 2 weeks

## 2024-01-11 NOTE — DISCHARGE NOTE PROVIDER - CARE PROVIDERS DIRECT ADDRESSES
[email or web address]

## 2024-01-11 NOTE — PROGRESS NOTE ADULT - ASSESSMENT
89 yo female w/ PMH of Atrial Tachycardia, HTN, HLD, Hypothyroidism and Anxiety, admitted for palpitations.

## 2024-01-11 NOTE — PROGRESS NOTE ADULT - SUBJECTIVE AND OBJECTIVE BOX
[facility] -- Nitza Lozano,  Hugo, Sima, Adan, Mario Joe Cohen  Office # [phone]    Follow Up:  palpitations, PSVT's, PAT's    Subjective/Observations: No events overnight resting comfortably in bed.  No complaints of chest pain, dyspnea, or palpitations reported. No signs of orthopnea or PND.  Reports BP has been high, flowsheet reviewed 162/94.     REVIEW OF SYSTEMS: All other review of systems is negative unless indicated above  PAST MEDICAL & SURGICAL HISTORY:  HTN - Hypertension      Hypercholesteremia      Atrial tachycardia      Hypothyroid      S/P Cataract Surgery  B/L      Liver cyst  removed        MEDICATIONS  (STANDING):  enoxaparin Injectable 30 milliGRAM(s) SubCutaneous every 24 hours  levothyroxine 25 MICROGram(s) Oral daily  lisinopril 20 milliGRAM(s) Oral daily  multivitamin/minerals 1 Tablet(s) Oral daily  polyethylene glycol 3350 17 Gram(s) Oral daily  simvastatin 10 milliGRAM(s) Oral at bedtime  sotalol. 40 milliGRAM(s) Oral every 12 hours    MEDICATIONS  (PRN):  acetaminophen     Tablet .. 650 milliGRAM(s) Oral every 6 hours PRN Temp greater or equal to 38C (100.4F), Mild Pain (1 - 3)  melatonin 3 milliGRAM(s) Oral at bedtime PRN Insomnia    Allergies    penicillins (Unknown)    Intolerances      Vital Signs Last 24 Hrs  T(C): 36.7 (11 Jan 2024 05:32), Max: 36.7 (11 Jan 2024 05:32)  T(F): 98.1 (11 Jan 2024 05:32), Max: 98.1 (11 Jan 2024 05:32)  HR: 69 (11 Jan 2024 11:25) (52 - 94)  BP: 162/94 (11 Jan 2024 11:25) (157/77 - 170/92)  BP(mean): --  RR: 18 (11 Jan 2024 11:25) (18 - 18)  SpO2: 97% (11 Jan 2024 11:25) (95% - 97%)    Parameters below as of 11 Jan 2024 11:25  Patient On (Oxygen Delivery Method): room air      I&O's Summary    10 Bay 2024 07:01  -  11 Jan 2024 07:00  --------------------------------------------------------  IN: 200 mL / OUT: 2150 mL / NET: -1950 mL    11 Jan 2024 07:01  -  11 Jan 2024 12:04  --------------------------------------------------------  IN: 0 mL / OUT: 450 mL / NET: -450 mL        TELE: SB  Constitutional: NAD, awake and alert  HEENT: Moist Mucous Membranes, Anicteric  Pulmonary: Non-labored, breath sounds are clear bilaterally, No wheezing, rales or rhonchi  Cardiovascular: babar,  Regular, S1 and S2, No murmurs, rubs, gallops or clicks  Gastrointestinal: Bowel Sounds present, soft, nontender.   Lymph: No peripheral edema. No lymphadenopathy.  Skin: No visible rashes or ulcers.  Psych:  Mood & affect appropriate  LABS: All Labs Reviewed:                        11.9   8.03  )-----------( 221      ( 09 Jan 2024 06:32 )             35.1     09 Jan 2024 06:32    132    |  103    |  25     ----------------------------<  103    4.2     |  26     |  0.85     Ca    8.2        09 Jan 2024 06:32            12 Lead ECG:   Ventricular Rate 47 BPM    Atrial Rate 47 BPM    P-R Interval 166 ms    QRS Duration 100 ms    Q-T Interval 470 ms    QTC Calculation(Bazett) 415 ms    P Axis 38 degrees    R Axis -65 degrees    T Axis 55 degrees    Diagnosis Line Sinus bradycardia  Left anterior fascicular block  Minimal voltage criteria for LVH, may be normal variant ( [place] product )  Abnormal ECG  When compared with ECG of 10-BAY-2024 08:54,  premature supraventricular complexes are no longer present  Vent. rate has decreased BY  44 BPM  QT has shortened  Confirmed by EVELIA LINDO ([id number]),  NAZANIN HAZEL ([id number]) on 1/11/2024 8:31:20 AM (01-11-24 @ 08:12)      Patient name: ROBERT BELLA  YOB: 1929   Age: 84 (F)   MR#: [id number]  Study Date: 4/2/2014  Location: O/PSonographer: Jesica Benjamin  Study quality: Technically good  Referring Physician: Chavo Whitlock MD  ------------------------------------------------------------------------  Blood Pressure: 163/80 mmHg  Height: 5ft 0in  Weight: 134 lb  BSA: 1.6 m2  ------------------------------------------------------------------------  PROCEDURE: Transthoracic echocardiogram with 2-D, M-Mode  and complete spectral and color flow Doppler.  INDICATION: Abnormal Echocardiogram/EKG (794.31)  ------------------------------------------------------------------------  DIMENSIONS:  Dimensions:     Normal Values:  LA:     2.6 cm    2.0 - 4.0 cm  Ao:     2.8 cm    2.0 - 3.8 cm  SEPTUM: 0.6 cm    0.6 - 1.2 cm  PWT:    0.6 cm    0.6 - 1.1 cm  LVIDd:  3.9 cm    3.0 - 5.6 cm  LVIDs:  1.8 cm    1.8 - 4.0 cm  Derived Variables:  LVMI: 39 g/m2  RWT: 0.30  Fractional short: 54 %  Ejection Fraction: 85 %  ------------------------------------------------------------------------  OBSERVATIONS:  Mitral Valve: Normal mitral valve.  Aortic Root: Normal aortic root.  Aortic Valve: Normal trileaflet aortic valve.  Left Atrium: Normal left atrium  Left Ventricle: Normal left ventricular internal dimensions  and wall thicknesses.  Hyperdynamic left ventricle.  Right Heart: Normal right atrium.  Normal right ventricular size and function.  Normal tricuspid valve. Moderate tricuspid regurgitation.  Normal pulmonic valve.  Estimated pulmonary artery systolic pressure equals 43 mm  Hg, assuming right atrial pressure equals 10  mm Hg,  consistent with mild pulmonary hypertension.  Pericardium/PleuraNormal pericardium with no pericardial  effusion.  Large cystic structure noted within the liver of unclear  etiology. Recommend dedicated sonography to evaluate.  ------------------------------------------------------------------------  CONCLUSIONS:  1. Normal left ventricular internal dimensions and wall  thicknesses.  2. Hyperdynamic left ventricle.  ------------------------------------------------------------------------  Confirmed on  4/2/2014 - 14:17:06 by Jesus Crain M.D.  ------------------------------------------------------------------------      [facility] -- Nitza Lozano,  Hugo, Sima, Adan, Mario Joe Cohen  Office # [phone]    Follow Up:  palpitations, PSVT's, PAT's    Subjective/Observations: No events overnight resting comfortably in bed.  No complaints of chest pain, dyspnea, or palpitations reported. No signs of orthopnea or PND.  Reports BP has been high, flowsheet reviewed 162/94.     REVIEW OF SYSTEMS: All other review of systems is negative unless indicated above  PAST MEDICAL & SURGICAL HISTORY:  HTN - Hypertension      Hypercholesteremia      Atrial tachycardia      Hypothyroid      S/P Cataract Surgery  B/L      Liver cyst  removed        MEDICATIONS  (STANDING):  enoxaparin Injectable 30 milliGRAM(s) SubCutaneous every 24 hours  levothyroxine 25 MICROGram(s) Oral daily  lisinopril 20 milliGRAM(s) Oral daily  multivitamin/minerals 1 Tablet(s) Oral daily  polyethylene glycol 3350 17 Gram(s) Oral daily  simvastatin 10 milliGRAM(s) Oral at bedtime  sotalol. 40 milliGRAM(s) Oral every 12 hours    MEDICATIONS  (PRN):  acetaminophen     Tablet .. 650 milliGRAM(s) Oral every 6 hours PRN Temp greater or equal to 38C (100.4F), Mild Pain (1 - 3)  melatonin 3 milliGRAM(s) Oral at bedtime PRN Insomnia    Allergies    penicillins (Unknown)    Intolerances      Vital Signs Last 24 Hrs  T(C): 36.7 (11 Jan 2024 05:32), Max: 36.7 (11 Jan 2024 05:32)  T(F): 98.1 (11 Jan 2024 05:32), Max: 98.1 (11 Jan 2024 05:32)  HR: 69 (11 Jan 2024 11:25) (52 - 94)  BP: 162/94 (11 Jan 2024 11:25) (157/77 - 170/92)  BP(mean): --  RR: 18 (11 Jan 2024 11:25) (18 - 18)  SpO2: 97% (11 Jan 2024 11:25) (95% - 97%)    Parameters below as of 11 Jan 2024 11:25  Patient On (Oxygen Delivery Method): room air      I&O's Summary    10 Bay 2024 07:01  -  11 Jan 2024 07:00  --------------------------------------------------------  IN: 200 mL / OUT: 2150 mL / NET: -1950 mL    11 Jan 2024 07:01  -  11 Jan 2024 12:04  --------------------------------------------------------  IN: 0 mL / OUT: 450 mL / NET: -450 mL        TELE: SB  Constitutional: NAD, awake and alert  HEENT: Moist Mucous Membranes, Anicteric  Pulmonary: Non-labored, breath sounds are clear bilaterally, No wheezing, rales or rhonchi  Cardiovascular: babar,  Regular, S1 and S2, No murmurs, rubs, gallops or clicks  Gastrointestinal: Bowel Sounds present, soft, nontender.   Lymph: No peripheral edema. No lymphadenopathy.  Skin: No visible rashes or ulcers.  Psych:  Mood & affect appropriate  LABS: All Labs Reviewed:                        11.9   8.03  )-----------( 221      ( 09 Jan 2024 06:32 )             35.1     09 Jan 2024 06:32    132    |  103    |  25     ----------------------------<  103    4.2     |  26     |  0.85     Ca    8.2        09 Jan 2024 06:32            12 Lead ECG:   Ventricular Rate 47 BPM    Atrial Rate 47 BPM    P-R Interval 166 ms    QRS Duration 100 ms    Q-T Interval 470 ms    QTC Calculation(Bazett) 415 ms    P Axis 38 degrees    R Axis -65 degrees    T Axis 55 degrees    Diagnosis Line Sinus bradycardia  Left anterior fascicular block  Minimal voltage criteria for LVH, may be normal variant ( [place] product )  Abnormal ECG  When compared with ECG of 10-BAY-2024 08:54,  premature supraventricular complexes are no longer present  Vent. rate has decreased BY  44 BPM  QT has shortened  Confirmed by EVELIA LINDO (92),  NAZANIN HAZEL ([id number]) on 1/11/2024 8:31:20 AM (01-11-24 @ 08:12)      Patient name: ROBERT BELLA  YOB: 1929   Age: 84 (F)   MR#: [id number]  Study Date: 4/2/2014  Location: O/PSonographer: Jesica Benjamin  Study quality: Technically good  Referring Physician: Chavo Whitlock MD  ------------------------------------------------------------------------  Blood Pressure: 163/80 mmHg  Height: 5ft 0in  Weight: 134 lb  BSA: 1.6 m2  ------------------------------------------------------------------------  PROCEDURE: Transthoracic echocardiogram with 2-D, M-Mode  and complete spectral and color flow Doppler.  INDICATION: Abnormal Echocardiogram/EKG (794.31)  ------------------------------------------------------------------------  DIMENSIONS:  Dimensions:     Normal Values:  LA:     2.6 cm    2.0 - 4.0 cm  Ao:     2.8 cm    2.0 - 3.8 cm  SEPTUM: 0.6 cm    0.6 - 1.2 cm  PWT:    0.6 cm    0.6 - 1.1 cm  LVIDd:  3.9 cm    3.0 - 5.6 cm  LVIDs:  1.8 cm    1.8 - 4.0 cm  Derived Variables:  LVMI: 39 g/m2  RWT: 0.30  Fractional short: 54 %  Ejection Fraction: 85 %  ------------------------------------------------------------------------  OBSERVATIONS:  Mitral Valve: Normal mitral valve.  Aortic Root: Normal aortic root.  Aortic Valve: Normal trileaflet aortic valve.  Left Atrium: Normal left atrium  Left Ventricle: Normal left ventricular internal dimensions  and wall thicknesses.  Hyperdynamic left ventricle.  Right Heart: Normal right atrium.  Normal right ventricular size and function.  Normal tricuspid valve. Moderate tricuspid regurgitation.  Normal pulmonic valve.  Estimated pulmonary artery systolic pressure equals 43 mm  Hg, assuming right atrial pressure equals 10  mm Hg,  consistent with mild pulmonary hypertension.  Pericardium/PleuraNormal pericardium with no pericardial  effusion.  Large cystic structure noted within the liver of unclear  etiology. Recommend dedicated sonography to evaluate.  ------------------------------------------------------------------------  CONCLUSIONS:  1. Normal left ventricular internal dimensions and wall  thicknesses.  2. Hyperdynamic left ventricle.  ------------------------------------------------------------------------  Confirmed on  4/2/2014 - 14:17:06 by Jesus Crain M.D.  ------------------------------------------------------------------------

## 2024-01-11 NOTE — PROGRESS NOTE ADULT - PROBLEM SELECTOR PLAN 1
Presented with intermittent palpitations  - CTA: Negative for PE, negative for lung consolidation  - No CP, SOB associated   - RVP negative  - Monitor on Telemetry  - Cardiology consult noted, changed to sotalol  - TTE performed, pending results

## 2024-01-11 NOTE — PROGRESS NOTE ADULT - SUBJECTIVE AND OBJECTIVE BOX
Patient is a 89y old  Female who presents with a chief complaint of Palpitations (11 Jan 2024 12:04)      INTERVAL HPI/OVERNIGHT EVENTS: Pt seen and examined at bedside. Pt has no acute concerns. Palpitations have resolved and patient denies chest pain and shortness of breath.     MEDICATIONS  (STANDING):  enoxaparin Injectable 30 milliGRAM(s) SubCutaneous every 24 hours  levothyroxine 25 MICROGram(s) Oral daily  lisinopril 20 milliGRAM(s) Oral two times a day  multivitamin/minerals 1 Tablet(s) Oral daily  polyethylene glycol 3350 17 Gram(s) Oral daily  simvastatin 10 milliGRAM(s) Oral at bedtime  sotalol. 40 milliGRAM(s) Oral every 12 hours    MEDICATIONS  (PRN):  acetaminophen     Tablet .. 650 milliGRAM(s) Oral every 6 hours PRN Temp greater or equal to 38C (100.4F), Mild Pain (1 - 3)  melatonin 3 milliGRAM(s) Oral at bedtime PRN Insomnia      Allergies    penicillins (Unknown)    Intolerances        REVIEW OF SYSTEMS:  CONSTITUTIONAL: No fever or chills  HEENT:  No headache, no sore throat  RESPIRATORY: No cough, wheezing, or shortness of breath  CARDIOVASCULAR: No chest pain, palpitations  GASTROINTESTINAL: No abd pain, nausea, vomiting, or diarrhea  GENITOURINARY: No dysuria, frequency, or hematuria  NEUROLOGICAL: no focal weakness or dizziness  MUSCULOSKELETAL: no myalgias   INTEGUMENTARY:     Vital Signs Last 24 Hrs  T(C): 36.8 (11 Jan 2024 12:39), Max: 36.8 (11 Jan 2024 12:39)  T(F): 98.3 (11 Jan 2024 12:39), Max: 98.3 (11 Jan 2024 12:39)  HR: 69 (11 Jan 2024 11:25) (52 - 94)  BP: 162/94 (11 Jan 2024 11:25) (157/77 - 170/92)  BP(mean): --  RR: 18 (11 Jan 2024 11:25) (18 - 18)  SpO2: 97% (11 Jan 2024 11:25) (95% - 97%)    Parameters below as of 11 Jan 2024 11:25  Patient On (Oxygen Delivery Method): room air        GENERAL: patient appears well, no acute distress, appropriately interactive  HEENT: NC/AT, EOMI.   LUNGS: good air entry bilaterally, clear to auscultation, symmetric breath sounds, no wheezing or rhonchi appreciated  HEART: soft S1/S2, bradycardic.   ABDOMEN: soft, nontender, nondistended, normoactive bowel sounds  INTEGUMENT: good skin turgor, warm skin, appears well perfused  MUSCULOSKELETAL: no clubbing or cyanosis, no obvious deformity  NEUROLOGIC: awake, alert, oriented x3.      LABS:      11 Jan 2024 11:00    132    |  102    |  25     ----------------------------<  133    5.1     |  23     |  0.97     Ca    8.6        11 Jan 2024 11:00  Phos  3.6       11 Jan 2024 11:00  Mg     2.3       11 Jan 2024 11:00        Urinalysis Basic - ( 11 Jan 2024 11:00 )    Color: x / Appearance: x / SG: x / pH: x  Gluc: 133 mg/dL / Ketone: x  / Bili: x / Urobili: x   Blood: x / Protein: x / Nitrite: x   Leuk Esterase: x / RBC: x / WBC x   Sq Epi: x / Non Sq Epi: x / Bacteria: x      CAPILLARY BLOOD GLUCOSE            Culture - Urine (collected 01-06-24 @ 18:59)  Source: Clean Catch Clean Catch (Midstream)  Final Report (01-09-24 @ 00:06):    Normal Urogenital gaurav present        RADIOLOGY & ADDITIONAL TESTS:    Personally reviewed.     Consultant(s) Notes Reviewed:  [x] YES  [ ] NO

## 2024-01-12 LAB
ALBUMIN SERPL ELPH-MCNC: 3 G/DL — LOW (ref 3.3–5)
ALBUMIN SERPL ELPH-MCNC: 3 G/DL — LOW (ref 3.3–5)
ALP SERPL-CCNC: 68 U/L — SIGNIFICANT CHANGE UP (ref 40–120)
ALP SERPL-CCNC: 68 U/L — SIGNIFICANT CHANGE UP (ref 40–120)
ALT FLD-CCNC: 33 U/L — SIGNIFICANT CHANGE UP (ref 12–78)
ALT FLD-CCNC: 33 U/L — SIGNIFICANT CHANGE UP (ref 12–78)
ANION GAP SERPL CALC-SCNC: 7 MMOL/L — SIGNIFICANT CHANGE UP (ref 5–17)
ANION GAP SERPL CALC-SCNC: 7 MMOL/L — SIGNIFICANT CHANGE UP (ref 5–17)
AST SERPL-CCNC: 25 U/L — SIGNIFICANT CHANGE UP (ref 15–37)
AST SERPL-CCNC: 25 U/L — SIGNIFICANT CHANGE UP (ref 15–37)
BILIRUB SERPL-MCNC: 0.4 MG/DL — SIGNIFICANT CHANGE UP (ref 0.2–1.2)
BILIRUB SERPL-MCNC: 0.4 MG/DL — SIGNIFICANT CHANGE UP (ref 0.2–1.2)
BUN SERPL-MCNC: 25 MG/DL — HIGH (ref 7–23)
BUN SERPL-MCNC: 25 MG/DL — HIGH (ref 7–23)
CALCIUM SERPL-MCNC: 8.6 MG/DL — SIGNIFICANT CHANGE UP (ref 8.5–10.1)
CALCIUM SERPL-MCNC: 8.6 MG/DL — SIGNIFICANT CHANGE UP (ref 8.5–10.1)
CHLORIDE SERPL-SCNC: 101 MMOL/L — SIGNIFICANT CHANGE UP (ref 96–108)
CHLORIDE SERPL-SCNC: 101 MMOL/L — SIGNIFICANT CHANGE UP (ref 96–108)
CO2 SERPL-SCNC: 26 MMOL/L — SIGNIFICANT CHANGE UP (ref 22–31)
CO2 SERPL-SCNC: 26 MMOL/L — SIGNIFICANT CHANGE UP (ref 22–31)
CREAT SERPL-MCNC: 0.81 MG/DL — SIGNIFICANT CHANGE UP (ref 0.5–1.3)
CREAT SERPL-MCNC: 0.81 MG/DL — SIGNIFICANT CHANGE UP (ref 0.5–1.3)
EGFR: 67 ML/MIN/1.73M2 — SIGNIFICANT CHANGE UP
EGFR: 67 ML/MIN/1.73M2 — SIGNIFICANT CHANGE UP
GLUCOSE SERPL-MCNC: 105 MG/DL — HIGH (ref 70–99)
GLUCOSE SERPL-MCNC: 105 MG/DL — HIGH (ref 70–99)
HCT VFR BLD CALC: 37 % — SIGNIFICANT CHANGE UP (ref 34.5–45)
HCT VFR BLD CALC: 37 % — SIGNIFICANT CHANGE UP (ref 34.5–45)
HGB BLD-MCNC: 12.6 G/DL — SIGNIFICANT CHANGE UP (ref 11.5–15.5)
HGB BLD-MCNC: 12.6 G/DL — SIGNIFICANT CHANGE UP (ref 11.5–15.5)
MCHC RBC-ENTMCNC: 29.9 PG — SIGNIFICANT CHANGE UP (ref 27–34)
MCHC RBC-ENTMCNC: 29.9 PG — SIGNIFICANT CHANGE UP (ref 27–34)
MCHC RBC-ENTMCNC: 34.1 GM/DL — SIGNIFICANT CHANGE UP (ref 32–36)
MCHC RBC-ENTMCNC: 34.1 GM/DL — SIGNIFICANT CHANGE UP (ref 32–36)
MCV RBC AUTO: 87.9 FL — SIGNIFICANT CHANGE UP (ref 80–100)
MCV RBC AUTO: 87.9 FL — SIGNIFICANT CHANGE UP (ref 80–100)
NRBC # BLD: 0 /100 WBCS — SIGNIFICANT CHANGE UP (ref 0–0)
NRBC # BLD: 0 /100 WBCS — SIGNIFICANT CHANGE UP (ref 0–0)
PLATELET # BLD AUTO: 242 K/UL — SIGNIFICANT CHANGE UP (ref 150–400)
PLATELET # BLD AUTO: 242 K/UL — SIGNIFICANT CHANGE UP (ref 150–400)
POTASSIUM SERPL-MCNC: 4.5 MMOL/L — SIGNIFICANT CHANGE UP (ref 3.5–5.3)
POTASSIUM SERPL-MCNC: 4.5 MMOL/L — SIGNIFICANT CHANGE UP (ref 3.5–5.3)
POTASSIUM SERPL-SCNC: 4.5 MMOL/L — SIGNIFICANT CHANGE UP (ref 3.5–5.3)
POTASSIUM SERPL-SCNC: 4.5 MMOL/L — SIGNIFICANT CHANGE UP (ref 3.5–5.3)
PROT SERPL-MCNC: 6.6 G/DL — SIGNIFICANT CHANGE UP (ref 6–8.3)
PROT SERPL-MCNC: 6.6 G/DL — SIGNIFICANT CHANGE UP (ref 6–8.3)
RBC # BLD: 4.21 M/UL — SIGNIFICANT CHANGE UP (ref 3.8–5.2)
RBC # BLD: 4.21 M/UL — SIGNIFICANT CHANGE UP (ref 3.8–5.2)
RBC # FLD: 13.9 % — SIGNIFICANT CHANGE UP (ref 10.3–14.5)
RBC # FLD: 13.9 % — SIGNIFICANT CHANGE UP (ref 10.3–14.5)
SODIUM SERPL-SCNC: 134 MMOL/L — LOW (ref 135–145)
SODIUM SERPL-SCNC: 134 MMOL/L — LOW (ref 135–145)
WBC # BLD: 7.2 K/UL — SIGNIFICANT CHANGE UP (ref 3.8–10.5)
WBC # BLD: 7.2 K/UL — SIGNIFICANT CHANGE UP (ref 3.8–10.5)
WBC # FLD AUTO: 7.2 K/UL — SIGNIFICANT CHANGE UP (ref 3.8–10.5)
WBC # FLD AUTO: 7.2 K/UL — SIGNIFICANT CHANGE UP (ref 3.8–10.5)

## 2024-01-12 PROCEDURE — 93010 ELECTROCARDIOGRAM REPORT: CPT | Mod: 76

## 2024-01-12 PROCEDURE — 99233 SBSQ HOSP IP/OBS HIGH 50: CPT | Mod: GC

## 2024-01-12 PROCEDURE — 99232 SBSQ HOSP IP/OBS MODERATE 35: CPT

## 2024-01-12 RX ORDER — HYDRALAZINE HCL 50 MG
25 TABLET ORAL THREE TIMES A DAY
Refills: 0 | Status: DISCONTINUED | OUTPATIENT
Start: 2024-01-12 | End: 2024-01-13

## 2024-01-12 RX ADMIN — SODIUM CHLORIDE 1 GRAM(S): 9 INJECTION INTRAMUSCULAR; INTRAVENOUS; SUBCUTANEOUS at 05:05

## 2024-01-12 RX ADMIN — Medication 1 TABLET(S): at 13:05

## 2024-01-12 RX ADMIN — ENOXAPARIN SODIUM 30 MILLIGRAM(S): 100 INJECTION SUBCUTANEOUS at 05:03

## 2024-01-12 RX ADMIN — Medication 25 MILLIGRAM(S): at 22:01

## 2024-01-12 RX ADMIN — Medication 25 MILLIGRAM(S): at 14:52

## 2024-01-12 RX ADMIN — Medication 650 MILLIGRAM(S): at 00:13

## 2024-01-12 RX ADMIN — LISINOPRIL 20 MILLIGRAM(S): 2.5 TABLET ORAL at 18:00

## 2024-01-12 RX ADMIN — Medication 40 MILLIGRAM(S): at 09:01

## 2024-01-12 RX ADMIN — Medication 25 MICROGRAM(S): at 05:05

## 2024-01-12 RX ADMIN — POLYETHYLENE GLYCOL 3350 17 GRAM(S): 17 POWDER, FOR SOLUTION ORAL at 13:05

## 2024-01-12 RX ADMIN — SIMVASTATIN 10 MILLIGRAM(S): 20 TABLET, FILM COATED ORAL at 22:01

## 2024-01-12 RX ADMIN — LISINOPRIL 20 MILLIGRAM(S): 2.5 TABLET ORAL at 05:07

## 2024-01-12 RX ADMIN — Medication 40 MILLIGRAM(S): at 22:01

## 2024-01-12 NOTE — PROGRESS NOTE ADULT - SUBJECTIVE AND OBJECTIVE BOX
Patient is a 89y old  Female who presents with a chief complaint of Palpitations (12 Jan 2024 11:19)    INTERVAL HPI/OVERNIGHT EVENTS: Pt seen and examined at bedside. Pt denies acute concerns. Reports difficulty sleeping overnight. Overnight sotalol retimed to 2 hrs later due to low HR (50s)    MEDICATIONS  (STANDING):  enoxaparin Injectable 30 milliGRAM(s) SubCutaneous every 24 hours  hydrALAZINE 25 milliGRAM(s) Oral three times a day  levothyroxine 25 MICROGram(s) Oral daily  lisinopril 20 milliGRAM(s) Oral two times a day  multivitamin/minerals 1 Tablet(s) Oral daily  polyethylene glycol 3350 17 Gram(s) Oral daily  simvastatin 10 milliGRAM(s) Oral at bedtime  sotalol. 40 milliGRAM(s) Oral every 12 hours    MEDICATIONS  (PRN):  acetaminophen     Tablet .. 650 milliGRAM(s) Oral every 6 hours PRN Temp greater or equal to 38C (100.4F), Mild Pain (1 - 3)  melatonin 3 milliGRAM(s) Oral at bedtime PRN Insomnia      Allergies    penicillins (Unknown)    Intolerances        REVIEW OF SYSTEMS:  CONSTITUTIONAL: No fever or chills  HEENT:  No headache, no sore throat  RESPIRATORY: No cough, wheezing, or shortness of breath  CARDIOVASCULAR: No chest pain, palpitations  GASTROINTESTINAL: No abd pain, nausea, vomiting, or diarrhea  GENITOURINARY: No dysuria, frequency, or hematuria  NEUROLOGICAL: no focal weakness or dizziness  MUSCULOSKELETAL: no myalgias     Vital Signs Last 24 Hrs  T(C): 36.6 (12 Jan 2024 12:26), Max: 36.8 (11 Jan 2024 20:00)  T(F): 97.8 (12 Jan 2024 12:26), Max: 98.2 (11 Jan 2024 20:00)  HR: 57 (12 Jan 2024 14:48) (55 - 98)  BP: 147/73 (12 Jan 2024 14:48) (147/73 - 188/79)  BP(mean): --  RR: 18 (12 Jan 2024 12:26) (18 - 18)  SpO2: 92% (12 Jan 2024 12:26) (92% - 95%)    Parameters below as of 12 Jan 2024 12:26  Patient On (Oxygen Delivery Method): room air        PHYSICAL EXAM:  GENERAL: NAD, laying comfortably in bed  HEENT:  anicteric, moist mucous membranes  CHEST/LUNG: scattered exp wheezing  HEART:  tachy rate, regular rhythm  ABDOMEN:  BS+, soft, nontender, nondistended  MUSCULOSKELETAL: no edema, cyanosis, or calf tenderness  NEUROLOGIC: answers questions and follows commands appropriately      LABS:                        12.6   7.20  )-----------( 242      ( 12 Jan 2024 07:10 )             37.0     CBC Full  -  ( 12 Jan 2024 07:10 )  WBC Count : 7.20 K/uL  Hemoglobin : 12.6 g/dL  Hematocrit : 37.0 %  Platelet Count - Automated : 242 K/uL  Mean Cell Volume : 87.9 fl  Mean Cell Hemoglobin : 29.9 pg  Mean Cell Hemoglobin Concentration : 34.1 gm/dL  Auto Neutrophil # : x  Auto Lymphocyte # : x  Auto Monocyte # : x  Auto Eosinophil # : x  Auto Basophil # : x  Auto Neutrophil % : x  Auto Lymphocyte % : x  Auto Monocyte % : x  Auto Eosinophil % : x  Auto Basophil % : x    12 Jan 2024 07:10    134    |  101    |  25     ----------------------------<  105    4.5     |  26     |  0.81     Ca    8.6        12 Jan 2024 07:10    TPro  6.6    /  Alb  3.0    /  TBili  0.4    /  DBili  x      /  AST  25     /  ALT  33     /  AlkPhos  68     12 Jan 2024 07:10      Urinalysis Basic - ( 12 Jan 2024 07:10 )    Color: x / Appearance: x / SG: x / pH: x  Gluc: 105 mg/dL / Ketone: x  / Bili: x / Urobili: x   Blood: x / Protein: x / Nitrite: x   Leuk Esterase: x / RBC: x / WBC x   Sq Epi: x / Non Sq Epi: x / Bacteria: x      CAPILLARY BLOOD GLUCOSE    Culture - Urine (collected 01-06-24 @ 18:59)  Source: Clean Catch Clean Catch (Midstream)  Final Report (01-09-24 @ 00:06):    Normal Urogenital gaurav present        RADIOLOGY & ADDITIONAL TESTS:    Personally reviewed.     Consultant(s) Notes Reviewed:  [x] YES  [ ] NO

## 2024-01-12 NOTE — PROGRESS NOTE ADULT - ASSESSMENT
89 yo female w/ pmh of Atrial Tachycardia, Htn, Hld, Hypothyrodism, anxiety presents with 3 days of intermittent palpitations.    Palpitations/PSVT/PAT  - Presented with symptomatic PSVT's  and PAT's  - No further atrial arrhythmias on tele  - Maintaining mostly SB on tele at 50s  - Continue Sotalol 40mg q12 hrs  - Continue off home Cardizem and Toprol dc'ed.   - EKG with baseline , QTC wnl 1/12  - Unable to utilize Amio due to dizziness  - Continue statin    - No signs of significant ischemia or volume overload.   - EKG nonischemic  - Appears compensated from HF POV.  Non-orthopneic on RA  - TTE normal LV systolic function, EF 68%    - Bp elevated, on lisinopril 20mg BID  - can add hydralazine 25mg TID for bp control  - home clonidine was dc'ed, as it was likely contributing to bradycardia    - Monitor and replete lytes, keep K>4, Mg>2.  - Will continue to follow.    Marti Jones NP  Nurse Practitioner- Cardiology   Call TEAMS

## 2024-01-12 NOTE — PROGRESS NOTE ADULT - PROBLEM/PLAN-3
DISPLAY PLAN FREE TEXT
2

## 2024-01-12 NOTE — PROGRESS NOTE ADULT - PROBLEM SELECTOR PROBLEM 4
HLD (hyperlipidemia)
regular

## 2024-01-12 NOTE — PROGRESS NOTE ADULT - ATTENDING COMMENTS
Patient seen at bedside.   comfortable   denies any palpitations.   tolerating diet   BP still elevated.    Palpitations-PSVT  HTN  HLD    - on sotalol 40mg BID. EKG monitoring    - unable to tolerate amiodarone     - cont statin     - start hydralazine 25mg TID and cont lisinopril 20mg BID. monitor BP overnight     Hypothyroidism     - TSH 1.77    - cont levothyroxine     DVT proph: lovenox
Patient seen at bedside.   comfortable   denies any palpitations.   NSR overnight   tolerating diet   BP still elevated.    Palpitations-PSVT  HTN  HLD    - on sotalol 40mg BID. EKG monitoring    - unable to tolerate amiodarone     - cont statin     Hypothyroidism     - TSH 1.77    - cont levothyroxine     DVT proph: lovenox     d/w cardio, increased lisinopril to 20mg BID. monitor BP. ECHO performed, pending read.   possible discharge in AM if BP improves.

## 2024-01-12 NOTE — PROGRESS NOTE ADULT - ASSESSMENT
89 yo female w/ PMH of Atrial Tachycardia, HTN, HLD, Hypothyroidism and Anxiety, admitted for palpitations.

## 2024-01-12 NOTE — PROGRESS NOTE ADULT - NS ATTEND AMEND GEN_ALL_CORE FT
89 yo female w/ pmh of Atrial Tachycardia, Htn, Hld, Hypothyrodism, anxiety presents with 3 days of intermittent palpitations.    Palpitations/PSVT/PAT  - Presented with symptomatic PSVT's  and PAT's  - No further atrial arrhythmias on tele  - Maintaining mostly SB on tele at 50s  - Continue Sotalol 40mg q12 hrs  - Continue off home Cardizem and Toprol dc'ed.   - EKG with baseline , QTC wnl 1/12  - Unable to utilize Amio due to dizziness  - Continue statin    - No signs of significant ischemia or volume overload.   - EKG nonischemic  - Appears compensated from HF POV.  Non-orthopneic on RA  - TTE normal LV systolic function, EF 68%    - Bp elevated, on lisinopril 20mg BID  - can add hydralazine 25mg TID for bp control  - home clonidine was dc'ed, as it was likely contributing to bradycardia    - Monitor and replete lytes, keep K>4, Mg>2.  - Will continue to follow. 89 yo female w/ pmh of Atrial Tachycardia, Htn, Hld, Hypothyrodism, anxiety presents with 3 days of intermittent palpitations.    Palpitations/PSVT/PAT  - Presented with symptomatic PSVT's  and PAT's  - No further atrial arrhythmias on tele  - Maintaining mostly SB on tele at 50s  - Continue Sotalol 40mg q12 hrs  - Continue off home Cardizem and Toprol dc'ed.   - EKG with baseline , QTC wnl 1/12  - Unable to utilize Amio due to dizziness  - Continue statin    - No signs of significant ischemia or volume overload.   - EKG nonischemic  - Appears compensated from HF POV.  Non-orthopneic on RA  - TTE normal LV systolic function, EF 68%    - Bp elevated, on lisinopril 20mg BID  - can add hydralazine 25mg TID for bp control  - home clonidine was dc'ed, as it was likely contributing to bradycardia    - Monitor and replete lytes, keep K>4, Mg>2.  - Will continue to follow.

## 2024-01-12 NOTE — PROGRESS NOTE ADULT - PROBLEM SELECTOR PLAN 4
Continue home simvastatin  - Lipid Panel level elevated
Continue home simvastatin  - Lipid Panel level elevated
Continue home simvastatin  - Lipid Panel level elevated  - Outpatient follow up
Continue home simvastatin  - Lipid Panel level elevated

## 2024-01-12 NOTE — PROGRESS NOTE ADULT - SUBJECTIVE AND OBJECTIVE BOX
[facility] -- Nitza Lozano,  Hugo, Adan Lindo, Mario Joe Cohen  Office # [phone]    Follow Up:    palpitations, PSVT's, PAT's    Subjective/Observations: No events overnight resting comfortably in bed.  No complaints of chest pain, dyspnea, or palpitations reported. No signs of orthopnea or PND.    REVIEW OF SYSTEMS: All other review of systems is negative unless indicated above  PAST MEDICAL & SURGICAL HISTORY:  HTN - Hypertension      Hypercholesteremia      Atrial tachycardia      Hypothyroid      S/P Cataract Surgery  B/L      Liver cyst  removed        MEDICATIONS  (STANDING):  enoxaparin Injectable 30 milliGRAM(s) SubCutaneous every 24 hours  levothyroxine 25 MICROGram(s) Oral daily  lisinopril 20 milliGRAM(s) Oral two times a day  multivitamin/minerals 1 Tablet(s) Oral daily  polyethylene glycol 3350 17 Gram(s) Oral daily  simvastatin 10 milliGRAM(s) Oral at bedtime  sotalol. 40 milliGRAM(s) Oral every 12 hours    MEDICATIONS  (PRN):  acetaminophen     Tablet .. 650 milliGRAM(s) Oral every 6 hours PRN Temp greater or equal to 38C (100.4F), Mild Pain (1 - 3)  melatonin 3 milliGRAM(s) Oral at bedtime PRN Insomnia    Allergies    penicillins (Unknown)    Intolerances      Vital Signs Last 24 Hrs  T(C): 36.4 (12 Jan 2024 05:12), Max: 36.8 (11 Jan 2024 12:39)  T(F): 97.6 (12 Jan 2024 05:12), Max: 98.3 (11 Jan 2024 12:39)  HR: 60 (12 Jan 2024 08:59) (55 - 69)  BP: 173/64 (12 Jan 2024 08:59) (150/70 - 188/79)  BP(mean): --  RR: 18 (12 Jan 2024 05:12) (18 - 18)  SpO2: 95% (12 Jan 2024 05:12) (93% - 97%)    Parameters below as of 12 Jan 2024 05:12  Patient On (Oxygen Delivery Method): room air      I&O's Summary    11 Jan 2024 07:01  -  12 Jan 2024 07:00  --------------------------------------------------------  IN: 0 mL / OUT: 2050 mL / NET: -2050 mL        TELE: SB- ST  PHYSICAL EXAM:  Constitutional: NAD, awake and alert  HEENT: Moist Mucous Membranes, Anicteric  Pulmonary: Non-labored, breath sounds are clear bilaterally, No wheezing, rales or rhonchi  Cardiovascular: babar,  Regular, S1 and S2, No murmurs, rubs, gallops or clicks  Gastrointestinal: Bowel Sounds present, soft, nontender.   Lymph: No peripheral edema. No lymphadenopathy.  Skin: No visible rashes or ulcers.  Psych:  Mood & affect appropriate    LABS: All Labs Reviewed:                        12.6 7.20  )-----------( 242      ( 12 Jan 2024 07:10 )             37.0     12 Jan 2024 07:10    134    |  101    |  25     ----------------------------<  105    4.5     |  26     |  0.81   11 Jan 2024 11:00    132    |  102    |  25     ----------------------------<  133    5.1     |  23     |  0.97     Ca    8.6        12 Jan 2024 07:10  Ca    8.6        11 Jan 2024 11:00  Phos  3.6       11 Jan 2024 11:00  Mg     2.3       11 Jan 2024 11:00    TPro  6.6    /  Alb  3.0    /  TBili  0.4    /  DBili  x      /  AST  25     /  ALT  33     /  AlkPhos  68     12 Jan 2024 07:10          12 Lead ECG:   Ventricular Rate 47 BPM    Atrial Rate 47 BPM    P-R Interval 166 ms    QRS Duration 100 ms    Q-T Interval 470 ms    QTC Calculation(Bazett) 415 ms    P Axis 38 degrees    R Axis -65 degrees    T Axis 55 degrees    Diagnosis Line Sinus bradycardia  Left anterior fascicular block  Minimal voltage criteria for LVH, may be normal variant ( Delano product )  Abnormal ECG  When compared with ECG of 10-ANGY-2024 08:54,  premature supraventricular complexes are no longer present  Vent. rate has decreased BY  44 BPM  QT has shortened  Confirmed by EVELIA LINDO ([id number]),  NAZANIN HAZEL ([id number]) on 1/11/2024 8:31:20 AM (01-11-24 @ 08:12)      Patient name: ROBERT BELLA  YOB: 1929   Age: 84 (F)   MR#: [id number]  Study Date: 4/2/2014  Location: O/PSonographer: Jesica Benjamin  Study quality: Technically good  Referring Physician: Chavo Whitlock MD  ------------------------------------------------------------------------  Blood Pressure: 163/80 mmHg  Height: 5ft 0in  Weight: 134 lb  BSA: 1.6 m2  ------------------------------------------------------------------------  PROCEDURE: Transthoracic echocardiogram with 2-D, M-Mode  and complete spectral and color flow Doppler.  INDICATION: Abnormal Echocardiogram/EKG (794.31)  ------------------------------------------------------------------------  DIMENSIONS:  Dimensions:     Normal Values:  LA:     2.6 cm    2.0 - 4.0 cm  Ao:     2.8 cm    2.0 - 3.8 cm  SEPTUM: 0.6 cm    0.6 - 1.2 cm  PWT:    0.6 cm    0.6 - 1.1 cm  LVIDd:  3.9 cm    3.0 - 5.6 cm  LVIDs:  1.8 cm    1.8 - 4.0 cm  Derived Variables:  LVMI: 39 g/m2  RWT: 0.30  Fractional short: 54 %  Ejection Fraction: 85 %  ------------------------------------------------------------------------  OBSERVATIONS:  Mitral Valve: Normal mitral valve.  Aortic Root: Normal aortic root.  Aortic Valve: Normal trileaflet aortic valve.  Left Atrium: Normal left atrium  Left Ventricle: Normal left ventricular internal dimensions  and wall thicknesses.  Hyperdynamic left ventricle.  Right Heart: Normal right atrium.  Normal right ventricular size and function.  Normal tricuspid valve. Moderate tricuspid regurgitation.  Normal pulmonic valve.  Estimated pulmonary artery systolic pressure equals 43 mm  Hg, assuming right atrial pressure equals 10  mm Hg,  consistent with mild pulmonary hypertension.  Pericardium/PleuraNormal pericardium with no pericardial  effusion.  Large cystic structure noted within the liver of unclear  etiology. Recommend dedicated sonography to evaluate.  ------------------------------------------------------------------------  CONCLUSIONS:  1. Normal left ventricular internal dimensions and wall  thicknesses.  2. Hyperdynamic left ventricle.  ------------------------------------------------------------------------  Confirmed on  4/2/2014 - 14:17:06 by Jesus Crain M.D.  ------------------------------------------------------------------------      [facility] -- Nitza Lozano,  Hugo, Adan Lindo, Mario Joe Cohen  Office # [phone]    Follow Up:    palpitations, PSVT's, PAT's    Subjective/Observations: No events overnight resting comfortably in bed.  No complaints of chest pain, dyspnea, or palpitations reported. No signs of orthopnea or PND.    REVIEW OF SYSTEMS: All other review of systems is negative unless indicated above  PAST MEDICAL & SURGICAL HISTORY:  HTN - Hypertension      Hypercholesteremia      Atrial tachycardia      Hypothyroid      S/P Cataract Surgery  B/L      Liver cyst  removed        MEDICATIONS  (STANDING):  enoxaparin Injectable 30 milliGRAM(s) SubCutaneous every 24 hours  levothyroxine 25 MICROGram(s) Oral daily  lisinopril 20 milliGRAM(s) Oral two times a day  multivitamin/minerals 1 Tablet(s) Oral daily  polyethylene glycol 3350 17 Gram(s) Oral daily  simvastatin 10 milliGRAM(s) Oral at bedtime  sotalol. 40 milliGRAM(s) Oral every 12 hours    MEDICATIONS  (PRN):  acetaminophen     Tablet .. 650 milliGRAM(s) Oral every 6 hours PRN Temp greater or equal to 38C (100.4F), Mild Pain (1 - 3)  melatonin 3 milliGRAM(s) Oral at bedtime PRN Insomnia    Allergies    penicillins (Unknown)    Intolerances      Vital Signs Last 24 Hrs  T(C): 36.4 (12 Jan 2024 05:12), Max: 36.8 (11 Jan 2024 12:39)  T(F): 97.6 (12 Jan 2024 05:12), Max: 98.3 (11 Jan 2024 12:39)  HR: 60 (12 Jan 2024 08:59) (55 - 69)  BP: 173/64 (12 Jan 2024 08:59) (150/70 - 188/79)  BP(mean): --  RR: 18 (12 Jan 2024 05:12) (18 - 18)  SpO2: 95% (12 Jan 2024 05:12) (93% - 97%)    Parameters below as of 12 Jan 2024 05:12  Patient On (Oxygen Delivery Method): room air      I&O's Summary    11 Jan 2024 07:01  -  12 Jan 2024 07:00  --------------------------------------------------------  IN: 0 mL / OUT: 2050 mL / NET: -2050 mL        TELE: SB- ST  PHYSICAL EXAM:  Constitutional: NAD, awake and alert  HEENT: Moist Mucous Membranes, Anicteric  Pulmonary: Non-labored, breath sounds are clear bilaterally, No wheezing, rales or rhonchi  Cardiovascular: babar,  Regular, S1 and S2, No murmurs, rubs, gallops or clicks  Gastrointestinal: Bowel Sounds present, soft, nontender.   Lymph: No peripheral edema. No lymphadenopathy.  Skin: No visible rashes or ulcers.  Psych:  Mood & affect appropriate    LABS: All Labs Reviewed:                        12.6 7.20  )-----------( 242      ( 12 Jan 2024 07:10 )             37.0     12 Jan 2024 07:10    134    |  101    |  25     ----------------------------<  105    4.5     |  26     |  0.81   11 Jan 2024 11:00    132    |  102    |  25     ----------------------------<  133    5.1     |  23     |  0.97     Ca    8.6        12 Jan 2024 07:10  Ca    8.6        11 Jan 2024 11:00  Phos  3.6       11 Jan 2024 11:00  Mg     2.3       11 Jan 2024 11:00    TPro  6.6    /  Alb  3.0    /  TBili  0.4    /  DBili  x      /  AST  25     /  ALT  33     /  AlkPhos  68     12 Jan 2024 07:10          12 Lead ECG:   Ventricular Rate 47 BPM    Atrial Rate 47 BPM    P-R Interval 166 ms    QRS Duration 100 ms    Q-T Interval 470 ms    QTC Calculation(Bazett) 415 ms    P Axis 38 degrees    R Axis -65 degrees    T Axis 55 degrees    Diagnosis Line Sinus bradycardia  Left anterior fascicular block  Minimal voltage criteria for LVH, may be normal variant ( Delano product )  Abnormal ECG  When compared with ECG of 10-ANGY-2024 08:54,  premature supraventricular complexes are no longer present  Vent. rate has decreased BY  44 BPM  QT has shortened  Confirmed by EVELIA LINDO ([id number]),  NAZANIN HAZEL ([id number]) on 1/11/2024 8:31:20 AM (01-11-24 @ 08:12)      Patient name: ROBERT BELLA  YOB: 1929   Age: 84 (F)   MR#: [id number]  Study Date: 4/2/2014  Location: O/PSonographer: Jesica Benjamin  Study quality: Technically good  Referring Physician: Chavo Whitlock MD  ------------------------------------------------------------------------  Blood Pressure: 163/80 mmHg  Height: 5ft 0in  Weight: 134 lb  BSA: 1.6 m2  ------------------------------------------------------------------------  PROCEDURE: Transthoracic echocardiogram with 2-D, M-Mode  and complete spectral and color flow Doppler.  INDICATION: Abnormal Echocardiogram/EKG (794.31)  ------------------------------------------------------------------------  DIMENSIONS:  Dimensions:     Normal Values:  LA:     2.6 cm    2.0 - 4.0 cm  Ao:     2.8 cm    2.0 - 3.8 cm  SEPTUM: 0.6 cm    0.6 - 1.2 cm  PWT:    0.6 cm    0.6 - 1.1 cm  LVIDd:  3.9 cm    3.0 - 5.6 cm  LVIDs:  1.8 cm    1.8 - 4.0 cm  Derived Variables:  LVMI: 39 g/m2  RWT: 0.30  Fractional short: 54 %  Ejection Fraction: 85 %  ------------------------------------------------------------------------  OBSERVATIONS:  Mitral Valve: Normal mitral valve.  Aortic Root: Normal aortic root.  Aortic Valve: Normal trileaflet aortic valve.  Left Atrium: Normal left atrium  Left Ventricle: Normal left ventricular internal dimensions  and wall thicknesses.  Hyperdynamic left ventricle.  Right Heart: Normal right atrium.  Normal right ventricular size and function.  Normal tricuspid valve. Moderate tricuspid regurgitation.  Normal pulmonic valve.  Estimated pulmonary artery systolic pressure equals 43 mm  Hg, assuming right atrial pressure equals 10  mm Hg,  consistent with mild pulmonary hypertension.  Pericardium/PleuraNormal pericardium with no pericardial  effusion.  Large cystic structure noted within the liver of unclear  etiology. Recommend dedicated sonography to evaluate.  ------------------------------------------------------------------------  CONCLUSIONS:  1. Normal left ventricular internal dimensions and wall  thicknesses.  2. Hyperdynamic left ventricle.  ------------------------------------------------------------------------  Confirmed on  4/2/2014 - 14:17:06 by Jesus Crain M.D.  ------------------------------------------------------------------------

## 2024-01-12 NOTE — PROGRESS NOTE ADULT - PROBLEM SELECTOR PLAN 1
Presented with intermittent palpitations, found to have PSVT,  - Monitor on Telemetry  - Cont sotalol with EKG monitoring for QTc prolongation (435ms on 1/12 AM EKG)  - Cardiology consult noted, changed to sotalol  - TTE EF 68%, with liver cyst seen

## 2024-01-12 NOTE — PROGRESS NOTE ADULT - PROBLEM SELECTOR PLAN 3
BP remains elevated  - cont lisinopril 20mg po bid  - add hydral 25mg tid per cardio recs  - cont sotalol with QTc monitoring as above  - monitor routine hemodynamics

## 2024-01-12 NOTE — PROGRESS NOTE ADULT - PROBLEM SELECTOR PLAN 2
Nacl supplement
Monitor metabolic panels  Avoid rapid overcorrection
Na is 132 today, repeat CMP  -NaCl supplement re-ordered
NaCl supplement
Na noted to be 133 on admission  -Given 500mL NS in ED   normalized
Na noted to be 133 on admission  -Given 500mL NS in ED   normalized

## 2024-01-12 NOTE — PROGRESS NOTE ADULT - PROBLEM SELECTOR PLAN 5
TSH 1.77 on admission  - Continue home levothyroxine

## 2024-01-12 NOTE — CASE MANAGEMENT PROGRESS NOTE - NSCMPROGRESSNOTE_GEN_ALL_CORE
Per MD not medically cleared for discharge today. CM met with patient at bedside to discuss possible weekend discharge. Patient advised referral sent to [facility] [phone] for home services. CM remains available.  Per MD not medically cleared for discharge today. CM met with patient at bedside to discuss possible weekend discharge. Patient advised referral sent to [facility] [phone] for home services. CM remains available.

## 2024-01-13 ENCOUNTER — TRANSCRIPTION ENCOUNTER (OUTPATIENT)
Age: 89
End: 2024-01-13

## 2024-01-13 VITALS — DIASTOLIC BLOOD PRESSURE: 74 MMHG | SYSTOLIC BLOOD PRESSURE: 116 MMHG

## 2024-01-13 LAB
ALBUMIN SERPL ELPH-MCNC: 2.7 G/DL — LOW (ref 3.3–5)
ALBUMIN SERPL ELPH-MCNC: 2.7 G/DL — LOW (ref 3.3–5)
ALP SERPL-CCNC: 59 U/L — SIGNIFICANT CHANGE UP (ref 40–120)
ALP SERPL-CCNC: 59 U/L — SIGNIFICANT CHANGE UP (ref 40–120)
ALT FLD-CCNC: 25 U/L — SIGNIFICANT CHANGE UP (ref 12–78)
ALT FLD-CCNC: 25 U/L — SIGNIFICANT CHANGE UP (ref 12–78)
ANION GAP SERPL CALC-SCNC: 5 MMOL/L — SIGNIFICANT CHANGE UP (ref 5–17)
ANION GAP SERPL CALC-SCNC: 5 MMOL/L — SIGNIFICANT CHANGE UP (ref 5–17)
AST SERPL-CCNC: 20 U/L — SIGNIFICANT CHANGE UP (ref 15–37)
AST SERPL-CCNC: 20 U/L — SIGNIFICANT CHANGE UP (ref 15–37)
BILIRUB SERPL-MCNC: 0.3 MG/DL — SIGNIFICANT CHANGE UP (ref 0.2–1.2)
BILIRUB SERPL-MCNC: 0.3 MG/DL — SIGNIFICANT CHANGE UP (ref 0.2–1.2)
BUN SERPL-MCNC: 29 MG/DL — HIGH (ref 7–23)
BUN SERPL-MCNC: 29 MG/DL — HIGH (ref 7–23)
CALCIUM SERPL-MCNC: 8.4 MG/DL — LOW (ref 8.5–10.1)
CALCIUM SERPL-MCNC: 8.4 MG/DL — LOW (ref 8.5–10.1)
CHLORIDE SERPL-SCNC: 101 MMOL/L — SIGNIFICANT CHANGE UP (ref 96–108)
CHLORIDE SERPL-SCNC: 101 MMOL/L — SIGNIFICANT CHANGE UP (ref 96–108)
CO2 SERPL-SCNC: 28 MMOL/L — SIGNIFICANT CHANGE UP (ref 22–31)
CO2 SERPL-SCNC: 28 MMOL/L — SIGNIFICANT CHANGE UP (ref 22–31)
CREAT SERPL-MCNC: 0.93 MG/DL — SIGNIFICANT CHANGE UP (ref 0.5–1.3)
CREAT SERPL-MCNC: 0.93 MG/DL — SIGNIFICANT CHANGE UP (ref 0.5–1.3)
EGFR: 57 ML/MIN/1.73M2 — LOW
EGFR: 57 ML/MIN/1.73M2 — LOW
GLUCOSE SERPL-MCNC: 92 MG/DL — SIGNIFICANT CHANGE UP (ref 70–99)
GLUCOSE SERPL-MCNC: 92 MG/DL — SIGNIFICANT CHANGE UP (ref 70–99)
HCT VFR BLD CALC: 36 % — SIGNIFICANT CHANGE UP (ref 34.5–45)
HCT VFR BLD CALC: 36 % — SIGNIFICANT CHANGE UP (ref 34.5–45)
HGB BLD-MCNC: 11.9 G/DL — SIGNIFICANT CHANGE UP (ref 11.5–15.5)
HGB BLD-MCNC: 11.9 G/DL — SIGNIFICANT CHANGE UP (ref 11.5–15.5)
MAGNESIUM SERPL-MCNC: 2.3 MG/DL — SIGNIFICANT CHANGE UP (ref 1.6–2.6)
MAGNESIUM SERPL-MCNC: 2.3 MG/DL — SIGNIFICANT CHANGE UP (ref 1.6–2.6)
MCHC RBC-ENTMCNC: 29.5 PG — SIGNIFICANT CHANGE UP (ref 27–34)
MCHC RBC-ENTMCNC: 29.5 PG — SIGNIFICANT CHANGE UP (ref 27–34)
MCHC RBC-ENTMCNC: 33.1 GM/DL — SIGNIFICANT CHANGE UP (ref 32–36)
MCHC RBC-ENTMCNC: 33.1 GM/DL — SIGNIFICANT CHANGE UP (ref 32–36)
MCV RBC AUTO: 89.3 FL — SIGNIFICANT CHANGE UP (ref 80–100)
MCV RBC AUTO: 89.3 FL — SIGNIFICANT CHANGE UP (ref 80–100)
NRBC # BLD: 0 /100 WBCS — SIGNIFICANT CHANGE UP (ref 0–0)
NRBC # BLD: 0 /100 WBCS — SIGNIFICANT CHANGE UP (ref 0–0)
PHOSPHATE SERPL-MCNC: 3.9 MG/DL — SIGNIFICANT CHANGE UP (ref 2.5–4.5)
PHOSPHATE SERPL-MCNC: 3.9 MG/DL — SIGNIFICANT CHANGE UP (ref 2.5–4.5)
PLATELET # BLD AUTO: 260 K/UL — SIGNIFICANT CHANGE UP (ref 150–400)
PLATELET # BLD AUTO: 260 K/UL — SIGNIFICANT CHANGE UP (ref 150–400)
POTASSIUM SERPL-MCNC: 4.8 MMOL/L — SIGNIFICANT CHANGE UP (ref 3.5–5.3)
POTASSIUM SERPL-MCNC: 4.8 MMOL/L — SIGNIFICANT CHANGE UP (ref 3.5–5.3)
POTASSIUM SERPL-SCNC: 4.8 MMOL/L — SIGNIFICANT CHANGE UP (ref 3.5–5.3)
POTASSIUM SERPL-SCNC: 4.8 MMOL/L — SIGNIFICANT CHANGE UP (ref 3.5–5.3)
PROT SERPL-MCNC: 6 G/DL — SIGNIFICANT CHANGE UP (ref 6–8.3)
PROT SERPL-MCNC: 6 G/DL — SIGNIFICANT CHANGE UP (ref 6–8.3)
RBC # BLD: 4.03 M/UL — SIGNIFICANT CHANGE UP (ref 3.8–5.2)
RBC # BLD: 4.03 M/UL — SIGNIFICANT CHANGE UP (ref 3.8–5.2)
RBC # FLD: 14.2 % — SIGNIFICANT CHANGE UP (ref 10.3–14.5)
RBC # FLD: 14.2 % — SIGNIFICANT CHANGE UP (ref 10.3–14.5)
SODIUM SERPL-SCNC: 134 MMOL/L — LOW (ref 135–145)
SODIUM SERPL-SCNC: 134 MMOL/L — LOW (ref 135–145)
WBC # BLD: 8.46 K/UL — SIGNIFICANT CHANGE UP (ref 3.8–10.5)
WBC # BLD: 8.46 K/UL — SIGNIFICANT CHANGE UP (ref 3.8–10.5)
WBC # FLD AUTO: 8.46 K/UL — SIGNIFICANT CHANGE UP (ref 3.8–10.5)
WBC # FLD AUTO: 8.46 K/UL — SIGNIFICANT CHANGE UP (ref 3.8–10.5)

## 2024-01-13 PROCEDURE — 36415 COLL VENOUS BLD VENIPUNCTURE: CPT

## 2024-01-13 PROCEDURE — 99239 HOSP IP/OBS DSCHRG MGMT >30: CPT

## 2024-01-13 PROCEDURE — 85025 COMPLETE CBC W/AUTO DIFF WBC: CPT

## 2024-01-13 PROCEDURE — 82550 ASSAY OF CK (CPK): CPT

## 2024-01-13 PROCEDURE — 84100 ASSAY OF PHOSPHORUS: CPT

## 2024-01-13 PROCEDURE — 85610 PROTHROMBIN TIME: CPT

## 2024-01-13 PROCEDURE — 87637 SARSCOV2&INF A&B&RSV AMP PRB: CPT

## 2024-01-13 PROCEDURE — 80053 COMPREHEN METABOLIC PANEL: CPT

## 2024-01-13 PROCEDURE — 87077 CULTURE AEROBIC IDENTIFY: CPT

## 2024-01-13 PROCEDURE — 71275 CT ANGIOGRAPHY CHEST: CPT | Mod: QQ

## 2024-01-13 PROCEDURE — 97165 OT EVAL LOW COMPLEX 30 MIN: CPT

## 2024-01-13 PROCEDURE — 99285 EMERGENCY DEPT VISIT HI MDM: CPT

## 2024-01-13 PROCEDURE — 83735 ASSAY OF MAGNESIUM: CPT

## 2024-01-13 PROCEDURE — 80061 LIPID PANEL: CPT

## 2024-01-13 PROCEDURE — 93005 ELECTROCARDIOGRAM TRACING: CPT

## 2024-01-13 PROCEDURE — 93306 TTE W/DOPPLER COMPLETE: CPT

## 2024-01-13 PROCEDURE — 97162 PT EVAL MOD COMPLEX 30 MIN: CPT

## 2024-01-13 PROCEDURE — 81001 URINALYSIS AUTO W/SCOPE: CPT

## 2024-01-13 PROCEDURE — 83880 ASSAY OF NATRIURETIC PEPTIDE: CPT

## 2024-01-13 PROCEDURE — 83690 ASSAY OF LIPASE: CPT

## 2024-01-13 PROCEDURE — 85027 COMPLETE CBC AUTOMATED: CPT

## 2024-01-13 PROCEDURE — 99233 SBSQ HOSP IP/OBS HIGH 50: CPT

## 2024-01-13 PROCEDURE — 97110 THERAPEUTIC EXERCISES: CPT

## 2024-01-13 PROCEDURE — 83605 ASSAY OF LACTIC ACID: CPT

## 2024-01-13 PROCEDURE — 84484 ASSAY OF TROPONIN QUANT: CPT

## 2024-01-13 PROCEDURE — 87086 URINE CULTURE/COLONY COUNT: CPT

## 2024-01-13 PROCEDURE — 80048 BASIC METABOLIC PNL TOTAL CA: CPT

## 2024-01-13 PROCEDURE — 97530 THERAPEUTIC ACTIVITIES: CPT

## 2024-01-13 PROCEDURE — 82553 CREATINE MB FRACTION: CPT

## 2024-01-13 PROCEDURE — 84443 ASSAY THYROID STIM HORMONE: CPT

## 2024-01-13 PROCEDURE — 97116 GAIT TRAINING THERAPY: CPT

## 2024-01-13 PROCEDURE — 85730 THROMBOPLASTIN TIME PARTIAL: CPT

## 2024-01-13 PROCEDURE — 97535 SELF CARE MNGMENT TRAINING: CPT

## 2024-01-13 PROCEDURE — 71045 X-RAY EXAM CHEST 1 VIEW: CPT

## 2024-01-13 RX ORDER — LISINOPRIL 2.5 MG/1
1 TABLET ORAL
Qty: 0 | Refills: 0 | DISCHARGE
Start: 2024-01-13

## 2024-01-13 RX ORDER — METOPROLOL TARTRATE 50 MG
1 TABLET ORAL
Qty: 0 | Refills: 0 | DISCHARGE

## 2024-01-13 RX ORDER — SOTALOL HCL 120 MG
0.5 TABLET ORAL
Qty: 60 | Refills: 0
Start: 2024-01-13

## 2024-01-13 RX ORDER — DILTIAZEM HCL 120 MG
1 CAPSULE, EXT RELEASE 24 HR ORAL
Refills: 0 | DISCHARGE

## 2024-01-13 RX ORDER — FOSINOPRIL SODIUM 10 MG/1
1 TABLET ORAL
Refills: 0 | DISCHARGE

## 2024-01-13 RX ORDER — AMLODIPINE BESYLATE 2.5 MG/1
5 TABLET ORAL DAILY
Refills: 0 | Status: DISCONTINUED | OUTPATIENT
Start: 2024-01-13 | End: 2024-01-13

## 2024-01-13 RX ADMIN — Medication 1 TABLET(S): at 11:30

## 2024-01-13 RX ADMIN — ENOXAPARIN SODIUM 30 MILLIGRAM(S): 100 INJECTION SUBCUTANEOUS at 05:51

## 2024-01-13 RX ADMIN — Medication 25 MICROGRAM(S): at 05:51

## 2024-01-13 RX ADMIN — Medication 40 MILLIGRAM(S): at 09:46

## 2024-01-13 NOTE — PROGRESS NOTE ADULT - ASSESSMENT
89 yo female w/ pmh of Atrial Tachycardia, Htn, Hld, Hypothyrodism, anxiety presents with 3 days of intermittent palpitations.    Palpitations/PSVT/PAT  - Presented with symptomatic PSVT's  and PAT's  - No further atrial arrhythmias on tele, now off  - HR controlled at 50's-90's  - Continue Sotalol 40mg q12 hrs  - Continue off home Cardizem and Toprol .   - EKG with baseline , QTC wnl 1/12  - Unable to utilize Amio due to dizziness  - Continue statin    - No signs of significant ischemia or volume overload.   - EKG nonischemic  - Appears compensated from HF POV.  Non-orthopneic on RA  - TTE normal LV systolic function, EF 68%.  No significant valvular disease noted    - Bp was elevated up to systolic 180's.  Started on Hydralazine 25 mg q8H, 1/12, but BP dropped to 100's today, though, likely an outlier  - Would D/C Hydralazine and start Norvasc 5 mg daily.  Recommend obtaining BP's manually  - Would continue off Clonidine for bradycardia    - Monitor and replete lytes, keep K>4, Mg>2.  - Will continue to follow.    Lety Douglas DNP, NP-C, AGACNP-C  Cardiology   Call TEAMS        89 yo female w/ pmh of Atrial Tachycardia, Htn, Hld, Hypothyrodism, anxiety presents with 3 days of intermittent palpitations.    Palpitations/PSVT/PAT  - Presented with symptomatic PSVT's  and PAT's  - No further atrial arrhythmias on tele, now off  - HR controlled at 50's-90's  - Continue Sotalol 40mg q12 hrs  - Continue off home Cardizem and Toprol .   - EKG with baseline , QTC wnl 1/12  - Unable to utilize Amio due to dizziness  - Continue statin    - No signs of significant ischemia or volume overload.   - EKG nonischemic  - Appears compensated from HF POV.  Non-orthopneic on RA  - TTE normal LV systolic function, EF 68%.  No significant valvular disease noted    - Bp was elevated up to systolic 180's.  Started on Hydralazine 25 mg q8H, 1/12, but BP dropped to 100's today, though, likely an outlier  - Would D/C Hydralazine and cont ace   - Would continue off Clonidine for bradycardia    - Monitor and replete lytes, keep K>4, Mg>2.  - Will continue to follow.    Lety Douglas DNP, NP-C, AGACNP-C  Cardiology   Call TEAMS

## 2024-01-13 NOTE — PROGRESS NOTE ADULT - REASON FOR ADMISSION
Palpitations

## 2024-01-13 NOTE — CASE MANAGEMENT PROGRESS NOTE - NSCMPROGRESSNOTE_GEN_ALL_CORE
Per MD, patient is medically cleared for discharge home today.  CM met with patient and daughter Delphine at bedside to discussed discharge disposition home with [facility]. Discharge notice signed and copy given to patient.  Daughter will transport patient home. Patient and daughter verbalized understanding of the transition plan and is in agreement.  CM remains available throughout hospital stay.   Per MD, patient is medically cleared for discharge home today.  CM met with patient and daughter Delphine at bedside to discussed discharge disposition home with [facility]. Discharge notice signed and copy given to patient.  Daughter will transport patient home. Patient and daughter verbalized understanding of the transition plan and is in agreement.  CM remains available throughout hospital stay.

## 2024-01-13 NOTE — PROGRESS NOTE ADULT - NS ATTEND AMEND GEN_ALL_CORE FT
89 yo female w/ pmh of Atrial Tachycardia, Htn, Hld, Hypothyrodism, anxiety presents with 3 days of intermittent palpitations.    Palpitations/PSVT/PAT  - Presented with symptomatic PSVT's  and PAT's  - No further atrial arrhythmias on tele, now off  - HR controlled at 50's-90's  - Continue Sotalol 40mg q12 hrs  - Continue off home Cardizem and Toprol .   - EKG with baseline , QTC wnl 1/12  - Unable to utilize Amio due to dizziness  - Continue statin    - No signs of significant ischemia or volume overload.   - EKG nonischemic  - Appears compensated from HF POV.  Non-orthopneic on RA  - TTE normal LV systolic function, EF 68%.  No significant valvular disease noted    - Bp was elevated, but then dropped, and would rather she be slightly hypertensive rather than hypo  -cont ace, stop hydralazine   -would continue off Clonidine for bradycardia    -dc planning

## 2024-01-13 NOTE — DIETITIAN INITIAL EVALUATION ADULT - OTHER INFO
89 yo female w/ PMH of Atrial Tachycardia, HTN, HLD, Hypothyroidism and Anxiety, admitted for palpitations.       Problem/Plan - 1:  ·  Problem: Palpitations.   ·  Plan: Presented with intermittent palpitations, found to have PSVT,  - Monitor on Telemetry  - Cont sotalol with EKG monitoring for QTc prolongation (435ms on 1/12 AM EKG)  - Cardiology consult noted, changed to sotalol  - TTE EF 68%, with liver cyst seen.     Problem/Plan - 2:  ·  Problem: Hyponatremia.   ·  Plan: Monitor metabolic panels  Avoid rapid overcorrection.     Problem/Plan - 3:  ·  Problem: HTN (hypertension).   ·  Plan: BP remains elevated  - cont lisinopril 20mg po bid  - add hydral 25mg tid per cardio recs  - cont sotalol with QTc monitoring as above  - monitor routine hemodynamics.     Problem/Plan - 4:  ·  Problem: HLD (hyperlipidemia).   ·  Plan: Continue home simvastatin  - Lipid Panel level elevated  - Outpatient follow up.     Problem/Plan - 5:  ·  Problem: Hypothyroidism.   ·  Plan: TSH 1.77 on admission  - Continue home levothyroxine.     Problem/Plan - 6:  ·  Problem: Need for prophylactic measure.   ·  Plan: DVT PPx: Lovenox 30mg subq daily.     89 yo female w/ PMH of Atrial Tachycardia, HTN, HLD, Hypothyroidism and Anxiety, admitted for palpitations.       Problem/Plan - 1:  ·  Problem: Palpitations.   ·  Plan: Presented with intermittent palpitations, found to have PSVT,  - Monitor on Telemetry  - Cont sotalol with EKG monitoring for QTc prolongation (435ms on 1/12 AM EKG)  - Cardiology consult noted, changed to sotalol  - TTE EF 68%, with liver cyst seen.     Problem/Plan - 2:  ·  Problem: Hyponatremia.   ·  Plan: Monitor metabolic panels  Avoid rapid overcorrection.     Problem/Plan - 3:  ·  Problem: HTN (hypertension).   ·  Plan: BP remains elevated  - cont lisinopril 20mg po bid  - add hydral 25mg tid per cardio recs  - cont sotalol with QTc monitoring as above  - monitor routine hemodynamics.     Problem/Plan - 4:  ·  Problem: HLD (hyperlipidemia).   ·  Plan: Continue home simvastatin  - Lipid Panel level elevated  - Outpatient follow up.     Problem/Plan - 5:  ·  Problem: Hypothyroidism.   ·  Plan: TSH 1.77 on admission  - Continue home levothyroxine.     Problem/Plan - 6:  ·  Problem: Need for prophylactic measure.   ·  Plan: DVT PPx: Lovenox 30mg subq daily.     89 yo female w/ PMH of Atrial Tachycardia, HTN, HLD, Hypothyroidism and Anxiety, admitted for palpitations - with symptomatic PSVT's  and PAT's  No further atrial arrhythmias on tele, now off       89 yo female w/ PMH of Atrial Tachycardia, HTN, HLD, Hypothyroidism and Anxiety, admitted for palpitations - with symptomatic PSVT's  and PAT's  No further atrial arrhythmias on tele, now off       89 yo female w/ PMH of Atrial Tachycardia, HTN, HLD, Hypothyroidism and Anxiety, admitted for palpitations - with symptomatic PSVT's  and PAT's  No further atrial arrhythmias on tele, now off.    At time of RD visit to pts bedside, states she is 4'11" used to weigh 117# but thinks she is 111# since starting on synthroid, reports good appetite and intake for meals.

## 2024-01-13 NOTE — DISCHARGE NOTE NURSING/CASE MANAGEMENT/SOCIAL WORK - PATIENT PORTAL LINK FT
You can access the FollowMyHealth Patient Portal offered by [facility] by registering at the following website: [email or web address]. By joining [employer]’s FollowMyHealth portal, you will also be able to view your health information using other applications (apps) compatible with our system. You can access the FollowMyHealth Patient Portal offered by [facility] by registering at the following website: [email or web address]. By joining [employer]’s FollowMyHealth portal, you will also be able to view your health information using other applications (apps) compatible with our system.

## 2024-01-13 NOTE — DIETITIAN INITIAL EVALUATION ADULT - PERTINENT LABORATORY DATA
01-12    134<L>  |  101  |  25<H>  ----------------------------<  105<H>  4.5   |  26  |  0.81    Ca    8.6      12 Jan 2024 07:10  Phos  3.6     01-11  Mg     2.3     01-11    TPro  6.6  /  Alb  3.0<L>  /  TBili  0.4  /  DBili  x   /  AST  25  /  ALT  33  /  AlkPhos  68  01-12

## 2024-01-13 NOTE — DIETITIAN INITIAL EVALUATION ADULT - PROBLEM SELECTOR PLAN 1
H/o intermittent palpitations x3 days, instructed to take an extra dose of metoprolol tartrate 50mg yesterday and today  -CTA: Negative for PE, negative for lung consolidation  -EKG: NSR @ 117bpm  -No CP, SOB associated   -Patient currently in and out of tachycardia/bradycardia (123bpm/59bpm) respectively so hold off increased metoprolol at this time  -5mg IV Lopressor push for persistent tachycardia if necessary  -Continue home diltiazem, metoprolol tartrate  -Monitor on Telemetry  -Monitor routine hemodynamics  -Cardiology consulted ([facility]), f/u recs

## 2024-01-13 NOTE — DIETITIAN INITIAL EVALUATION ADULT - PERTINENT MEDS FT
MEDICATIONS  (STANDING):  enoxaparin Injectable 30 milliGRAM(s) SubCutaneous every 24 hours  hydrALAZINE 25 milliGRAM(s) Oral three times a day  levothyroxine 25 MICROGram(s) Oral daily  lisinopril 20 milliGRAM(s) Oral two times a day  multivitamin/minerals 1 Tablet(s) Oral daily  polyethylene glycol 3350 17 Gram(s) Oral daily  simvastatin 10 milliGRAM(s) Oral at bedtime  sotalol. 40 milliGRAM(s) Oral every 12 hours    MEDICATIONS  (PRN):  acetaminophen     Tablet .. 650 milliGRAM(s) Oral every 6 hours PRN Temp greater or equal to 38C (100.4F), Mild Pain (1 - 3)  melatonin 3 milliGRAM(s) Oral at bedtime PRN Insomnia

## 2024-01-13 NOTE — DISCHARGE NOTE NURSING/CASE MANAGEMENT/SOCIAL WORK - NSSCCONTNUM_GEN_ALL_CORE
[facility] Home care agency [phone] or [phone] will reach out to you within 24-72 hours of your discharge to schedule home care visit/eval appointment with you. Please call agency for any queries regarding home care services   [facility] Home care agency [phone] or [phone] will reach out to you within 24-72 hours of your discharge to schedule home care visit/eval appointment with you. Please call agency for any queries regarding home care services

## 2024-01-13 NOTE — PROGRESS NOTE ADULT - SUBJECTIVE AND OBJECTIVE BOX
[facility] -- Hugo Goddard, Sima, Adan, Heath, , Mario Garcia  Office # [phone]    Follow Up:    palpitations, PSVT's, PAT's      Subjective/Observations: Non-orthopneic on RA.  Denies dizziness, lightheadedness, SOB, SIMENTAL, orthopnea, CP or palpitations    REVIEW OF SYSTEMS: All other review of systems is negative unless indicated above  PAST MEDICAL & SURGICAL HISTORY:  HTN - Hypertension  Hypercholesteremia  Atrial tachycardia  Hypothyroid  S/P Cataract Surgery  B/L  Liver cyst  removed    MEDICATIONS  (STANDING):  enoxaparin Injectable 30 milliGRAM(s) SubCutaneous every 24 hours  levothyroxine 25 MICROGram(s) Oral daily  lisinopril 20 milliGRAM(s) Oral two times a day  multivitamin/minerals 1 Tablet(s) Oral daily  polyethylene glycol 3350 17 Gram(s) Oral daily  simvastatin 10 milliGRAM(s) Oral at bedtime  sotalol. 40 milliGRAM(s) Oral every 12 hours    MEDICATIONS  (PRN):  acetaminophen     Tablet .. 650 milliGRAM(s) Oral every 6 hours PRN Temp greater or equal to 38C (100.4F), Mild Pain (1 - 3)  melatonin 3 milliGRAM(s) Oral at bedtime PRN Insomnia    Allergies    penicillins (Unknown)    Intolerances    Vital Signs Last 24 Hrs  T(C): 36.6 (13 Jan 2024 09:44), Max: 36.8 (12 Jan 2024 21:35)  T(F): 97.9 (13 Jan 2024 09:44), Max: 98.2 (12 Jan 2024 21:35)  HR: 61 (13 Jan 2024 09:44) (56 - 98)  BP: 159/73 (13 Jan 2024 09:44) (103/70 - 169/96)  BP(mean): --  RR: 18 (13 Jan 2024 09:44) (18 - 18)  SpO2: 96% (13 Jan 2024 09:44) (92% - 96%)    Parameters below as of 13 Jan 2024 09:44  Patient On (Oxygen Delivery Method): room air      I&O's Summary    12 Jan 2024 07:01  -  13 Jan 2024 07:00  --------------------------------------------------------  IN: 0 mL / OUT: 275 mL / NET: -275 mL    13 Jan 2024 07:01  -  13 Jan 2024 10:59  --------------------------------------------------------  IN: 240 mL / OUT: 0 mL / NET: 240 mL      PHYSICAL EXAM:  TELE: Not on tele  Constitutional: NAD, awake and alert, well-developed  HEENT: Moist Mucous Membranes, Anicteric  Pulmonary: Non-labored, breath sounds are clear bilaterally, No wheezing, rales or rhonchi  Cardiovascular: Regular, S1 and S2, No murmurs, rubs, gallops or clicks  Gastrointestinal: Bowel Sounds present, soft, nontender.   Lymph: No peripheral edema. No lymphadenopathy.  Skin: No visible rashes or ulcers.  Psych:  Mood & affect appropriate, anxious  LABS: All Labs Reviewed:                        11.9   8.46  )-----------( 260      ( 13 Jan 2024 07:50 )             36.0                         12.6   7.20  )-----------( 242      ( 12 Jan 2024 07:10 )             37.0     13 Jan 2024 07:50    134    |  101    |  29     ----------------------------<  92     4.8     |  28     |  0.93   12 Jan 2024 07:10    134    |  101    |  25     ----------------------------<  105    4.5     |  26     |  0.81   11 Jan 2024 11:00    132    |  102    |  25     ----------------------------<  133    5.1     |  23     |  0.97     Ca    8.4        13 Jan 2024 07:50  Ca    8.6        12 Jan 2024 07:10  Ca    8.6        11 Jan 2024 11:00  Phos  3.9       13 Jan 2024 07:50  Phos  3.6       11 Jan 2024 11:00  Mg     2.3       13 Jan 2024 07:50  Mg     2.3       11 Jan 2024 11:00    TPro  6.0    /  Alb  2.7    /  TBili  0.3    /  DBili  x      /  AST  20     /  ALT  25     /  AlkPhos  59     13 Jan 2024 07:50  TPro  6.6    /  Alb  3.0    /  TBili  0.4    /  DBili  x      /  AST  25     /  ALT  33     /  AlkPhos  68     12 Jan 2024 07:10    12 Lead ECG:   Ventricular Rate 47 BPM    Atrial Rate 47 BPM    P-R Interval 176 ms    QRS Duration 92 ms    Q-T Interval 492 ms    QTC Calculation(Bazett) 435 ms    P Axis 50 degrees    R Axis -58 degrees    T Axis 23 degrees    Diagnosis Line Sinus bradycardia  Incomplete right bundle branch block  Left anterior fascicular block  Septal infarct , age undetermined  Abnormal ECG  When compared with ECG of 11-JAN-2024 08:12,  Septal infarct is now present  Confirmed by Gabriel Arias MD ([id number]) on 1/12/2024 8:25:50 PM (01-12-24 @ 02:41)    TRANSTHORACIC ECHOCARDIOGRAM REPORT  ________________________________________________________________________________                                      _______    Pt. Name:       ROBERT BELLA Study Date:    1/11/2024  MRN:            [id number]       YOB: 1929  Accession #:    [id number]      Age:           89 years  Account#:       [id number]     Gender:        F  Heart Rate:                    Height:        59.06 in (150.00 cm)  Rhythm:                        Weight:   110.23 lb (50.00 kg)  Blood Pressure: 157/77 mmHg    BSA/BMI:       1.43 m² / 22.22 kg/m²  ________________________________________________________________________________________  Referring Physician:    Mahendra Greene  Interpreting Physician: Ap Arellano  Primary Sonographer:    Donal Orozco    CPT:               ECHO TTE WO CON COMP W DOPP - 89392.m  Indication(s):     Abnormal electrocardiogram ECG/EKG - R94.31  Procedure:         Transthoracic echocardiogram with 2-D, M-mode and complete                 spectral and color flow Doppler.  Ordering Location: [facility]  Admission Status:  Inpatient  Study Information: Image quality for this study is technically difficult.  _______________________________________________________________________________________     CONCLUSIONS:      1. Technically difficult image quality.   2. Left ventricular systolic function is normal with an ejection fraction of 68 % by Hinton's method of disks.   3. There is normal LV mass and concentric remodeling.   4. Normal right ventricular cavity size and normal systolic function.   5. The left atrium is normal.   6. Mitral valve leaflets have focal calcification.   7. Mild mitral regurgitation.   8. Trileaflet aortic valve with normal systolic excursion. There is focal calcification of the aortic valve leaflets.   9. Mild aortic regurgitation.  10. Estimated pulmonary artery systolic pressure is 35 mmHg, consistent with normal pulmonary artery pressure.  11. Echo-free space is noted in the liver consistent with cyst, however, dedicated imaging recommended for further evaluation if clinically indicated.    ________________________________________________________________________________________  FINDINGS:     Left Ventricle:  Left ventricular systolic function is normal with a calculated ejection fraction of 68 % by the Hinton's biplane method of disks. There is normal LV mass and concentric remodeling.     Right Ventricle:  The right ventricular cavity is normal in size and normal systolic function. Tricuspid annular plane systolic excursion (TAPSE) is 2.4 cm (normal >=1.7 cm).     Left Atrium:  The left atrium is normal with an indexed volume of 21.08 ml/m².     Right Atrium:  The right atrium is normal in size.     Aortic Valve:  The aortic valve appears trileaflet with normal systolic excursion. There is focal calcification of the aortic valve leaflets. There is mild aortic regurgitation.     Mitral Valve:  Mitral valve leaflets have focal calcification. There is mild mitral regurgitation.     Tricuspid Valve:  Structurally normal tricuspid valve with normal leaflet excursion. There is mild to moderate tricuspid regurgitation. Estimated pulmonary artery systolic pressure is 35 mmHg, consistent with normal pulmonary artery pressure.     Pulmonic Valve:  The pulmonic valve was not well visualized. There is mild to moderate pulmonic regurgitation.     Aorta:  The aortic root at the sinuses of Valsalva is normal in size, measuring 2.50 cm (indexed 1.75 cm/m²). The ascending aortadiameter is normal in size, measuring 2.30 cm (indexed 1.61 cm/m²).     Extra-cardiac Findings:  Echo-free space is noted in the liver consistent with cyst, however, dedicated imaging recommended for further evaluation if clinically indicated.     Systemic Veins:  The inferior vena cava is normal in size measuring 0.99 cm in diameter, (normal <2.1cm) with normal inspiratory collapse (normal >50%) consistent with normal right atrial pressure (~3, range 0-5mmHg).  ____________________________________________________________________  QUANTITATIVE DATA:  Left Ventricle Measurements: (Indexed to BSA)     IVSd (2D):   1.0 cm  LVPWd (2D):  0.9 cm  LVIDd (2D):  3.5 cm  LVIDs (2D):  2.1 cm  LV Mass:     93 g   64.6 g/m²  LV Vol d, MOD A2C: 31.9 ml 22.27 ml/m²  LV Vol d, MOD A4C: 43.4 ml 30.30 ml/m²  LV Vol d, MOD BP:  37.7 ml 26.29 ml/m²  LV Vol s, MOD A2C: 10.1 ml 7.05 ml/m²  LV Vol s, MOD A4C: 14.6 ml 10.19 ml/m²  LV Vol s, MOD BP:  11.9 ml 8.29 ml/m²  LVOT SV MOD BP:    25.8 ml  LV EF% MOD BP:   68 %     MV E Vmax:    1.00 m/s  MV A Vmax:    1.28 m/s  MV E/A:       0.78  e' lateral:   22.40 cm/s  e' medial:    14.60 cm/s  E/e' lateral: 4.45  E/e' medial:  6.82  E/e' Average: 5.38  MV DT:        278 msec    Aorta Measurements: (normal range) (Indexed to BSA)     Sinuses of Valsalva: 2.50 cm (2.7 - 3.3 cm)  Ao Asc prox:         2.30 cm       Left Atrium Measurements: (Indexed to BSA)  LA Diam 2D: 3.30 cm    Right Ventricle Measurements:     TAPSE:            2.4 cm  RV Base (RVID1):  2.7 cm  RV Mid (RVID2):   2.7 cm  RV Major (RVID3): 6.2 cm       LVOT / RVOT/ Qp/Qs Data: (Indexed to BSA)  LVOT Diameter: 1.50 cm    Mitral Valve Measurements:     MV E Vmax: 1.0 m/s  MV A Vmax: 1.3 m/s  MV E/A:    0.8    Tricuspid Valve Measurements:     TR Vmax:          2.8 m/s  TR Peak Gradient: 31.8 mmHg  RA Pressure:      3 mmHg  PASP:             35 mmHg  _____________________________________________________________________________________  Electronically signed on 1/11/2024 at 5:21:08 PM by Ap Arellano     *** Final ***      [facility] -- Hugo Goddard, Sima, Adan, Heath, , Mario Garcia  Office # [phone]    Follow Up:    palpitations, PSVT's, PAT's      Subjective/Observations: Non-orthopneic on RA.  Denies dizziness, lightheadedness, SOB, SIMENTAL, orthopnea, CP or palpitations    REVIEW OF SYSTEMS: All other review of systems is negative unless indicated above  PAST MEDICAL & SURGICAL HISTORY:  HTN - Hypertension  Hypercholesteremia  Atrial tachycardia  Hypothyroid  S/P Cataract Surgery  B/L  Liver cyst  removed    MEDICATIONS  (STANDING):  enoxaparin Injectable 30 milliGRAM(s) SubCutaneous every 24 hours  levothyroxine 25 MICROGram(s) Oral daily  lisinopril 20 milliGRAM(s) Oral two times a day  multivitamin/minerals 1 Tablet(s) Oral daily  polyethylene glycol 3350 17 Gram(s) Oral daily  simvastatin 10 milliGRAM(s) Oral at bedtime  sotalol. 40 milliGRAM(s) Oral every 12 hours    MEDICATIONS  (PRN):  acetaminophen     Tablet .. 650 milliGRAM(s) Oral every 6 hours PRN Temp greater or equal to 38C (100.4F), Mild Pain (1 - 3)  melatonin 3 milliGRAM(s) Oral at bedtime PRN Insomnia    Allergies    penicillins (Unknown)    Intolerances    Vital Signs Last 24 Hrs  T(C): 36.6 (13 Jan 2024 09:44), Max: 36.8 (12 Jan 2024 21:35)  T(F): 97.9 (13 Jan 2024 09:44), Max: 98.2 (12 Jan 2024 21:35)  HR: 61 (13 Jan 2024 09:44) (56 - 98)  BP: 159/73 (13 Jan 2024 09:44) (103/70 - 169/96)  BP(mean): --  RR: 18 (13 Jan 2024 09:44) (18 - 18)  SpO2: 96% (13 Jan 2024 09:44) (92% - 96%)    Parameters below as of 13 Jan 2024 09:44  Patient On (Oxygen Delivery Method): room air      I&O's Summary    12 Jan 2024 07:01  -  13 Jan 2024 07:00  --------------------------------------------------------  IN: 0 mL / OUT: 275 mL / NET: -275 mL    13 Jan 2024 07:01  -  13 Jan 2024 10:59  --------------------------------------------------------  IN: 240 mL / OUT: 0 mL / NET: 240 mL      PHYSICAL EXAM:  TELE: Not on tele  Constitutional: NAD, awake and alert, well-developed  HEENT: Moist Mucous Membranes, Anicteric  Pulmonary: Non-labored, breath sounds are clear bilaterally, No wheezing, rales or rhonchi  Cardiovascular: Regular, S1 and S2, No murmurs, rubs, gallops or clicks  Gastrointestinal: Bowel Sounds present, soft, nontender.   Lymph: No peripheral edema. No lymphadenopathy.  Skin: No visible rashes or ulcers.  Psych:  Mood & affect appropriate, anxious  LABS: All Labs Reviewed:                        11.9   8.46  )-----------( 260      ( 13 Jan 2024 07:50 )             36.0                         12.6   7.20  )-----------( 242      ( 12 Jan 2024 07:10 )             37.0     13 Jan 2024 07:50    134    |  101    |  29     ----------------------------<  92     4.8     |  28     |  0.93   12 Jan 2024 07:10    134    |  101    |  25     ----------------------------<  105    4.5     |  26     |  0.81   11 Jan 2024 11:00    132    |  102    |  25     ----------------------------<  133    5.1     |  23     |  0.97     Ca    8.4        13 Jan 2024 07:50  Ca    8.6        12 Jan 2024 07:10  Ca    8.6        11 Jan 2024 11:00  Phos  3.9       13 Jan 2024 07:50  Phos  3.6       11 Jan 2024 11:00  Mg     2.3       13 Jan 2024 07:50  Mg     2.3       11 Jan 2024 11:00    TPro  6.0    /  Alb  2.7    /  TBili  0.3    /  DBili  x      /  AST  20     /  ALT  25     /  AlkPhos  59     13 Jan 2024 07:50  TPro  6.6    /  Alb  3.0    /  TBili  0.4    /  DBili  x      /  AST  25     /  ALT  33     /  AlkPhos  68     12 Jan 2024 07:10    12 Lead ECG:   Ventricular Rate 47 BPM    Atrial Rate 47 BPM    P-R Interval 176 ms    QRS Duration 92 ms    Q-T Interval 492 ms    QTC Calculation(Bazett) 435 ms    P Axis 50 degrees    R Axis -58 degrees    T Axis 23 degrees    Diagnosis Line Sinus bradycardia  Incomplete right bundle branch block  Left anterior fascicular block  Septal infarct , age undetermined  Abnormal ECG  When compared with ECG of 11-JAN-2024 08:12,  Septal infarct is now present  Confirmed by Gabriel Arias MD ([id number]) on 1/12/2024 8:25:50 PM (01-12-24 @ 02:41)    TRANSTHORACIC ECHOCARDIOGRAM REPORT  ________________________________________________________________________________                                      _______    Pt. Name:       ROBERT BELLA Study Date:    1/11/2024  MRN:            [id number]       YOB: 1929  Accession #:    [id number]      Age:           89 years  Account#:       [id number]     Gender:        F  Heart Rate:                    Height:        59.06 in (150.00 cm)  Rhythm:                        Weight:   110.23 lb (50.00 kg)  Blood Pressure: 157/77 mmHg    BSA/BMI:       1.43 m² / 22.22 kg/m²  ________________________________________________________________________________________  Referring Physician:    Mahendra Greene  Interpreting Physician: Ap Arellano  Primary Sonographer:    Donal Orozco    CPT:               ECHO TTE WO CON COMP W DOPP - 84658.m  Indication(s):     Abnormal electrocardiogram ECG/EKG - R94.31  Procedure:         Transthoracic echocardiogram with 2-D, M-mode and complete                 spectral and color flow Doppler.  Ordering Location: [facility]  Admission Status:  Inpatient  Study Information: Image quality for this study is technically difficult.  _______________________________________________________________________________________     CONCLUSIONS:      1. Technically difficult image quality.   2. Left ventricular systolic function is normal with an ejection fraction of 68 % by Hinton's method of disks.   3. There is normal LV mass and concentric remodeling.   4. Normal right ventricular cavity size and normal systolic function.   5. The left atrium is normal.   6. Mitral valve leaflets have focal calcification.   7. Mild mitral regurgitation.   8. Trileaflet aortic valve with normal systolic excursion. There is focal calcification of the aortic valve leaflets.   9. Mild aortic regurgitation.  10. Estimated pulmonary artery systolic pressure is 35 mmHg, consistent with normal pulmonary artery pressure.  11. Echo-free space is noted in the liver consistent with cyst, however, dedicated imaging recommended for further evaluation if clinically indicated.    ________________________________________________________________________________________  FINDINGS:     Left Ventricle:  Left ventricular systolic function is normal with a calculated ejection fraction of 68 % by the Hinton's biplane method of disks. There is normal LV mass and concentric remodeling.     Right Ventricle:  The right ventricular cavity is normal in size and normal systolic function. Tricuspid annular plane systolic excursion (TAPSE) is 2.4 cm (normal >=1.7 cm).     Left Atrium:  The left atrium is normal with an indexed volume of 21.08 ml/m².     Right Atrium:  The right atrium is normal in size.     Aortic Valve:  The aortic valve appears trileaflet with normal systolic excursion. There is focal calcification of the aortic valve leaflets. There is mild aortic regurgitation.     Mitral Valve:  Mitral valve leaflets have focal calcification. There is mild mitral regurgitation.     Tricuspid Valve:  Structurally normal tricuspid valve with normal leaflet excursion. There is mild to moderate tricuspid regurgitation. Estimated pulmonary artery systolic pressure is 35 mmHg, consistent with normal pulmonary artery pressure.     Pulmonic Valve:  The pulmonic valve was not well visualized. There is mild to moderate pulmonic regurgitation.     Aorta:  The aortic root at the sinuses of Valsalva is normal in size, measuring 2.50 cm (indexed 1.75 cm/m²). The ascending aortadiameter is normal in size, measuring 2.30 cm (indexed 1.61 cm/m²).     Extra-cardiac Findings:  Echo-free space is noted in the liver consistent with cyst, however, dedicated imaging recommended for further evaluation if clinically indicated.     Systemic Veins:  The inferior vena cava is normal in size measuring 0.99 cm in diameter, (normal <2.1cm) with normal inspiratory collapse (normal >50%) consistent with normal right atrial pressure (~3, range 0-5mmHg).  ____________________________________________________________________  QUANTITATIVE DATA:  Left Ventricle Measurements: (Indexed to BSA)     IVSd (2D):   1.0 cm  LVPWd (2D):  0.9 cm  LVIDd (2D):  3.5 cm  LVIDs (2D):  2.1 cm  LV Mass:     93 g   64.6 g/m²  LV Vol d, MOD A2C: 31.9 ml 22.27 ml/m²  LV Vol d, MOD A4C: 43.4 ml 30.30 ml/m²  LV Vol d, MOD BP:  37.7 ml 26.29 ml/m²  LV Vol s, MOD A2C: 10.1 ml 7.05 ml/m²  LV Vol s, MOD A4C: 14.6 ml 10.19 ml/m²  LV Vol s, MOD BP:  11.9 ml 8.29 ml/m²  LVOT SV MOD BP:    25.8 ml  LV EF% MOD BP:   68 %     MV E Vmax:    1.00 m/s  MV A Vmax:    1.28 m/s  MV E/A:       0.78  e' lateral:   22.40 cm/s  e' medial:    14.60 cm/s  E/e' lateral: 4.45  E/e' medial:  6.82  E/e' Average: 5.38  MV DT:        278 msec    Aorta Measurements: (normal range) (Indexed to BSA)     Sinuses of Valsalva: 2.50 cm (2.7 - 3.3 cm)  Ao Asc prox:         2.30 cm       Left Atrium Measurements: (Indexed to BSA)  LA Diam 2D: 3.30 cm    Right Ventricle Measurements:     TAPSE:            2.4 cm  RV Base (RVID1):  2.7 cm  RV Mid (RVID2):   2.7 cm  RV Major (RVID3): 6.2 cm       LVOT / RVOT/ Qp/Qs Data: (Indexed to BSA)  LVOT Diameter: 1.50 cm    Mitral Valve Measurements:     MV E Vmax: 1.0 m/s  MV A Vmax: 1.3 m/s  MV E/A:    0.8    Tricuspid Valve Measurements:     TR Vmax:          2.8 m/s  TR Peak Gradient: 31.8 mmHg  RA Pressure:      3 mmHg  PASP:             35 mmHg  _____________________________________________________________________________________  Electronically signed on 1/11/2024 at 5:21:08 PM by Ap Arellano     *** Final ***

## 2024-03-17 NOTE — PHYSICAL THERAPY INITIAL EVALUATION ADULT - IMPAIRMENTS FOUND, PT EVAL
Thank you!    Thank you for allowing me to care for you in the emergency department.  I sincerely hope that you are satisfied with your visit today.  It is my goal to provide you with excellent care.    Below you will find a list of your labs and imaging from your visit today if applicable. Should you have any questions regarding these results please do not hesitate to call the emergency department. Please review [employer] for a more detailed result list since the below list may not be comprehensive. Instructions on how to sign up to [employer] should be provided in this packet.    Labs -  Recent Results (from the past 12 hour(s))   COVID-19 & Influenza Combo    Collection Time: 03/16/24 11:06 PM    Specimen: Nasopharyngeal   Result Value Ref Range    SARS-CoV-2, PCR Not Detected Not Detected      Rapid Influenza A By PCR Not Detected Not Detected      Rapid Influenza B By PCR Not Detected Not Detected     CBC with Auto Differential    Collection Time: 03/16/24 11:22 PM   Result Value Ref Range    WBC 3.3 (L) 3.6 - 11.0 K/uL    RBC 4.22 3.80 - 5.20 M/uL    Hemoglobin 12.0 11.5 - 16.0 g/dL    Hematocrit 36.7 35.0 - 47.0 %    MCV 87.0 80.0 - 99.0 FL    MCH 28.4 26.0 - 34.0 PG    MCHC 32.7 30.0 - 36.5 g/dL    RDW 12.9 11.5 - 14.5 %    Platelets 233 150 - 400 K/uL    MPV 9.6 8.9 - 12.9 FL    Nucleated RBCs 0.0 0.0  WBC    nRBC 0.00 0.00 - 0.01 K/uL    Neutrophils % 34 32 - 75 %    Lymphocytes % 50 (H) 12 - 49 %    Monocytes % 9 5 - 13 %    Eosinophils % 7 0 - 7 %    Basophils % 0 0 - 1 %    Immature Granulocytes 0 0 - 0.5 %    Neutrophils Absolute 1.1 (L) 1.8 - 8.0 K/UL    Lymphocytes Absolute 1.7 0.8 - 3.5 K/UL    Monocytes Absolute 0.3 0.0 - 1.0 K/UL    Eosinophils Absolute 0.2 0.0 - 0.4 K/UL    Basophils Absolute 0.0 0.0 - 0.1 K/UL    Absolute Immature Granulocyte 0.0 0.00 - 0.04 K/UL    Differential Type AUTOMATED     Comprehensive Metabolic Panel    Collection Time: 03/16/24 11:22 PM   Result Value Ref Range    
aerobic capacity/endurance/gait, locomotion, and balance

## 2024-03-27 NOTE — CARE COORDINATION ASSESSMENT. - NSCAREPROVIDERS_GEN_ALL_CORE_FT
CARE PROVIDERS:  Accepting Physician: Arley Don  Administration: Jacki Dennison  Administration: Mahendra Greene  Administration: Doreen Mares  Administration: Neptali Vega  Admitting: Doctor, Unknown  Attending: , Maria L  Consultant: Ap Arellano  Consultant: Laura Bang  Covering Team: Shahriar Serna  ED Attending: Darnell Lopez  ED Nurse: Doe Ingram  Emergency Medicine: Darnell Lopez  Nurse: Dalila Hernández  Nurse: Maryan Oropeza  Ordered: ADM, User  Outpatient Provider: Eliseo Jarquin  Outpatient Provider: Anthony Mondragon  Override: Joleen Edwards  Override: Bridget Craft  PCA/Nursing Assistant: Marielos Cook  PCA/Nursing Assistant: Sunny Cox  Physical Therapy: Maryan Bonilla  Primary Team: Charles Landaverde// Supp. Assoc.: Joleen Riggs   Blunted

## 2024-04-02 NOTE — ED ADULT NURSE NOTE - MUSCULOSKELETAL WDL
(1) Outpatient Area
Full range of motion of upper and lower extremities, no joint tenderness/swelling.

## 2024-11-18 NOTE — ED ADULT NURSE NOTE - CHPI ED NUR SYMPTOMS POS
[FreeTextEntry1] : Here for CPE. Pt had LUQ abdominal discomfort for 2 days. She took a laxative and the discomfort resolved. Feels fine today. Sometimes experiences tightness in her abdomen.
[de-identified] : Never a smoker. Rare alcohol. Last mammogram and GYN appointment over 1 year ago. Has an appointment to see GYN on 9/3/24. Hasn't had a colonoscopy. Exercises 3 -4 times per week Pt is c/o left breast soreness for about 5 days. There seems to be some swelling.
18-Nov-2024 22:30
BURNING URINATION/PAINFUL URINATION

## 2024-12-05 NOTE — H&P ADULT - DOES THIS PATIENT HAVE A HISTORY OF OR HAS BEEN DX WITH HEART FAILURE?
Show Applicator Variable?: Yes Duration Of Freeze Thaw-Cycle (Seconds): 5 Post-Care Instructions: I reviewed with the patient in detail post-care instructions. Patient is to wear sunprotection, and avoid picking at any of the treated lesions. Pt may apply Vaseline to crusted or scabbing areas. Render Note In Bullet Format When Appropriate: No Detail Level: Simple Number Of Freeze-Thaw Cycles: 1 freeze-thaw cycle Consent: The patient's consent was obtained including but not limited to risks of crusting, scabbing, blistering, scarring, darker or lighter pigmentary change, recurrence, incomplete removal and infection. no

## 2025-03-28 ENCOUNTER — INPATIENT (INPATIENT)
Facility: HOSPITAL | Age: 89
LOS: 3 days | Discharge: HOME | DRG: 310 | End: 2025-04-01
Attending: FAMILY MEDICINE | Admitting: INTERNAL MEDICINE
Payer: MEDICARE

## 2025-03-28 VITALS
DIASTOLIC BLOOD PRESSURE: 128 MMHG | TEMPERATURE: 97 F | SYSTOLIC BLOOD PRESSURE: 186 MMHG | WEIGHT: 113.1 LBS | RESPIRATION RATE: 20 BRPM | HEIGHT: 58 IN | OXYGEN SATURATION: 97 % | HEART RATE: 120 BPM

## 2025-03-28 DIAGNOSIS — I47.1 SUPRAVENTRICULAR TACHYCARDIA: ICD-10-CM

## 2025-03-28 DIAGNOSIS — K59.00 CONSTIPATION, UNSPECIFIED: ICD-10-CM

## 2025-03-28 DIAGNOSIS — E03.9 HYPOTHYROIDISM, UNSPECIFIED: ICD-10-CM

## 2025-03-28 DIAGNOSIS — E78.5 HYPERLIPIDEMIA, UNSPECIFIED: ICD-10-CM

## 2025-03-28 DIAGNOSIS — R00.2 PALPITATIONS: ICD-10-CM

## 2025-03-28 DIAGNOSIS — K76.89 OTHER SPECIFIED DISEASES OF LIVER: Chronic | ICD-10-CM

## 2025-03-28 DIAGNOSIS — Z29.9 ENCOUNTER FOR PROPHYLACTIC MEASURES, UNSPECIFIED: ICD-10-CM

## 2025-03-28 DIAGNOSIS — I10 ESSENTIAL (PRIMARY) HYPERTENSION: ICD-10-CM

## 2025-03-28 LAB
ALBUMIN SERPL ELPH-MCNC: 3.7 G/DL — SIGNIFICANT CHANGE UP (ref 3.3–5)
ALP SERPL-CCNC: 91 U/L — SIGNIFICANT CHANGE UP (ref 40–120)
ALT FLD-CCNC: 27 U/L — SIGNIFICANT CHANGE UP (ref 12–78)
ANION GAP SERPL CALC-SCNC: 6 MMOL/L — SIGNIFICANT CHANGE UP (ref 5–17)
APPEARANCE UR: CLEAR — SIGNIFICANT CHANGE UP
APTT BLD: 32.3 SEC — SIGNIFICANT CHANGE UP (ref 24.5–35.6)
AST SERPL-CCNC: 25 U/L — SIGNIFICANT CHANGE UP (ref 15–37)
BASOPHILS # BLD AUTO: 0.03 K/UL — SIGNIFICANT CHANGE UP (ref 0–0.2)
BASOPHILS NFR BLD AUTO: 0.4 % — SIGNIFICANT CHANGE UP (ref 0–2)
BILIRUB SERPL-MCNC: 0.7 MG/DL — SIGNIFICANT CHANGE UP (ref 0.2–1.2)
BILIRUB UR-MCNC: NEGATIVE — SIGNIFICANT CHANGE UP
BUN SERPL-MCNC: 22 MG/DL — SIGNIFICANT CHANGE UP (ref 7–23)
CALCIUM SERPL-MCNC: 9.1 MG/DL — SIGNIFICANT CHANGE UP (ref 8.5–10.1)
CHLORIDE SERPL-SCNC: 104 MMOL/L — SIGNIFICANT CHANGE UP (ref 96–108)
CO2 SERPL-SCNC: 27 MMOL/L — SIGNIFICANT CHANGE UP (ref 22–31)
COLOR SPEC: YELLOW — SIGNIFICANT CHANGE UP
CREAT SERPL-MCNC: 0.89 MG/DL — SIGNIFICANT CHANGE UP (ref 0.5–1.3)
D DIMER BLD IA.RAPID-MCNC: 299 NG/ML DDU — HIGH
DIFF PNL FLD: ABNORMAL
EGFR: 60 ML/MIN/1.73M2 — SIGNIFICANT CHANGE UP
EGFR: 60 ML/MIN/1.73M2 — SIGNIFICANT CHANGE UP
EOSINOPHIL # BLD AUTO: 0.05 K/UL — SIGNIFICANT CHANGE UP (ref 0–0.5)
EOSINOPHIL NFR BLD AUTO: 0.7 % — SIGNIFICANT CHANGE UP (ref 0–6)
GLUCOSE SERPL-MCNC: 124 MG/DL — HIGH (ref 70–99)
GLUCOSE UR QL: NEGATIVE MG/DL — SIGNIFICANT CHANGE UP
HCT VFR BLD CALC: 41 % — SIGNIFICANT CHANGE UP (ref 34.5–45)
HGB BLD-MCNC: 13.6 G/DL — SIGNIFICANT CHANGE UP (ref 11.5–15.5)
IMM GRANULOCYTES NFR BLD AUTO: 0.6 % — SIGNIFICANT CHANGE UP (ref 0–0.9)
INR BLD: 0.95 RATIO — SIGNIFICANT CHANGE UP (ref 0.85–1.16)
KETONES UR-MCNC: NEGATIVE MG/DL — SIGNIFICANT CHANGE UP
LEUKOCYTE ESTERASE UR-ACNC: NEGATIVE — SIGNIFICANT CHANGE UP
LYMPHOCYTES # BLD AUTO: 0.78 K/UL — LOW (ref 1–3.3)
LYMPHOCYTES # BLD AUTO: 10.7 % — LOW (ref 13–44)
MAGNESIUM SERPL-MCNC: 2.3 MG/DL — SIGNIFICANT CHANGE UP (ref 1.6–2.6)
MCHC RBC-ENTMCNC: 29.4 PG — SIGNIFICANT CHANGE UP (ref 27–34)
MCHC RBC-ENTMCNC: 33.2 G/DL — SIGNIFICANT CHANGE UP (ref 32–36)
MCV RBC AUTO: 88.6 FL — SIGNIFICANT CHANGE UP (ref 80–100)
MONOCYTES # BLD AUTO: 0.98 K/UL — HIGH (ref 0–0.9)
MONOCYTES NFR BLD AUTO: 13.5 % — SIGNIFICANT CHANGE UP (ref 2–14)
NEUTROPHILS # BLD AUTO: 5.39 K/UL — SIGNIFICANT CHANGE UP (ref 1.8–7.4)
NEUTROPHILS NFR BLD AUTO: 74.1 % — SIGNIFICANT CHANGE UP (ref 43–77)
NITRITE UR-MCNC: NEGATIVE — SIGNIFICANT CHANGE UP
NRBC BLD AUTO-RTO: 0 /100 WBCS — SIGNIFICANT CHANGE UP (ref 0–0)
NT-PROBNP SERPL-SCNC: 1536 PG/ML — HIGH (ref 0–450)
PH UR: 7 — SIGNIFICANT CHANGE UP (ref 5–8)
PLATELET # BLD AUTO: 217 K/UL — SIGNIFICANT CHANGE UP (ref 150–400)
POTASSIUM SERPL-MCNC: 4.2 MMOL/L — SIGNIFICANT CHANGE UP (ref 3.5–5.3)
POTASSIUM SERPL-SCNC: 4.2 MMOL/L — SIGNIFICANT CHANGE UP (ref 3.5–5.3)
PROT SERPL-MCNC: 7.4 G/DL — SIGNIFICANT CHANGE UP (ref 6–8.3)
PROT UR-MCNC: SIGNIFICANT CHANGE UP MG/DL
PROTHROM AB SERPL-ACNC: 11.2 SEC — SIGNIFICANT CHANGE UP (ref 9.9–13.4)
RBC # BLD: 4.63 M/UL — SIGNIFICANT CHANGE UP (ref 3.8–5.2)
RBC # FLD: 14.2 % — SIGNIFICANT CHANGE UP (ref 10.3–14.5)
SODIUM SERPL-SCNC: 137 MMOL/L — SIGNIFICANT CHANGE UP (ref 135–145)
SP GR SPEC: 1.01 — SIGNIFICANT CHANGE UP (ref 1–1.03)
TROPONIN I, HIGH SENSITIVITY RESULT: 12.3 NG/L — SIGNIFICANT CHANGE UP
TROPONIN I, HIGH SENSITIVITY RESULT: 8.9 NG/L — SIGNIFICANT CHANGE UP
UROBILINOGEN FLD QL: 0.2 MG/DL — SIGNIFICANT CHANGE UP (ref 0.2–1)
WBC # BLD: 7.27 K/UL — SIGNIFICANT CHANGE UP (ref 3.8–10.5)
WBC # FLD AUTO: 7.27 K/UL — SIGNIFICANT CHANGE UP (ref 3.8–10.5)

## 2025-03-28 PROCEDURE — 93010 ELECTROCARDIOGRAM REPORT: CPT | Mod: 76

## 2025-03-28 PROCEDURE — 99223 1ST HOSP IP/OBS HIGH 75: CPT

## 2025-03-28 PROCEDURE — 71045 X-RAY EXAM CHEST 1 VIEW: CPT | Mod: 26

## 2025-03-28 PROCEDURE — 99222 1ST HOSP IP/OBS MODERATE 55: CPT | Mod: GC

## 2025-03-28 PROCEDURE — 99285 EMERGENCY DEPT VISIT HI MDM: CPT

## 2025-03-28 RX ORDER — ACETAMINOPHEN 500 MG/5ML
650 LIQUID (ML) ORAL EVERY 6 HOURS
Refills: 0 | Status: DISCONTINUED | OUTPATIENT
Start: 2025-03-28 | End: 2025-04-01

## 2025-03-28 RX ORDER — LISINOPRIL 5 MG/1
20 TABLET ORAL
Refills: 0 | Status: DISCONTINUED | OUTPATIENT
Start: 2025-03-28 | End: 2025-04-01

## 2025-03-28 RX ORDER — SENNA 187 MG
2 TABLET ORAL AT BEDTIME
Refills: 0 | Status: DISCONTINUED | OUTPATIENT
Start: 2025-03-28 | End: 2025-04-01

## 2025-03-28 RX ORDER — METOPROLOL SUCCINATE 50 MG/1
5 TABLET, EXTENDED RELEASE ORAL ONCE
Refills: 0 | Status: COMPLETED | OUTPATIENT
Start: 2025-03-28 | End: 2025-03-28

## 2025-03-28 RX ORDER — SOTALOL HYDROCHLORIDE 120 MG/1
40 TABLET ORAL ONCE
Refills: 0 | Status: COMPLETED | OUTPATIENT
Start: 2025-03-28 | End: 2025-03-28

## 2025-03-28 RX ORDER — ENOXAPARIN SODIUM 100 MG/ML
40 INJECTION SUBCUTANEOUS EVERY 24 HOURS
Refills: 0 | Status: DISCONTINUED | OUTPATIENT
Start: 2025-03-28 | End: 2025-03-31

## 2025-03-28 RX ORDER — ACETAMINOPHEN 500 MG/5ML
1000 LIQUID (ML) ORAL ONCE
Refills: 0 | Status: COMPLETED | OUTPATIENT
Start: 2025-03-28 | End: 2025-03-28

## 2025-03-28 RX ORDER — POLYETHYLENE GLYCOL 3350 17 G/17G
17 POWDER, FOR SOLUTION ORAL DAILY
Refills: 0 | Status: DISCONTINUED | OUTPATIENT
Start: 2025-03-28 | End: 2025-04-01

## 2025-03-28 RX ADMIN — SOTALOL HYDROCHLORIDE 40 MILLIGRAM(S): 120 TABLET ORAL at 15:48

## 2025-03-28 RX ADMIN — Medication 2 TABLET(S): at 22:03

## 2025-03-28 RX ADMIN — Medication 500 MILLILITER(S): at 05:41

## 2025-03-28 RX ADMIN — ENOXAPARIN SODIUM 40 MILLIGRAM(S): 100 INJECTION SUBCUTANEOUS at 17:36

## 2025-03-28 RX ADMIN — LISINOPRIL 20 MILLIGRAM(S): 5 TABLET ORAL at 17:36

## 2025-03-28 RX ADMIN — Medication 10 MILLIGRAM(S): at 05:42

## 2025-03-28 RX ADMIN — METOPROLOL SUCCINATE 5 MILLIGRAM(S): 50 TABLET, EXTENDED RELEASE ORAL at 11:31

## 2025-03-28 NOTE — ED PROVIDER NOTE - CLINICAL SUMMARY MEDICAL DECISION MAKING FREE TEXT BOX
89F PMH Atrial Tachycardia, Htn, Hld, Hypothyrodism, anxiety p/w palpitations and urianry frequency 89F PMH Atrial Tachycardia, Htn, Hld, Hypothyrodism, anxiety p/w palpitations and urianry frequency. palpitations started about 5 hrs ago. pt also states she is having urinary frequency. No fevers, recent immobilization. Pt is on sotalol. The only change to medicine is she is trying to wean herself off hydralazine 10mg BID. She is now taking it once daily 89F PMH Atrial Tachycardia, Htn, Hld, Hypothyrodism, anxiety p/w palpitations and urinary frequency. palpitations started about 5 hrs ago. pt also states she is having urinary frequency. No fevers, recent immobilization. Pt is on sotalol. The only change to medicine is she is trying to wean herself off hydralazine 10mg BID. She is now taking it once daily 89F PMH Atrial Tachycardia, Htn, Hld, Hypothyrodism, anxiety p/w palpitations and urinary frequency. palpitations started about 5 hrs ago. pt also states she is having urinary frequency. No fevers, recent immobilization. Pt is on sotalol. The only change to medicine is she is trying to wean herself off hydralazine 10mg BID. She is now taking it once daily    Gen: NAD, non-toxic appearing, awake alert   HEENT: normal conjunctiva, oral mucosa moist  Lung: CTAB, no respiratory distress, no wheezes/rhonchi/rales B/L, speaking in full sentences  CV: Regular, tachy  Abd: soft, NT, ND, no guarding, no rigidity, no rebound tenderness  MSK: no visible deformities  Skin: Warm, well perfused, no leg swelling    DDx includes but not limited to: will eval for ACS, CHF, PE, lyte derangements, jai  Plan: cardiac labs, cxr, reassess symptoms    See Progress Notes for further updates on ED Course

## 2025-03-28 NOTE — H&P ADULT - NSHPREVIEWOFSYSTEMS_GEN_ALL_CORE
CONSTITUTIONAL: denies fever, chills, fatigue, weakness  HEENT: denies blurred vision, sore throat  SKIN: denies new lesions, rash  CARDIOVASCULAR: denies chest pain, chest pressure, palpitations  RESPIRATORY: denies shortness of breath, sputum production  GASTROINTESTINAL: denies nausea, vomiting, diarrhea, abdominal pain  GENITOURINARY: denies dysuria, discharge  NEUROLOGICAL: denies numbness, headache, focal weakness  MUSCULOSKELETAL: denies new joint pain, muscle aches  HEMATOLOGIC: denies gross bleeding, bruising  LYMPHATICS: denies enlarged lymph nodes, extremity swelling  PSYCHIATRIC: denies recent changes in anxiety, depression  ENDOCRINOLOGIC: denies sweating, cold or heat intolerance CONSTITUTIONAL: denies fever, chills, fatigue, weakness  HEENT: denies blurred vision, sore throat  SKIN: denies new lesions, rash  CARDIOVASCULAR: + palpitations; denies chest pain, chest pressure  RESPIRATORY: denies shortness of breath, sputum production  GASTROINTESTINAL: denies nausea, vomiting, diarrhea, abdominal pain  GENITOURINARY: + urinary frequency; denies dysuria, discharge  NEUROLOGICAL: denies numbness, headache, focal weakness  MUSCULOSKELETAL: denies new joint pain, muscle aches  HEMATOLOGIC: denies gross bleeding, bruising  LYMPHATICS: denies enlarged lymph nodes, extremity swelling  PSYCHIATRIC: + anxiety; denies recent changes in depression  ENDOCRINOLOGIC: denies sweating, cold or heat intolerance CONSTITUTIONAL: denies fever, chills, fatigue, weakness  HEENT: denies blurred vision, sore throat  SKIN: denies new lesions, rash  CARDIOVASCULAR: + palpitations; denies chest pain, chest pressure  RESPIRATORY: denies shortness of breath, sputum production  GASTROINTESTINAL: + constipation; denies nausea, vomiting, diarrhea, abdominal pain  GENITOURINARY: + urinary frequency; denies dysuria, discharge  NEUROLOGICAL: denies numbness, headache, focal weakness  MUSCULOSKELETAL: denies new joint pain, muscle aches  HEMATOLOGIC: denies gross bleeding, bruising  LYMPHATICS: denies enlarged lymph nodes, extremity swelling  PSYCHIATRIC: + anxiety; denies recent changes in depression  ENDOCRINOLOGIC: denies sweating, cold or heat intolerance

## 2025-03-28 NOTE — H&P ADULT - PROBLEM SELECTOR PLAN 6
DVT ppx: Lovenox 40mg q24hrs     #Diet: Regular consistency, DASH/TLC    #GOC: DNR/DNI w/ NIV. MOLST form filled out and placed in chart. DVT ppx: Lovenox 40mg q24hrs     #Diet: Regular consistency, DASH/TLC    #GOC: DNR/DNI w/ trila NIV. MOLST form filled out and placed in chart.

## 2025-03-28 NOTE — CARE COORDINATION ASSESSMENT. - FACILITY OF RESIDENCE YN
[facility] reviewed. UDS: + for Cotinine, Cymbalta, Oxycodone. Last seen: 8/30/2021.  Follow-up:   Future Appointments   Date Time Provider Kenny Arrington   11/1/2021  8:30 AM MEGHAN Rosenberg - CNP N [facility] [place]   11/4/2021 11:45 AM Sushma Gee MD [address] [place] [address] No

## 2025-03-28 NOTE — H&P ADULT - CONVERSATION DETAILS
Met with patient at bedside. Reviewed patient's medical and social history as well as events leading to patient's hospitalization. Discussed patient's current diagnosis,  medical condition, management, and  prognosis. Inquired about patient's wishes and thoughts regarding cardiopulmonary resuscitation. Patient showed good insight into medical condition. All questions answered. Writer recommended MOLST. Patient consented to DNR/DNI with NIV status. MOLST form filled out and placed in chart. Psychosocial support provided. Met with patient at bedside. Reviewed patient's medical and social history as well as events leading to patient's hospitalization. Discussed patient's current diagnosis,  medical condition, management, and  prognosis. Inquired about patient's wishes and thoughts regarding cardiopulmonary resuscitation. Patient showed good insight into medical condition. All questions answered. Writer recommended MOLST. Patient consented to DNR/DNI with trial NIV status. MOLST form filled out and placed in chart. Psychosocial support provided.

## 2025-03-28 NOTE — ED PROVIDER NOTE - NSFOLLOWUPINSTRUCTIONS_ED_ALL_ED_FT
You were seen today in the emergency room for palpitations    You need to follow up with your primary care doctor and cardiologist in the next 48-72 hours    If you develop any new or worsening symptoms you need to return immediately to the emergency department. If you experience any of the following please come right back to the emergency room: chest pain that becomes much worse with walking up stairs or exercising, uncontrollable nausea and vomiting, severe chest pain that will not go away, passing out, new persistent numbness and/or weakness

## 2025-03-28 NOTE — H&P ADULT - NSHPSOCIALHISTORY_GEN_ALL_CORE
Tobacco:   EtOH:   Recreational drug use:  Lives with:  Ambulates:  ADLs: Tobacco: former smoker, quit 70+ years ago, smoker for <5 yrs 0.5ppd   EtOH: denies  Recreational drug use: denies  Lives with: daughter at home   Ambulates: with a cane/walker   ADLs: independent

## 2025-03-28 NOTE — H&P ADULT - NSHPOUTPATIENTPROVIDERS_GEN_ALL_CORE
PCP: Dr. Rebecca Donnelly   Cardio: Dr. Lugo [facility]  Uro: Dr. Jarquin

## 2025-03-28 NOTE — H&P ADULT - HISTORY OF PRESENT ILLNESS
yo F/M with PMH presents to the ED with     Denies fever, chills, chest pain, palpitations, SOB, cough, abdominal pain, nausea, vomiting, diarrhea, constipation, urinary frequency, urgency, or dysuria, headaches, changes in vision, dizziness, numbness, tingling.  Denies recent travel, recent antibiotic use, or sick contacts.    ED Course:   Vitals: BP: 180/128 HR: 120, Temp: 97.2, RR: 20, SpO2: 97% on RA  Labs:    WBCs 7.27, H/H 13.6/41.0, D-dimer 299, BUN/Cr 22/0.89, Na 137, K 4.1, Mg 2.3, Trop 8.9 --> 12.3, ProBNP 1536  UA: - LE, - nitrite, occasional bacteria   EKG: Premature Atrial Tachycardia   Received in the ED: S/p hydralazine 10mg PO x1, Lopressor 5mg IV x1, 500cc IV NS bolus x1  89yo F with PMHx of atrial tachycardia, HTN, HLD, and hypothyroidism (not on meds anymore) presents to the ED with urinary frequency x1 day, associated with palpitations. Patient reports she started experiencing increased urinary frequency last night around midnight without associated dysuria or hematuria; patient states she felt she started getting anxious due to the increased urinary frequency and started feeling palpitations shortly after the urinary symptoms started; denies any associated chest pain/pressure, SOB, orthopnea, leg swelling. Patient endroses Hx of palpitations associated with anxiety in the past, but denies any Hx of Afib/atrial flutter .Per chart review, patient was hospitalized at [facility]  01/06/2024-01/13/2024 for palpitations; her home meds adjusted at that time. At the time, Cardizem, Toprol, and Clonidine were d/c'ed. Patient was started on Sotalol and Lisinopril. Outpatient, patient was started on hydralazine 10mg BID by Cardio Dr. Lugo; patient states she decreased her own dose to 10mg daily as she felt tired taking hydralzine BID; patient states Dr. Lugo aware of this. Patient states she had 3-4 UTIs last year, but reports no UTIs in 2025. Denies any recent ABx use.     Denies fever, chills, cough, abdominal pain, nausea, vomiting, diarrhea, constipation, headaches, changes in vision, dizziness, numbness, tingling.    Denies recent travel or sick contacts.    ED Course:   Vitals: BP: 180/128 HR: 120, Temp: 97.2, RR: 20, SpO2: 97% on RA  Labs:    WBCs 7.27, H/H 13.6/41.0, D-dimer 299, BUN/Cr 22/0.89, Na 137, K 4.1, Mg 2.3, Trop 8.9 --> 12.3, ProBNP 1536  UA: - LE, - nitrite, occasional bacteria   EKG: Premature Atrial Tachycardia   Received in the ED: S/p hydralazine 10mg PO x1, Lopressor 5mg IV x1, 500cc IV NS bolus x1

## 2025-03-28 NOTE — H&P ADULT - ASSESSMENT
89yo F with PMHx of atrial tachycardia, HTN, HLD, and hypothyroidism (not on meds anymore) presents to the ED with urinary frequency x1 day, associated with palpitations. Patient reports she started experiencing increased urinary frequency last night around midnight without associated dysuria or hematuria; patient states she felt she started getting anxious due to the increased urinary frequency and started feeling palpitations. Admitted for atrial tachycardia.

## 2025-03-28 NOTE — CONSULT NOTE ADULT - ASSESSMENT
- Patient is not complaining of any cardiac symptoms at this time.  - No clear evidence of acute ischemia, trops negative x 2. Will follow up third set.  - His CKs are flat, suggesting against acute atherosclerotic plaque rupture.  - Biomarker trend is not consistent with plaque rupture but rather demand ischemia. Monitor closely for the development of anginal symptoms or clinical signs of ischemia.   - No acute changes on EKG compared to previous.  - No meaningful evidence of volume overload.  - Previous TTE shows ___.  - BP well controlled, monitor routine hemodynamics.  - Continue ___.  - Monitor and replete lytes, keep K>4, Mg>2.  - Strict I/Os, daily weights.  - Pt has no active ischemia, decompensated heart failure, unstable arrythmia, or severe stenotic valvular disease, and has __ cardiac risk factors. In the setting of low risk ___, he/she is optimized from cardiovascular standpoint to proceed with planned procedure with routine hemodynamic monitoring.   - Pt has no modifiable active cardiac risk factors and in the setting of low risk _____, patient is optimized as best as possible from cardiovascular standpoint to proceed with planned procedure with routine hemodynamic monitoring.  - Other cardiovascular workup will depend on clinical course.  - All other workup per primary team.  - Will continue to follow.             Patient is a 89 yo Female with PMHx of atrial tachycardia, HTN, HLD, Hypothyroidism who presents with a chief complaint of palpitations.     - Patient is not complaining of any cardiac symptoms at this time.  - No clear evidence of acute ischemia, trops negative x 2. Will follow up third set.  - Biomarker trend is not consistent with plaque rupture but rather demand ischemia. Monitor closely for the development of anginal symptoms or clinical signs of ischemia.   - No acute changes on EKG compared to previous.  - No meaningful evidence of volume overload.  - Previous TTE (01/2024) shows:   1. Technically difficult image quality.   2. Left ventricular systolic function is normal with an ejection fraction of 68 % by Hinton's method of disks.   3. There is normal LV mass and concentric remodeling.   4. Normal right ventricular cavity size and normal systolic function.   5. The left atrium is normal.   6. Mitral valve leaflets have focal calcification.   7. Mild mitral regurgitation.   8. Trileaflet aortic valve with normal systolic excursion. There is focal calcification of the aortic valve leaflets.   9. Mild aortic regurgitation.  10. Estimated pulmonary artery systolic pressure is 35 mmHg, consistent with normal pulmonary artery pressure.  11. Echo-free space is noted in the liver consistent with cyst, however, dedicated imaging recommended for further evaluation if clinically indicated.    - BP well controlled, monitor routine hemodynamics.  - Continue ___.  - Monitor and replete lytes, keep K>4, Mg>2.  - Strict I/Os, daily weights.  - Pt has no active ischemia, decompensated heart failure, unstable arrythmia, or severe stenotic valvular disease, and has __ cardiac risk factors. In the setting of low risk ___, he/she is optimized from cardiovascular standpoint to proceed with planned procedure with routine hemodynamic monitoring.   - Pt has no modifiable active cardiac risk factors and in the setting of low risk _____, patient is optimized as best as possible from cardiovascular standpoint to proceed with planned procedure with routine hemodynamic monitoring.  - Other cardiovascular workup will depend on clinical course.  - All other workup per primary team.  - Will continue to follow.         Patient is a 89 yo Female with PMHx of atrial tachycardia, HTN, HLD, Hypothyroidism who presents with a chief complaint of urinary frequency and palpitations.     - EKG likely shows Premature Atrial Tachycardia, administer IV metoprolol 5mg push x1, recommend hospital admission for further evaluation.  - No clear evidence of acute ischemia, trops negative, f/u with additional results.   - No meaningful evidence of volume overload.    - Previous TTE (01/2024) shows:   1. Technically difficult image quality.   2. Left ventricular systolic function is normal with an ejection fraction of 68 % by Hinton's method of disks.   3. There is normal LV mass and concentric remodeling.   4. Normal right ventricular cavity size and normal systolic function.   5. The left atrium is normal.   6. Mitral valve leaflets have focal calcification.   7. Mild mitral regurgitation.   8. Trileaflet aortic valve with normal systolic excursion. There is focal calcification of the aortic valve leaflets.   9. Mild aortic regurgitation.  10. Estimated pulmonary artery systolic pressure is 35 mmHg, consistent with normal pulmonary artery pressure.  11. Echo-free space is noted in the liver consistent with cyst, however, dedicated imaging recommended for further evaluation if clinically indicated.    - BP elevated, continue monitoring.  - Continue home meds.  - Monitor and replete lytes, keep K>4, Mg>2.  - Strict I/Os, daily weights.  - All other workup per primary team.  - Will continue to follow.         Patient is a 89 yo Female with PMHx of atrial tachycardia, HTN, HLD, Hypothyroidism who presents with a chief complaint of urinary frequency and palpitations.     #Atrial tachycardia/ HTN/ HLD   - EKG shows Premature Atrial Tachycardia, give IV metoprolol 5mg push x1,   - Continue home sotalol 40mg bid  - No clear evidence of acute ischemia, trops negativex2   - No meaningful evidence of volume overload.  - Previous TTE (01/2024) shows: Left ventricular systolic function is normal with an ejection fraction of 68 % by Hinton's method of disks. Mild mitral regurgitation.  Mild aortic regurgitation.  - BP elevated, continue monitoring routine hemodynamics.  - Given hydralazine 10mg ivp x1   - Continue home antihypertensives  - Monitor and replete lytes, keep K>4, Mg>2.  - Recommend hospital admission for further evaluation.  - All other workup per primary team.  - Will continue to follow.         Patient is a 89 yo Female with PMHx of atrial tachycardia, HTN, HLD, Hypothyroidism who presents with a chief complaint of urinary frequency and palpitations.     #Atrial tachycardia/ HTN/ HLD   - EKG shows Premature Atrial Tachycardia, give IV metoprolol 5mg push x1 and sotalol 40mg x1.   - Continue home sotalol 40mg bid  - No clear evidence of acute ischemia, trops negativex2   - No meaningful evidence of volume overload.  - Previous TTE (01/2024) shows: Left ventricular systolic function is normal with an ejection fraction of 68 % by Hinton's method of disks. Mild mitral regurgitation.  Mild aortic regurgitation.  - BP elevated, continue monitoring routine hemodynamics.  - Given hydralazine 10mg ivp x1   - Continue home antihypertensives  - Monitor and replete lytes, keep K>4, Mg>2.  - Recommend hospital admission for further evaluation.  - All other workup per primary team.  - Will continue to follow.         Patient is a 89 yo Female with PMHx of atrial tachycardia, HTN, HLD, Hypothyroidism who presents with a chief complaint of urinary frequency and palpitations.     #Atrial tachycardia/ HTN/ HLD   - EKG shows Premature Atrial Tachycardia , give IV metoprolol 5mg push x1 and po sotalol 40mg x1.   - Continue home sotalol 40mg bid pending stable qtc  - Most recent qtc 501  - No clear evidence of acute ischemia, trops negativex2   - No meaningful evidence of volume overload.  - Previous TTE (01/2024) shows: Left ventricular systolic function is normal with an ejection fraction of 68 % by Hinton's method of disks. Mild mitral regurgitation.  Mild aortic regurgitation.  - BP elevated, continue monitoring routine hemodynamics.  - Given hydralazine 10mg ivp x1   - Continue home antihypertensives  - Monitor and replete lytes, keep K>4, Mg>2.  - Recommend hospital admission for further evaluation.  - All other workup per primary team.  - Will continue to follow.

## 2025-03-28 NOTE — ED ADULT TRIAGE NOTE - CHIEF COMPLAINT QUOTE
Presents to ED with complaints of palpitations and frequent urination, denies chest pain or SOB, noted with elevated Bp, Hx Htn

## 2025-03-28 NOTE — ED ADULT NURSE NOTE - NSFALLHARMRISKINTERV_ED_ALL_ED

## 2025-03-28 NOTE — CARE COORDINATION ASSESSMENT. - OTHER PERTINENT REFERRAL INFORMATION
Jennifer met with Pt and family at bedside. Pt is A & O x3 and able to make her needs known. Pt lives in a pvt home with dtr with 3 ANNA and 12 steps to 2nd fl. Pt was indep with ADLs and uses a cane/walker at baseline. Pt has no hx of HC or DME and family would prefer to her to return home as soon as she can.

## 2025-03-28 NOTE — ED PROVIDER NOTE - PROGRESS NOTE DETAILS
age adjusted dimer wnl. lower concern for PE.  will place pt in cue for cards in the AM labs/imaging nonactionable thus far. pt and daughter updated about results. pt in cue for cards AM and ordered for rpt trop/ecg dr gallagher cards aware Case discussed with Dr. Joe cardiology.  Patient is recommended to be admitted.  They will write additional orders for medication to rate control.  Patient has previously been admitted to the hospitalist service.  Case discussed with Dr. Sainz hospitalist on-call.  Accepts the patient to telemetry.

## 2025-03-28 NOTE — H&P ADULT - ATTENDING COMMENTS
89yo F with PMHx of atrial tachycardia, HTN, HLD, and hypothyroidism (not on meds anymore) presents to the ED with urinary frequency x1 day, associated with palpitations. Patient reports she started experiencing increased urinary frequency last night around midnight without associated dysuria or hematuria; patient states she felt she started getting anxious due to the increased urinary frequency and started feeling palpitations. Admitted for atrial tachycardia.    As per cards, cont pt on sotalol bid, f/u 2d echo, monitor on tele, cont to f/u cards recs.    Bimal Sainz, Attending Physician

## 2025-03-28 NOTE — CONSULT NOTE ADULT - SUBJECTIVE AND OBJECTIVE BOX
*********CHARTING IN PROGRESS***********      Patient is a 89y old  Female who presents with a chief complaint of     HPI:      PAST MEDICAL & SURGICAL HISTORY:  HTN - Hypertension      Hypercholesteremia      Atrial tachycardia      Hypothyroid      S/P Cataract Surgery  B/L      Liver cyst  removed                ECHO  FINDINGS:      MEDICATIONS  (STANDING):    MEDICATIONS  (PRN):      FAMILY HISTORY:  FH: HTN (hypertension) (Mother)      Denies Family history of CAD or early MI      Constitutional: denies fever, chills  HEENT: denies blurry vision, difficulty hearing  Respiratory: denies SOB, SIMETNAL, cough  Cardiovascular: denies CP, palpitations, orthopnea, PND, LE edema  Gastrointestinal: denies nausea, vomiting, abdominal pain  Genitourinary: denies urinary changes  Skin: Denies rashes, itching  Neurologic: denies headache, weakness, dizziness  Hematology/Oncology: denies bleeding, easy bruising  ROS negative except as noted above      SOCIAL HISTORY:    No tobacco, Alcohol or Drug use    Vital Signs Last 24 Hrs  T(C): 36.2 (28 Mar 2025 05:07), Max: 36.2 (28 Mar 2025 05:07)  T(F): 97.2 (28 Mar 2025 05:07), Max: 97.2 (28 Mar 2025 05:07)  HR: 104 (28 Mar 2025 06:35) (100 - 120)  BP: 157/96 (28 Mar 2025 06:35) (157/96 - 186/128)  BP(mean): --  RR: 20 (28 Mar 2025 06:35) (18 - 20)  SpO2: 98% (28 Mar 2025 06:35) (97% - 98%)    Parameters below as of 28 Mar 2025 06:35  Patient On (Oxygen Delivery Method): room air        Physical Exam:  General: Well developed, well nourished, NAD  HEENT: NCAT, PERRLA, EOMI bl, moist mucous membranes   Neck: Supple, nontender, no mass  Neurology: A&Ox3, nonfocal, sensation intact   Respiratory: CTA B/L, No W/R/R  CV: RRR, +S1/S2, no murmurs, rubs or gallops  Abdominal: Soft, NT, ND +BSx4, no palpable masses  Extremities: No C/C/E, + peripheral pulses  MSK: Normal ROM, no joint erythema or warmth, no joint swelling   Heme: No obvious ecchymosis or petechiae   Skin: warm, dry, normal color      ECG:    I&O's Detail      LABS:                        13.6   7.27  )-----------( 217      ( 28 Mar 2025 05:35 )             41.0         137  |  104  |  22  ----------------------------<  124[H]  4.2   |  27  |  0.89    Ca    9.1      28 Mar 2025 05:35  Mg     2.3         TPro  7.4  /  Alb  3.7  /  TBili  0.7  /  DBili  x   /  AST  25  /  ALT  27  /  AlkPhos  91          PT/INR - ( 28 Mar 2025 05:35 )   PT: 11.2 sec;   INR: 0.95 ratio         PTT - ( 28 Mar 2025 05:35 )  PTT:32.3 sec  Urinalysis Basic - ( 28 Mar 2025 06:25 )    Color: Yellow / Appearance: Clear / S.010 / pH: x  Gluc: x / Ketone: Negative mg/dL  / Bili: Negative / Urobili: 0.2 mg/dL   Blood: x / Protein: Trace mg/dL / Nitrite: Negative   Leuk Esterase: Negative / RBC: 5-10 /HPF / WBC 0-2 /HPF   Sq Epi: x / Non Sq Epi: x / Bacteria: Occasional /HPF      I&O's Summary    BNP  RADIOLOGY & ADDITIONAL STUDIES: *********CHARTING IN PROGRESS***********      Patient is a 89 yo Female with PMHx of atrial tachycardia, HTN, HLD, Hypothyroidism who presents with a chief complaint of palpitations.     Interval HPI:   Patient was seen and examined at bedside.       PAST MEDICAL & SURGICAL HISTORY:  HTN - Hypertension      Hypercholesteremia      Atrial tachycardia      Hypothyroid      S/P Cataract Surgery  B/L      Liver cyst  removed          ECHO  FINDINGS (from 2024):   1. Technically difficult image quality.   2. Left ventricular systolic function is normal with an ejection fraction of 68 % by Hinton's method of disks.   3. There is normal LV mass and concentric remodeling.   4. Normal right ventricular cavity size and normal systolic function.   5. The left atrium is normal.   6. Mitral valve leaflets have focal calcification.   7. Mild mitral regurgitation.   8. Trileaflet aortic valve with normal systolic excursion. There is focal calcification of the aortic valve leaflets.   9. Mild aortic regurgitation.  10. Estimated pulmonary artery systolic pressure is 35 mmHg, consistent with normal pulmonary artery pressure.  11. Echo-free space is noted in the liver consistent with cyst, however, dedicated imaging recommended for further evaluation if clinically indicated.      MEDICATIONS  (STANDING):    MEDICATIONS  (PRN):      FAMILY HISTORY:  FH: HTN (hypertension) (Mother)      Denies Family history of CAD or early MI      Constitutional: denies fever, chills  HEENT: denies blurry vision, difficulty hearing  Respiratory: denies SOB, SIMENTAL, cough  Cardiovascular: denies CP, palpitations, orthopnea, PND, LE edema  Gastrointestinal: denies nausea, vomiting, abdominal pain  Genitourinary: denies urinary changes  Skin: Denies rashes, itching  Neurologic: denies headache, weakness, dizziness  Hematology/Oncology: denies bleeding, easy bruising  ROS negative except as noted above      SOCIAL HISTORY:    No tobacco, Alcohol or Drug use    Vital Signs Last 24 Hrs  T(C): 36.2 (28 Mar 2025 05:07), Max: 36.2 (28 Mar 2025 05:07)  T(F): 97.2 (28 Mar 2025 05:07), Max: 97.2 (28 Mar 2025 05:07)  HR: 104 (28 Mar 2025 06:35) (100 - 120)  BP: 157/96 (28 Mar 2025 06:35) (157/96 - 186/128)  BP(mean): --  RR: 20 (28 Mar 2025 06:35) (18 - 20)  SpO2: 98% (28 Mar 2025 06:35) (97% - 98%)    Parameters below as of 28 Mar 2025 06:35  Patient On (Oxygen Delivery Method): room air        Physical Exam:  General: Well developed, well nourished, NAD  HEENT: NCAT, PERRLA, EOMI bl, moist mucous membranes   Neck: Supple, nontender, no mass  Neurology: A&Ox3, nonfocal, sensation intact   Respiratory: CTA B/L, No W/R/R  CV: RRR, +S1/S2, no murmurs, rubs or gallops  Abdominal: Soft, NT, ND +BSx4, no palpable masses  Extremities: No C/C/E, + peripheral pulses  MSK: Normal ROM, no joint erythema or warmth, no joint swelling   Heme: No obvious ecchymosis or petechiae   Skin: warm, dry, normal color      ECG: Sinus tachycardia Left anterior fascicular block Minimal voltage criteria for LVH, may be normal variant ( [place] product ) Septal infarct , age undetermined Abnormal ECG When compared with ECG of 2024 23:25, Vent. rate has increased BY 66 BPM Septal infarct is now present T wave amplitude has increased in Lateral leads  Vent. rate 118 BPM CA interval 184 ms QRS duration 86 ms QT/QTc 358/501 ms P-R-T axes * -64 51      I&O's Detail      LABS:                        13.6   7.27  )-----------( 217      ( 28 Mar 2025 05:35 )             41.0     03-    137  |  104  |  22  ----------------------------<  124[H]  4.2   |  27  |  0.89    Ca    9.1      28 Mar 2025 05:35  Mg     2.3     -    TPro  7.4  /  Alb  3.7  /  TBili  0.7  /  DBili  x   /  AST  25  /  ALT  27  /  AlkPhos  91  03-28        PT/INR - ( 28 Mar 2025 05:35 )   PT: 11.2 sec;   INR: 0.95 ratio         PTT - ( 28 Mar 2025 05:35 )  PTT:32.3 sec  Urinalysis Basic - ( 28 Mar 2025 06:25 )    Color: Yellow / Appearance: Clear / S.010 / pH: x  Gluc: x / Ketone: Negative mg/dL  / Bili: Negative / Urobili: 0.2 mg/dL   Blood: x / Protein: Trace mg/dL / Nitrite: Negative   Leuk Esterase: Negative / RBC: 5-10 /HPF / WBC 0-2 /HPF   Sq Epi: x / Non Sq Epi: x / Bacteria: Occasional /HPF      I&O's Summary    BNP 1536  RADIOLOGY & ADDITIONAL STUDIES:   *********CHARTING IN PROGRESS***********      Patient is a 89 yo Female with PMHx of atrial tachycardia, HTN, HLD, Hypothyroidism who presents with a chief complaint of palpitations.     Interval HPI:   Patient was seen and examined at bedside. Pt endorses feeling better. Pt reports she was brought in due to urinary frequency at night that caused her to feel anxious and palpitations. She denies headache, dizziness, Chest pain, SOB, Abdominal pain, N/V.         PAST MEDICAL & SURGICAL HISTORY:  HTN - Hypertension      Hypercholesteremia      Atrial tachycardia      Hypothyroid      S/P Cataract Surgery  B/L      Liver cyst  removed    Outpatient cardio: Dr. Lugo  - [facility], last seen 2 mos ago, scheduled f/u in 2025.     ECHO  FINDINGS (from 2024):   1. Technically difficult image quality.   2. Left ventricular systolic function is normal with an ejection fraction of 68 % by Hinton's method of disks.   3. There is normal LV mass and concentric remodeling.   4. Normal right ventricular cavity size and normal systolic function.   5. The left atrium is normal.   6. Mitral valve leaflets have focal calcification.   7. Mild mitral regurgitation.   8. Trileaflet aortic valve with normal systolic excursion. There is focal calcification of the aortic valve leaflets.   9. Mild aortic regurgitation.  10. Estimated pulmonary artery systolic pressure is 35 mmHg, consistent with normal pulmonary artery pressure.  11. Echo-free space is noted in the liver consistent with cyst, however, dedicated imaging recommended for further evaluation if clinically indicated.      MEDICATIONS  (STANDING):    MEDICATIONS  (PRN):      FAMILY HISTORY:  FH: HTN (hypertension) (Mother)      Denies Family history of CAD or early MI      Constitutional: denies fever, chills  HEENT: denies blurry vision, difficulty hearing  Respiratory: denies SOB, SIMENTAL, cough  Cardiovascular: denies CP, palpitations, orthopnea, PND, LE edema  Gastrointestinal: denies nausea, vomiting, abdominal pain  Genitourinary: (+) urinary frequency  Skin: Denies rashes, itching  Neurologic: denies headache, weakness, dizziness  Hematology/Oncology: denies bleeding, easy bruising  ROS negative except as noted above      SOCIAL HISTORY:    No tobacco, Alcohol or Drug use    Vitals at bedside: /106,     Vital Signs Last 24 Hrs  T(C): 36.2 (28 Mar 2025 05:07), Max: 36.2 (28 Mar 2025 05:07)  T(F): 97.2 (28 Mar 2025 05:07), Max: 97.2 (28 Mar 2025 05:07)  HR: 104 (28 Mar 2025 06:35) (100 - 120)  BP: 157/96 (28 Mar 2025 06:35) (157/96 - 186/128)  BP(mean): --  RR: 20 (28 Mar 2025 06:35) (18 - 20)  SpO2: 98% (28 Mar 2025 06:35) (97% - 98%)    Parameters below as of 28 Mar 2025 06:35  Patient On (Oxygen Delivery Method): room air        Physical Exam:  General: Well developed, well nourished, NAD  HEENT: NCAT, PERRLA, EOMI bl, moist mucous membranes   Neck: Supple, nontender, no mass  Neurology: A&Ox3, nonfocal, sensation intact   Respiratory: CTA B/L, No W/R/R  CV: RRR, +S1/S2, no murmurs, rubs or gallops  Abdominal: Soft, NT, ND +BSx4, no palpable masses  Extremities: No C/C/E, + peripheral pulses  MSK: Normal ROM, no joint erythema or warmth, no joint swelling   Heme: No obvious ecchymosis or petechiae   Skin: warm, dry, normal color      ECG: Sinus tachycardia Left anterior fascicular block Minimal voltage criteria for LVH, may be normal variant ( [place] product ) Septal infarct , age undetermined Abnormal ECG When compared with ECG of 2024 23:25, Vent. rate has increased BY 66 BPM Septal infarct is now present T wave amplitude has increased in Lateral leads  Vent. rate 118 BPM MI interval 184 ms QRS duration 86 ms QT/QTc 358/501 ms P-R-T axes * -64 51      I&O's Detail      LABS:                        13.6   7.27  )-----------( 217      ( 28 Mar 2025 05:35 )             41.0     -    137  |  104  |  22  ----------------------------<  124[H]  4.2   |  27  |  0.89    Ca    9.1      28 Mar 2025 05:35  Mg     2.3     -    TPro  7.4  /  Alb  3.7  /  TBili  0.7  /  DBili  x   /  AST  25  /  ALT  27  /  AlkPhos  91          PT/INR - ( 28 Mar 2025 05:35 )   PT: 11.2 sec;   INR: 0.95 ratio         PTT - ( 28 Mar 2025 05:35 )  PTT:32.3 sec  Urinalysis Basic - ( 28 Mar 2025 06:25 )    Color: Yellow / Appearance: Clear / S.010 / pH: x  Gluc: x / Ketone: Negative mg/dL  / Bili: Negative / Urobili: 0.2 mg/dL   Blood: x / Protein: Trace mg/dL / Nitrite: Negative   Leuk Esterase: Negative / RBC: 5-10 /HPF / WBC 0-2 /HPF   Sq Epi: x / Non Sq Epi: x / Bacteria: Occasional /HPF      I&O's Summary    BNP 1536  RADIOLOGY & ADDITIONAL STUDIES:  Chest X ray performed on 25 --      Patient is a 89 yo Female with PMHx of atrial tachycardia, HTN, HLD, Hypothyroidism who presents with a chief complaint of palpitations.     HPI: 89 yo Female with PMHx of atrial tachycardia, HTN, HLD, Hypothyroidism who presents with a chief complaint of palpitations and urinary frequency. Palpitations began 5 hrs prior to ED arrival. She has hx of palpitations in the past. She was hospitalized at [facility] from 2024 to 2024 for palpitations and had her medications adjusted at that time. Her Cardizem, Toprol, and Clonidine medications were stopped. She was started on Sotalol and Lisinopril. She then went to her cardiologist (Dr. Parish [facility]) for follow up and began to take hydralazine 10mg BID which she has since weaned to 10mg daily.   Patient feels anxiety and increased urinary frequency may be contributing to her tachycardia. She denies fevers, headache, dizziness, Chest pain, SOB, Abdominal pain, N/V.         PAST MEDICAL & SURGICAL HISTORY:  HTN - Hypertension      Hypercholesteremia      Atrial tachycardia      Hypothyroid      S/P Cataract Surgery  B/L      Liver cyst  removed    Outpatient cardio: Dr. Parish Li [facility], last seen 2 mos ago, scheduled f/u in 2025.     ECHO  FINDINGS (from 2024):   1. Technically difficult image quality.   2. Left ventricular systolic function is normal with an ejection fraction of 68 % by Hinton's method of disks.   3. There is normal LV mass and concentric remodeling.   4. Normal right ventricular cavity size and normal systolic function.   5. The left atrium is normal.   6. Mitral valve leaflets have focal calcification.   7. Mild mitral regurgitation.   8. Trileaflet aortic valve with normal systolic excursion. There is focal calcification of the aortic valve leaflets.   9. Mild aortic regurgitation.  10. Estimated pulmonary artery systolic pressure is 35 mmHg, consistent with normal pulmonary artery pressure.  11. Echo-free space is noted in the liver consistent with cyst, however, dedicated imaging recommended for further evaluation if clinically indicated.      MEDICATIONS  (Home):  - Sotalol 40mg bid   - lisinopril 20mg bid   - levothyroxine 25mcg daily   - simvastatin 10mg daily   - hydralazine 10mg daily    MEDICATIONS  (PRN):      FAMILY HISTORY:  FH: HTN (hypertension) (Mother)      Denies Family history of CAD or early MI      Constitutional: denies fever, chills  HEENT: denies blurry vision, difficulty hearing  Respiratory: denies SOB, SIMENTAL, cough  Cardiovascular: denies CP, palpitations, orthopnea, PND, LE edema  Gastrointestinal: denies nausea, vomiting, abdominal pain  Genitourinary: (+) urinary frequency  Skin: Denies rashes, itching  Neurologic: denies headache, weakness, dizziness  Hematology/Oncology: denies bleeding, easy bruising  ROS negative except as noted above      SOCIAL HISTORY:    No tobacco, Alcohol or Drug use    Vitals at bedside: /106,     Vital Signs Last 24 Hrs  T(C): 36.2 (28 Mar 2025 05:07), Max: 36.2 (28 Mar 2025 05:07)  T(F): 97.2 (28 Mar 2025 05:07), Max: 97.2 (28 Mar 2025 05:07)  HR: 104 (28 Mar 2025 06:35) (100 - 120)  BP: 157/96 (28 Mar 2025 06:35) (157/96 - 186/128)  BP(mean): --  RR: 20 (28 Mar 2025 06:35) (18 - 20)  SpO2: 98% (28 Mar 2025 06:35) (97% - 98%)    Parameters below as of 28 Mar 2025 06:35  Patient On (Oxygen Delivery Method): room air        Physical Exam:  General: Well developed, well nourished, NAD  HEENT: NCAT, PERRLA, EOMI bl, moist mucous membranes   Neck: Supple, nontender, no mass  Neurology: A&Ox3, nonfocal, sensation intact   Respiratory: CTA B/L, No W/R/R  CV: +tachycardia, +S1/S2, no murmurs, rubs or gallops  Abdominal: Soft, NT, ND +BSx4, no palpable masses  Extremities: No C/C/E, + peripheral pulses  MSK: Normal ROM, no joint erythema or warmth, no joint swelling   Heme: No obvious ecchymosis or petechiae   Skin: warm, dry, normal color      ECG: Sinus tachycardia Left anterior fascicular block Minimal voltage criteria for LVH, may be normal variant ( [place] product ) Septal infarct , age undetermined Abnormal ECG When compared with ECG of 2024 23:25, Vent. rate has increased BY 66 BPM Septal infarct is now present T wave amplitude has increased in Lateral leads  Vent. rate 118 BPM OK interval 184 ms QRS duration 86 ms QT/QTc 358/501 ms P-R-T axes * -64 51      I&O's Detail      LABS:                        13.6   7.27  )-----------( 217      ( 28 Mar 2025 05:35 )             41.0         137  |  104  |  22  ----------------------------<  124[H]  4.2   |  27  |  0.89    Ca    9.1      28 Mar 2025 05:35  Mg     2.3         TPro  7.4  /  Alb  3.7  /  TBili  0.7  /  DBili  x   /  AST  25  /  ALT  27  /  AlkPhos  91          PT/INR - ( 28 Mar 2025 05:35 )   PT: 11.2 sec;   INR: 0.95 ratio         PTT - ( 28 Mar 2025 05:35 )  PTT:32.3 sec  Urinalysis Basic - ( 28 Mar 2025 06:25 )    Color: Yellow / Appearance: Clear / S.010 / pH: x  Gluc: x / Ketone: Negative mg/dL  / Bili: Negative / Urobili: 0.2 mg/dL   Blood: x / Protein: Trace mg/dL / Nitrite: Negative   Leuk Esterase: Negative / RBC: 5-10 /HPF / WBC 0-2 /HPF   Sq Epi: x / Non Sq Epi: x / Bacteria: Occasional /HPF      I&O's Summary    BNP 1536  RADIOLOGY & ADDITIONAL STUDIES:  Chest X ray performed on 25  (personal read): no acute cardiopulmonary disease noted

## 2025-03-28 NOTE — H&P ADULT - PROBLEM SELECTOR PLAN 1
89yo F with PMHx of atrial tachycardia, HTN, HLD, and hypothyroidism (not on meds anymore) presents to the ED with urinary frequency x1 day, associated with palpitations  - No leukocytosis, patient afebrile on admission; does not meet sepsis criteria on admission   - EKG: Premature Atrial Tachycardia , QTc 501 on admission   - UA negative for UTI; no recent ABx per patient and her daughter   - D-dimer 299 - age-adjusted d-dimer wnl   - Continue home sotalol 40mg bid pending stable QTc  - Most recent QTc 501  - Follow up repeat EKG  - Trops neg x2  - Previous TTE (01/2024) shows: Left ventricular systolic function is normal with an ejection fraction of 68 % by Hinton's method of disks. Mild mitral regurgitation. Mild aortic regurgitation.  - Monitor and replete lytes, keep K>4, Mg>2  - Monitor hemodynamics   - Cardio ([facility]) consulted, recs appreciated 89yo F with PMHx of atrial tachycardia, HTN, HLD, and hypothyroidism (not on meds anymore) presents to the ED with urinary frequency x1 day, associated with palpitations  - No leukocytosis, patient afebrile on admission; does not meet sepsis criteria on admission   - EKG: Premature Atrial Tachycardia , QTc 501 on admission   - UA negative for UTI; no recent ABx per patient and her daughter   - D-dimer 299 - age-adjusted d-dimer wnl   - S/p hydralazine 10mg PO x1, Lopressor 5mg IV x1, 500cc IV NS bolus x1   - Continue home sotalol 40mg BID pending stable QTc  - Most recent QTc 501  - Follow up repeat EKG  - Trops neg x2  - Previous TTE (01/2024) shows: Left ventricular systolic function is normal with an ejection fraction of 68 % by Hinton's method of disks. Mild mitral regurgitation. Mild aortic regurgitation.  - Monitor and replete lytes, keep K>4, Mg>2  - Monitor hemodynamics   - Cardio ([facility]) consulted, recs appreciated 89yo F with PMHx of atrial tachycardia, HTN, HLD, and hypothyroidism (not on meds anymore) presents to the ED with urinary frequency x1 day, associated with palpitations  - No leukocytosis, patient afebrile on admission; does not meet sepsis criteria on admission   - EKG: Premature Atrial Tachycardia , QTc 501 on admission   - UA negative for UTI; no recent ABx per patient and her daughter   - D-dimer 299 - age-adjusted d-dimer wnl   - S/p hydralazine 10mg PO x1, Lopressor 5mg IV x1, 500cc IV NS bolus x1   - Continue home sotalol 40mg BID pending stable QTc  - Most recent QTc 501  - Follow up repeat EKG  - Trops neg x2  - Previous TTE (01/2024) shows: Left ventricular systolic function is normal with an ejection fraction of 68 % by Hinton's method of disks. Mild mitral regurgitation. Mild aortic regurgitation.  - Monitor and replete lytes, keep K>4, Mg>2  - Monitor on Telemetry   - Monitor hemodynamics   - Cardio ([facility]) consulted, recs appreciated

## 2025-03-28 NOTE — H&P ADULT - NSCORESITESY/N_GEN_A_CORE_RD
Patient is here for a Nurse Check.      Is the patient on an active anti-cancer treatment plan? No, Nursing Assessment:  A comprehensive nursing assessment was performed and the patient reports the following:    Nausea: NO  Vomiting: NO  Fever: NO  Chills: NO  Other signs of infection: NO  Bleeding: NO  Mucositis: NO  Diarrhea: NO  Constipation: NO  Anorexia: NO  Dysuria: NO  Urinary Bleeding: NO  Cough: NO  Shortness of Breath: NO  Fatigue/Weakness: YES - fatigue  Numbness/Tingling: NO  Other Neuropathies: NO  Edema: NO  Rash: NO  Hand/Foot Syndrome: NO  Anxiety/Depression/Insomnia: NO  Pain: NO     WBC (K/mcL)   Date Value   12/08/2022 4.7     RBC (mil/mcL)   Date Value   12/08/2022 2.31 (L)     HCT (%)   Date Value   12/08/2022 21.1 (L)     HGB (g/dL)   Date Value   12/08/2022 6.8 (LL)     PLT (K/mcL)   Date Value   12/08/2022 77 (L)     Patient's Hgb is 6.8 today. Patient will need transfusion today. Writer spoke with Kaitlin GRIFFITHS at [facility] and set up patient for one unit of RBC at 1300 today. Patient agreeable with plan of care.     video used for sign language.      Is this patient status post Stem Cell Transplant? No     Dr. Jones is supervising clinician today.    Vitals:    12/08/22 0912   BP: 113/58   BP Location: RUE - Right upper extremity   Patient Position: Sitting   Pulse: 94   Resp: 16   Temp: 98.6 °F (37 °C)   TempSrc: Oral   SpO2: 100%        Central Line Care: See Invasive (Oncology) Lines Flowsheet and MAR for CVAD documentation as appropriate    Transfusion: Required    Education: Patient Education: no new information needed    Next appointment scheduled:   Future Appointments   Date Time Provider Department Center   12/8/2022  1:00 PM [facility] IV AMB SVC 2 [facility]IV [facility]   12/15/2022  9:00 AM [facility]2 ACL LAB ACLSLMA[facility]CSL [facility]   12/15/2022  9:15 AM [facility]2 INFUSION RN [facility]2 [facility]   12/20/2022  1:30 PM [facility]2 ACL LAB ACLSLMA[facility]CSL [facility]   12/20/2022  2:00 PM  Lamine Jones MD [facility]2 [facility]   1/10/2023  9:00 AM Khanh Conrad MD [facility] AL[facility]   1/12/2023  9:40 AM [facility]  RESOURCE [facility] B   1/16/2023  8:40 AM [facility]  [place] [facility]    1/26/2023  9:00 AM [facility]  RESOURCE [facility]    2/9/2023  9:00 AM Teddy Bob MD [facility]   2/22/2023  3:00 PM Jalen Wall MD [facility]         Patient instructed to call the office with any questions or concerns.    Patient Discharged: patient discharged to home per self, ambulatory     Yes

## 2025-03-28 NOTE — ED ADULT NURSE REASSESSMENT NOTE - NS ED NURSE REASSESS COMMENT FT1
pt straight catheterized for urine specimen, collected and sent to lab. pt placed on purewick, suction applied. safety measures maintained, side rails up x2
pt waiting for cardio consult. pt denies CP, palpitations, SON, headache and dizziness. pt updated on plan of care

## 2025-03-28 NOTE — PATIENT PROFILE ADULT - FALL HARM RISK - HARM RISK INTERVENTIONS

## 2025-03-28 NOTE — ED ADULT NURSE NOTE - CHPI ED NUR SYMPTOMS POS
Patient called and states that she is still having chest pain with any activity. If she is sitting still, she has slight chest pain. When she stands up to do anything, chest pain increases and she feels SOB. SOB and increased chest pain recovers after 5-20 minutes of rest. Pt states that symptoms may have gotten worse since increasing Imdur to 60 mg 7/15/24 after LHC. Pt has not used SL nitro with chest pain.    Pt does not monitor BP/HR at home.    Please advise.    PALPITATIONS

## 2025-03-28 NOTE — CARE COORDINATION ASSESSMENT. - NSCAREPROVIDERS_GEN_ALL_CORE_FT
CARE PROVIDERS:  Accepting Physician: Bimal Sainz  Access Services: Arlene Nugent  Administration: Neptali Vega  Administration: Kt Dumont  Administration: Francisco Arenas  Administration: Odessa Ortega  Admitting: Bimal Sainz  Attending: Bimal Sainz  Consultant: Aaron Joe  Consultant: Lety Douglas  Consultant: Rose Lowry  Consultant: Vidhi Arguelles  Covering Nurse: Rainer Coleman  Covering Team: Ema Curry  ED Attending: Benito Ortiz  ED Attending2: Doe Kellogg  ED Nurse: Raudel Hubbard  Nurse: Sangeetha Prado  Nurse: Raudel Hubbard  Ordered: ServiceAccount, [facility] Kennedy [facility] CenterMLM  Outpatient Provider: Anthony Mondragon  Outpatient Provider: Eliseo Jarquin  Outpatient Provider: Drea Lugo  PCA/Nursing Assistant: Josie Gonzales  Primary Team: Benito rOtiz  : Latonya Bundy  Student: Kayla Forrester  UR// Supp. Assoc.: Maryan Lugo  UR// Supp. Assoc.: Harsha Siddiqi

## 2025-03-28 NOTE — ED ADULT NURSE NOTE - OBJECTIVE STATEMENT
hx htn, states recent changes to bp medications. + increased blood pressure today with palpitations and feelings of anxiety. pt also reporting increased urinary frequency. connected to cardiac monitor, vitals as documented. 20G iv placed to left AC, blood drawn and sent to lab. iv fluids infusing well without complications, iv site WNL. tolerated po med well with water, no coughing noted. pt A&OX4, able to speak in clear complete sentences, TINSLEY equal bilaterally, RR even and unlabored, skin warm dry and intact. safety measures maintained, side rails up x2. daughter at bedside

## 2025-03-28 NOTE — ED ADULT NURSE REASSESSMENT NOTE - NSFALLRISKINTERV_ED_ALL_ED

## 2025-03-28 NOTE — H&P ADULT - PROBLEM SELECTOR PLAN 5
Patient states she used to be on meds but they were d/c'ed a while ago.  - Recommend outpatient follow up

## 2025-03-28 NOTE — PATIENT PROFILE ADULT - FUNCTIONAL ASSESSMENT - BASIC MOBILITY 6.
4-calculated by average/Not able to assess (calculate score using [facility] averaging method)  4-calculated by average/Not able to assess (calculate score using [facility] averaging method)

## 2025-03-28 NOTE — H&P ADULT - NSHPPHYSICALEXAM_GEN_ALL_CORE
T(C): 36.4 (03-28-25 @ 07:55), Max: 36.4 (03-28-25 @ 07:55)  HR: 109 (03-28-25 @ 07:55) (100 - 120)  BP: 150/85 (03-28-25 @ 09:11) (150/85 - 186/128)  RR: 18 (03-28-25 @ 07:55) (18 - 20)  SpO2: 95% (03-28-25 @ 07:55) (95% - 98%)    GENERAL: patient appears well, no acute distress, appropriate, pleasant  EYES: sclera clear, no exudates  ENMT: oropharynx clear without erythema, no exudates, moist mucous membranes  NECK: supple, soft, no thyromegaly noted  LUNGS: good air entry bilaterally, clear to auscultation, symmetric breath sounds, no wheezing or rhonchi appreciated  HEART: soft S1/S2, regular rate and rhythm, no murmurs noted, no lower extremity edema  GASTROINTESTINAL: abdomen is soft, nontender, nondistended, normoactive bowel sounds, no palpable masses  INTEGUMENT: good skin turgor, no lesions noted  MUSCULOSKELETAL: no clubbing or cyanosis, no obvious deformity  NEUROLOGIC: awake, alert, oriented x3, good muscle tone in 4 extremities, no obvious sensory deficits  PSYCHIATRIC: mood is good, affect is congruent, linear and logical thought process  HEME/LYMPH: no palpable supraclavicular nodules, no obvious ecchymosis or petechiae T(C): 36.4 (03-28-25 @ 07:55), Max: 36.4 (03-28-25 @ 07:55)  HR: 109 (03-28-25 @ 07:55) (100 - 120)  BP: 150/85 (03-28-25 @ 09:11) (150/85 - 186/128)  RR: 18 (03-28-25 @ 07:55) (18 - 20)  SpO2: 95% (03-28-25 @ 07:55) (95% - 98%)    GENERAL: patient appears mildly anxious, pleasant  EYES: sclera clear, no exudates  ENMT: oropharynx clear without erythema, no exudates, moist mucous membranes  NECK: supple, soft, no thyromegaly noted  LUNGS: good air entry bilaterally, clear to auscultation, symmetric breath sounds, no wheezing or rhonchi appreciated  HEART:  S1/S2 present, + tachycardic, regular rhythm, no murmurs noted, no lower extremity edema  GASTROINTESTINAL: abdomen is soft, nontender, nondistended, normoactive bowel sounds, no palpable masses  GENITOURINARY: no suprapubic tenderness, no CVA tenderness   INTEGUMENT: good skin turgor, no lesions noted  MUSCULOSKELETAL: no clubbing or cyanosis, no obvious deformity  NEUROLOGIC: awake, alert, oriented x3, good muscle tone in 4 extremities, no obvious motor or sensory deficits  PSYCHIATRIC: mildly anxious, mood is good, affect is congruent, linear and logical thought process  HEME/LYMPH: no palpable supraclavicular nodules, no obvious ecchymosis or petechiae T(C): 36.4 (03-28-25 @ 07:55), Max: 36.4 (03-28-25 @ 07:55)  HR: 109 (03-28-25 @ 07:55) (100 - 120)  BP: 150/85 (03-28-25 @ 09:11) (150/85 - 186/128)  RR: 18 (03-28-25 @ 07:55) (18 - 20)  SpO2: 95% (03-28-25 @ 07:55) (95% - 98%)    GENERAL: patient appears mildly anxious, pleasant  EYES: sclera clear, no exudates  ENMT: oropharynx clear without erythema, no exudates, moist mucous membranes  NECK: supple, soft, no thyromegaly noted  LUNGS: good air entry bilaterally, clear to auscultation, symmetric breath sounds, no wheezing or rhonchi appreciated  HEART:  S1/S2 present, + tachycardic, regular rhythm, no murmurs noted, no lower extremity edema, no calf tenderness   GASTROINTESTINAL: abdomen is soft, nontender, nondistended, normoactive bowel sounds, no palpable masses  GENITOURINARY: no suprapubic tenderness, no CVA tenderness   INTEGUMENT: good skin turgor, no lesions noted  MUSCULOSKELETAL: no clubbing or cyanosis, no obvious deformity  NEUROLOGIC: awake, alert, oriented x3, good muscle tone in 4 extremities, no obvious motor or sensory deficits  PSYCHIATRIC: mildly anxious, mood is good, affect is congruent, linear and logical thought process  HEME/LYMPH: no palpable supraclavicular nodules, no obvious ecchymosis or petechiae

## 2025-03-28 NOTE — H&P ADULT - PROBLEM SELECTOR PLAN 2
/128 --> 150/85 on admission. On home fosinopril 20mg BID, hydralazine 10mg qd   - BP elevated on admission,  stable at this time  - Lisinopril 20mg BID - therapeutic interchange, as fosinopril no available on formulary   - Continue home hydralazine with hold parameters   - Monitor hemodynamics

## 2025-03-28 NOTE — H&P ADULT - NSICDXFAMILYHX_GEN_ALL_CORE_FT
FAMILY HISTORY:  Mother  Still living? Unknown  FH: HTN (hypertension), Age at diagnosis: Age Unknown     FAMILY HISTORY:  Mother  Still living? Unknown  FH: HTN (hypertension), Age at diagnosis: Age Unknown    Sibling  Still living? Unknown  FH: type 2 diabetes mellitus, Age at diagnosis: Age Unknown

## 2025-03-28 NOTE — CONSULT NOTE ADULT - ATTENDING COMMENTS
Nehal is a 89 yo Female with atrial tachycardia, HTN, HLD, Hypothyroidism who presents with a chief complaint of urinary frequency and palpitations.    - presenting ekg and telemetry suggest an atrial tachycardia at around 118 bpm  - had very similar admission back in 2024 and was started on sotalol. When in a nsr, her heart rates were in the 60s  - would give lopressor 5 mg iv x 1  - cont sotalol 40 bid, though need to repeat ekg to evaluate qtc. If long, will transition back to lopressor 25 q6hr  - echocardiogram  - no sign of acute ischemia or volume overload  - cont home anti hypertensives  - trend creatinine and electrolytes. Keep K>4, mg>2  - will follow with you

## 2025-03-28 NOTE — H&P ADULT - PROBLEM SELECTOR PLAN 3
Chronic. Not on home meds anymore.  - Patient reports statin was d/c'ed a few a years ago Chronic. Not on home meds anymore.  - Patient reports statin was d/c'ed a few a years ago  - Recommend outpatient follow up

## 2025-03-29 ENCOUNTER — TRANSCRIPTION ENCOUNTER (OUTPATIENT)
Age: 89
End: 2025-03-29

## 2025-03-29 DIAGNOSIS — R78.81 BACTEREMIA: ICD-10-CM

## 2025-03-29 LAB
-  COAGULASE NEGATIVE STAPHYLOCOCCUS: SIGNIFICANT CHANGE UP
ALBUMIN SERPL ELPH-MCNC: 2.8 G/DL — LOW (ref 3.3–5)
ALP SERPL-CCNC: 71 U/L — SIGNIFICANT CHANGE UP (ref 40–120)
ALT FLD-CCNC: 21 U/L — SIGNIFICANT CHANGE UP (ref 12–78)
ANION GAP SERPL CALC-SCNC: 7 MMOL/L — SIGNIFICANT CHANGE UP (ref 5–17)
AST SERPL-CCNC: 25 U/L — SIGNIFICANT CHANGE UP (ref 15–37)
BASOPHILS # BLD AUTO: 0.02 K/UL — SIGNIFICANT CHANGE UP (ref 0–0.2)
BASOPHILS NFR BLD AUTO: 0.2 % — SIGNIFICANT CHANGE UP (ref 0–2)
BILIRUB SERPL-MCNC: 0.7 MG/DL — SIGNIFICANT CHANGE UP (ref 0.2–1.2)
BUN SERPL-MCNC: 20 MG/DL — SIGNIFICANT CHANGE UP (ref 7–23)
CALCIUM SERPL-MCNC: 9 MG/DL — SIGNIFICANT CHANGE UP (ref 8.5–10.1)
CHLORIDE SERPL-SCNC: 105 MMOL/L — SIGNIFICANT CHANGE UP (ref 96–108)
CO2 SERPL-SCNC: 24 MMOL/L — SIGNIFICANT CHANGE UP (ref 22–31)
CREAT SERPL-MCNC: 0.81 MG/DL — SIGNIFICANT CHANGE UP (ref 0.5–1.3)
EGFR: 67 ML/MIN/1.73M2 — SIGNIFICANT CHANGE UP
EGFR: 67 ML/MIN/1.73M2 — SIGNIFICANT CHANGE UP
EOSINOPHIL # BLD AUTO: 0.11 K/UL — SIGNIFICANT CHANGE UP (ref 0–0.5)
EOSINOPHIL NFR BLD AUTO: 1.3 % — SIGNIFICANT CHANGE UP (ref 0–6)
GLUCOSE SERPL-MCNC: 88 MG/DL — SIGNIFICANT CHANGE UP (ref 70–99)
GRAM STN FLD: ABNORMAL
HCT VFR BLD CALC: 38.3 % — SIGNIFICANT CHANGE UP (ref 34.5–45)
HGB BLD-MCNC: 12.7 G/DL — SIGNIFICANT CHANGE UP (ref 11.5–15.5)
IMM GRANULOCYTES NFR BLD AUTO: 0.2 % — SIGNIFICANT CHANGE UP (ref 0–0.9)
LYMPHOCYTES # BLD AUTO: 0.91 K/UL — LOW (ref 1–3.3)
LYMPHOCYTES # BLD AUTO: 11.1 % — LOW (ref 13–44)
MAGNESIUM SERPL-MCNC: 2 MG/DL — SIGNIFICANT CHANGE UP (ref 1.6–2.6)
MCHC RBC-ENTMCNC: 29.6 PG — SIGNIFICANT CHANGE UP (ref 27–34)
MCHC RBC-ENTMCNC: 33.2 G/DL — SIGNIFICANT CHANGE UP (ref 32–36)
MCV RBC AUTO: 89.3 FL — SIGNIFICANT CHANGE UP (ref 80–100)
METHOD TYPE: SIGNIFICANT CHANGE UP
MONOCYTES # BLD AUTO: 1.18 K/UL — HIGH (ref 0–0.9)
MONOCYTES NFR BLD AUTO: 14.4 % — HIGH (ref 2–14)
NEUTROPHILS # BLD AUTO: 5.97 K/UL — SIGNIFICANT CHANGE UP (ref 1.8–7.4)
NEUTROPHILS NFR BLD AUTO: 72.8 % — SIGNIFICANT CHANGE UP (ref 43–77)
NRBC BLD AUTO-RTO: 0 /100 WBCS — SIGNIFICANT CHANGE UP (ref 0–0)
PHOSPHATE SERPL-MCNC: 3.8 MG/DL — SIGNIFICANT CHANGE UP (ref 2.5–4.5)
PLATELET # BLD AUTO: 190 K/UL — SIGNIFICANT CHANGE UP (ref 150–400)
POTASSIUM SERPL-MCNC: 4.5 MMOL/L — SIGNIFICANT CHANGE UP (ref 3.5–5.3)
POTASSIUM SERPL-SCNC: 4.5 MMOL/L — SIGNIFICANT CHANGE UP (ref 3.5–5.3)
PROT SERPL-MCNC: 6.5 G/DL — SIGNIFICANT CHANGE UP (ref 6–8.3)
RBC # BLD: 4.29 M/UL — SIGNIFICANT CHANGE UP (ref 3.8–5.2)
RBC # FLD: 14.3 % — SIGNIFICANT CHANGE UP (ref 10.3–14.5)
SODIUM SERPL-SCNC: 136 MMOL/L — SIGNIFICANT CHANGE UP (ref 135–145)
SPECIMEN SOURCE: SIGNIFICANT CHANGE UP
SPECIMEN SOURCE: SIGNIFICANT CHANGE UP
WBC # BLD: 8.21 K/UL — SIGNIFICANT CHANGE UP (ref 3.8–10.5)
WBC # FLD AUTO: 8.21 K/UL — SIGNIFICANT CHANGE UP (ref 3.8–10.5)

## 2025-03-29 PROCEDURE — 99233 SBSQ HOSP IP/OBS HIGH 50: CPT | Mod: GC

## 2025-03-29 PROCEDURE — 93010 ELECTROCARDIOGRAM REPORT: CPT

## 2025-03-29 PROCEDURE — 99232 SBSQ HOSP IP/OBS MODERATE 35: CPT

## 2025-03-29 RX ORDER — CEFTRIAXONE 500 MG/1
1000 INJECTION, POWDER, FOR SOLUTION INTRAMUSCULAR; INTRAVENOUS EVERY 24 HOURS
Refills: 0 | Status: DISCONTINUED | OUTPATIENT
Start: 2025-03-30 | End: 2025-03-30

## 2025-03-29 RX ORDER — METOPROLOL SUCCINATE 50 MG/1
25 TABLET, EXTENDED RELEASE ORAL EVERY 8 HOURS
Refills: 0 | Status: DISCONTINUED | OUTPATIENT
Start: 2025-03-29 | End: 2025-03-30

## 2025-03-29 RX ORDER — CEFTRIAXONE 500 MG/1
1000 INJECTION, POWDER, FOR SOLUTION INTRAMUSCULAR; INTRAVENOUS EVERY 24 HOURS
Refills: 0 | Status: COMPLETED | OUTPATIENT
Start: 2025-03-29 | End: 2025-03-29

## 2025-03-29 RX ADMIN — LISINOPRIL 20 MILLIGRAM(S): 5 TABLET ORAL at 17:01

## 2025-03-29 RX ADMIN — METOPROLOL SUCCINATE 25 MILLIGRAM(S): 50 TABLET, EXTENDED RELEASE ORAL at 08:01

## 2025-03-29 RX ADMIN — Medication 650 MILLIGRAM(S): at 13:11

## 2025-03-29 RX ADMIN — METOPROLOL SUCCINATE 25 MILLIGRAM(S): 50 TABLET, EXTENDED RELEASE ORAL at 21:49

## 2025-03-29 RX ADMIN — Medication 2 TABLET(S): at 21:49

## 2025-03-29 RX ADMIN — LISINOPRIL 20 MILLIGRAM(S): 5 TABLET ORAL at 05:52

## 2025-03-29 RX ADMIN — ENOXAPARIN SODIUM 40 MILLIGRAM(S): 100 INJECTION SUBCUTANEOUS at 17:00

## 2025-03-29 RX ADMIN — METOPROLOL SUCCINATE 25 MILLIGRAM(S): 50 TABLET, EXTENDED RELEASE ORAL at 13:01

## 2025-03-29 RX ADMIN — CEFTRIAXONE 100 MILLIGRAM(S): 500 INJECTION, POWDER, FOR SOLUTION INTRAMUSCULAR; INTRAVENOUS at 07:41

## 2025-03-29 RX ADMIN — Medication 650 MILLIGRAM(S): at 14:05

## 2025-03-29 RX ADMIN — Medication 10 MILLIGRAM(S): at 13:01

## 2025-03-29 RX ADMIN — Medication 10 MILLIGRAM(S): at 21:50

## 2025-03-29 RX ADMIN — Medication 10 MILLIGRAM(S): at 05:52

## 2025-03-29 NOTE — DISCHARGE NOTE PROVIDER - PROVIDER TOKENS
PROVIDER:[TOKEN:[[id number]],FOLLOWUP:[1 week],ESTABLISHEDPATIENT:[T]],PROVIDER:[TOKEN:[[id number]],FOLLOWUP:[1 week]] PROVIDER:[TOKEN:[[id number]],FOLLOWUP:[1-3 days],ESTABLISHEDPATIENT:[T]],PROVIDER:[TOKEN:[[id number]],FOLLOWUP:[1 week]]

## 2025-03-29 NOTE — DISCHARGE NOTE PROVIDER - CARE PROVIDERS DIRECT ADDRESSES
[email or web address]

## 2025-03-29 NOTE — PROGRESS NOTE ADULT - PROBLEM SELECTOR PLAN 6
Patient states she used to be on meds but they were d/c'ed a while ago.  - Recommend outpatient follow up Patient states she used to be on meds but they were d/c'ed a while ago.  - TSH in AM  - Recommend outpatient follow up

## 2025-03-29 NOTE — PROGRESS NOTE ADULT - PROBLEM SELECTOR PLAN 3
/128 --> 150/85 on admission. On home fosinopril 20mg BID, hydralazine 10mg qd   - BP elevated on admission,  stable at this time  - Continue lisinopril interchange.   - Hydralazine 10mg TID  - Continue home hydralazine with hold parameters   - Monitor hemodynamics

## 2025-03-29 NOTE — CHART NOTE - NSCHARTNOTEFT_GEN_A_CORE
Called by RN. Blood culture showed growth in aerobic bottle: gram positive cocci in clusters. Pt afebrile since admission, U/A neg, WBC WNL    positive blood culture, likely contaminant  -no indications for antibiotics at this time  -repeat BC ordered  -primary team to reassess in AM Called by RN. Blood culture showed growth in aerobic bottle: gram positive cocci in clusters. Pt afebrile since admission, U/A neg, WBC WNL. Did not met sepsis criteria on admission.     Positive blood culture, likely contaminant  -no indications for antibiotics at this time  -repeat BC ordered  -primary team to reassess in AM and f/u BC result Called by RN. Blood culture showed growth in aerobic bottle: gram positive cocci in clusters. A second bottle positive for coagulase negative staph. Pt afebrile since admission, U/A neg, WBC WNL. Did not met sepsis criteria on admission.     Positive blood culture, likely contaminant  -  -repeat BCx2 ordered STAT  -primary team to reassess in AM and f/u BC result Called by RN. Blood culture showed growth in aerobic bottle: gram positive cocci in clusters. A second culture positive for coagulase negative staph. Pt afebrile since admission, U/A neg, WBC WNL. Did not met sepsis criteria on admission.     Positive blood culture, possible contaminant, however growth on 2 cultures  -Rocephin 1g IV STAT ordered, primary team to reassess need for further antibiotics in AM  -repeat BCx2 ordered STAT  -primary team to reassess in AM and f/u BC result

## 2025-03-29 NOTE — PROVIDER CONTACT NOTE (CRITICAL VALUE NOTIFICATION) - TEST AND RESULT REPORTED:
Called [facility] pharmacy and gave required information in order to get patient medication refilled.    Pre-whelan Aerobic bottle + Cocci in Clusters

## 2025-03-29 NOTE — PROGRESS NOTE ADULT - PROBLEM SELECTOR PLAN 2
- Blood culture showed growth in aerobic bottle: gram positive cocci in clusters. s/p rocephin x1  - BCx x2 ordered again, f/u results.  - Monitor fever curve. No leukocytosis. - Blood culture showed growth in aerobic bottle: gram positive cocci in clusters. s/p rocephin x1  - Rocephin IV ordered now  - BCx x2 ordered again, f/u results.  - Monitor fever curve. No leukocytosis.

## 2025-03-29 NOTE — DISCHARGE NOTE PROVIDER - NSDCMRMEDTOKEN_GEN_ALL_CORE_FT
fosinopril 20 mg oral tablet: 1 tab(s) orally 2 times a day  hydrALAZINE 10 mg oral tablet: 1 tab(s) orally once a day  sotalol 80 mg oral tablet: 0.5 tab(s) orally 2 times a day   amLODIPine 5 mg oral tablet: 1 tab(s) orally once a day  flecainide 50 mg oral tablet: 1 tab(s) orally every 12 hours  metoprolol tartrate 50 mg oral tablet: 1 tab(s) orally every 8 hours   amLODIPine 5 mg oral tablet: 1 tab(s) orally once a day  flecainide 50 mg oral tablet: 1 tab(s) orally every 12 hours  lisinopril 20 mg oral tablet: 1 tab(s) orally 2 times a day  metoprolol tartrate 50 mg oral tablet: 1 tab(s) orally every 8 hours

## 2025-03-29 NOTE — DISCHARGE NOTE PROVIDER - NSDCCPCAREPLAN_GEN_ALL_CORE_FT
PRINCIPAL DISCHARGE DIAGNOSIS  Diagnosis: Atrial tachycardia  Assessment and Plan of Treatment: You were admitted to the hospital with an abnormal heart rhythm called atrial tachycardia. Infection, pulmonary embolism and structural heart disease was ruled out during your hospital stay with blood work, Echocardiogram, and CTA of your chest. You were started on medication called Metoprolol Tartrate and Felcainide.   Please continue Taking your metoprolol 50 mg three times daily (Every 8 hours) and Flecainide 50 mg two times daily (every 12 hours). Hold your home sotalol upon discharge.   Please follow up with your cardiologist Dr. Lugo within 1 week of discharge. Please follow up with Electrophysiologist Dr. Robertson within 1 week of discharge.     PRINCIPAL DISCHARGE DIAGNOSIS  Diagnosis: Atrial tachycardia  Assessment and Plan of Treatment: You were admitted to the hospital with an abnormal heart rhythm called atrial tachycardia. Infection, pulmonary embolism and structural heart disease was ruled out during your hospital stay with blood work, Echocardiogram, and CTA of your chest. You were started on medication called Metoprolol Tartrate and Felcainide.   Please continue Taking your metoprolol 50 mg three times daily (Every 8 hours) and Flecainide 50 mg two times daily (every 12 hours). Hold your home sotalol upon discharge.  We started you on amlodipine 5mg daily. Please continue to take this. Hold your home fosinopril and hydralazine until seen by Dr. Robertson.   Please follow up with your cardiologist Dr. Lugo within 1 week of discharge. Please follow up with Electrophysiologist Dr. Robertson within 1 week of discharge.     PRINCIPAL DISCHARGE DIAGNOSIS  Diagnosis: Atrial tachycardia  Assessment and Plan of Treatment: You were admitted to the hospital with an abnormal heart rhythm called atrial tachycardia. Infection, pulmonary embolism and structural heart disease was ruled out during your hospital stay with blood work, Echocardiogram, and CTA of your chest. You were started on medication called Metoprolol Tartrate and Felcainide.   Please continue Taking your metoprolol 50 mg three times daily (Every 8 hours) and Flecainide 50 mg two times daily (every 12 hours). Hold your home sotalol upon discharge.   Please follow up with your cardiologist Dr. Lugo within 1 week of discharge. Please follow up with Electrophysiologist Dr. Robertson within 1 week of discharge.  follow up PCP in 2-3 days      SECONDARY DISCHARGE DIAGNOSES  Diagnosis: Benign essential HTN  Assessment and Plan of Treatment: We started you on amlodipine 5mg daily. Please continue to take this. c/w lisinopril and metoprolol until seen by Dr. Robertson.     PRINCIPAL DISCHARGE DIAGNOSIS  Diagnosis: Atrial tachycardia  Assessment and Plan of Treatment: You were admitted to the hospital with an abnormal heart rhythm called atrial tachycardia. Infection, pulmonary embolism and structural heart disease was ruled out during your hospital stay with blood work, Echocardiogram, and CTA of your chest. You were started on medication called Metoprolol Tartrate and Felcainide.   Please continue Taking your metoprolol 50 mg three times daily (Every 8 hours) and Flecainide 50 mg two times daily (every 12 hours). Hold your home sotalol upon discharge.   Please follow up with your cardiologist Dr. Lugo within 1 week of discharge. Please follow up with Electrophysiologist Dr. Robertson within 1 week of discharge.  follow up PCP in 2-3 days      SECONDARY DISCHARGE DIAGNOSES  Diagnosis: Benign essential HTN  Assessment and Plan of Treatment: We started you on amlodipine 5mg daily. Please continue to take this. c/w lisinopril and metoprolol until seen by Dr. Robertson.    Diagnosis: Hepatic cyst  Assessment and Plan of Treatment: Us abd : Large hepatic cyst as noted on prior exams. LFT WNL, follow up PCP and suggest GI follow up

## 2025-03-29 NOTE — PROGRESS NOTE ADULT - PROBLEM SELECTOR PLAN 4
Chronic. Not on home meds anymore.  - Patient reports statin was d/c'ed a few a years ago  - Recommend outpatient follow up Chronic. Not on home meds anymore.  - Patient reports statin was d/c'ed a few a years ago  - Lipid profile in AM  - Recommend outpatient follow up

## 2025-03-29 NOTE — PROVIDER CONTACT NOTE (CRITICAL VALUE NOTIFICATION) - NAME OF MD/NP/PA/DO NOTIFIED:
Dr. GUERLINE Carrera Orbicularis Oris Muscle Flap Text: The defect edges were debeveled with a #15 scalpel blade.  Given that the defect affected the competency of the oral sphincter an orbicularis oris muscle flap was deemed most appropriate to restore this competency and normal muscle function.  Using a sterile surgical marker, an appropriate flap was drawn incorporating the defect. The area thus outlined was incised with a #15 scalpel blade.

## 2025-03-29 NOTE — PROGRESS NOTE ADULT - ASSESSMENT
89 yo Female with PMHx of atrial tachycardia, HTN, HLD, Hypothyroidism who presents with a chief complaint of urinary frequency and palpitations.     Atrial tachycardia  - EKG: Premature Atrial Tachycardia , follow up repeat EKG (ordered)  - s/p  IV metoprolol 5mg push x1 and po sotalol 40mg x1.   - Continue home sotalol 40mg BID   - No clear evidence of acute ischemia  - no anginal complaints   - trops negativex2     - No meaningful evidence of volume overload.  - TTE (01/2024) shows: Left ventricular systolic function is normal with an ejection fraction of 68 %     - Tele: SR 90's , no events recorded   - -160's  - continue hydralazine increase to 10 mg PO TID   - continue Lisinopril  - continue monitoring routine hemodynamics.    - Monitor and replete Lytes. Keep K > 4 and Mg > 2  - Will continue to follow.    Lucero Smith [facility]  Nurse Practitioner - Cardiology   call TEAMS      89 yo Female with PMHx of atrial tachycardia, HTN, HLD, Hypothyroidism who presents with a chief complaint of urinary frequency and palpitations.     Atrial tachycardia  - EKG: Premature Atrial Tachycardia , follow up repeat EKG (ordered)  - s/p IV metoprolol 5mg push x1 and PO sotalol 40mg x1.   - Tele: SR vs AT 's  - on home sotalol would switch to Metoprolol 25 mg PO TID with hold parameters, please give first dose now   - No clear evidence of acute ischemia  - trops negativex2   - no anginal complaints     - No meaningful evidence of volume overload.  - TTE (01/2024) shows: Left ventricular systolic function is normal with an ejection fraction of 68 %   - f/u TTE     - BP labile 110-160's  - continue hydralazine increase to 10 mg PO TID   - continue Lisinopril  - continue monitoring routine hemodynamics.  - Monitor and replete Lytes. Keep K > 4 and Mg > 2    - + Blood cx, management per primary   - Will continue to follow.    Lucero Smith [facility]  Nurse Practitioner - Cardiology   call TEAMS

## 2025-03-29 NOTE — CONSULT NOTE ADULT - SUBJECTIVE AND OBJECTIVE BOX
[facility]  Gabriel Tolbert MD PhD, Karyn Whitlokc MD, Sofia Champion MD, Lisy Arellano MD, Andrew Hu MD  and providing coverage with Ashwin Arreola MD  Providing Infectious Disease Consultations at [facility], [facility], [facility], [facility]'s    Office# [phone] to schedule follow up appointments  Answering Service for urgent calls or New Consults [phone]  Cell# to text for urgent issues Gabriel Tolbert [phone]     infectious diseases consult note:    ROBERT BELLA is a 89y y. o. Female patient     HPI:  89yo F with recurrent UTIs, atrial tachycardia, HTN, HLD, and hypothyroidism (not on meds anymore) presents to the ED with urinary frequency x1 day, associated with palpitations. Patient reports she started experiencing increased urinary frequency around midnight without associated dysuria or hematuria; patient states she felt she started getting anxious due to the increased urinary frequency and started feeling palpitations shortly after the urinary symptoms started; denies any associated chest pain/pressure, SOB, orthopnea, leg swelling. Patient endroses Hx of palpitations associated with anxiety in the past, but denies any Hx of Afib/atrial flutter .Per chart review, patient was hospitalized at [facility]  01/06/2024-01/13/2024 for palpitations; her home meds adjusted at that time. At the time, Cardizem, Toprol, and Clonidine were d/c'ed. Patient was started on Sotalol and Lisinopril. Outpatient, patient was started on hydralazine 10mg BID by Cardio Dr. Lugo; patient states she decreased her own dose to 10mg daily as she felt tired taking hydralzine BID; patient states Dr. Lugo aware of this. Patient states she had 3-4 UTIs last year, but reports no UTIs in 2025. Denies any recent ABx use.     Denies fever, chills, cough, abdominal pain, nausea, vomiting, diarrhea, constipation, headaches, changes in vision, dizziness, numbness, tingling.    Denies recent travel or sick contacts.    ED Course:   Vitals: BP: 180/128 HR: 120, Temp: 97.2, RR: 20, SpO2: 97% on RA  Labs:    WBCs 7.27, H/H 13.6/41.0, D-dimer 299, BUN/Cr 22/0.89, Na 137, K 4.1, Mg 2.3, Trop 8.9 --> 12.3, ProBNP 1536  UA: - LE, - nitrite, occasional bacteria   EKG: Premature Atrial Tachycardia   Received in the ED: S/p hydralazine 10mg PO x1, Lopressor 5mg IV x1, 500cc IV NS bolus x1  (28 Mar 2025 12:29)      PAST MEDICAL & SURGICAL HISTORY:  HTN - Hypertension      Hypercholesteremia      Atrial tachycardia      Hypothyroid      S/P Cataract Surgery  B/L      Liver cyst  removed          Allergies    penicillins (Rash)    Intolerances        ANTIBIOTICS/RELEVANT:  antimicrobials    immunologic:    OTHER:  acetaminophen     Tablet .. 650 milliGRAM(s) Oral every 6 hours PRN  enoxaparin Injectable 40 milliGRAM(s) SubCutaneous every 24 hours  hydrALAZINE 10 milliGRAM(s) Oral every 8 hours  lisinopril 20 milliGRAM(s) Oral two times a day  metoprolol tartrate 25 milliGRAM(s) Oral every 8 hours  polyethylene glycol 3350 17 Gram(s) Oral daily  senna 2 Tablet(s) Oral at bedtime    Social History:  Tobacco: former smoker, quit 70+ years ago, smoker for <5 yrs 0.5ppd   EtOH: denies  Recreational drug use: denies  Lives with: daughter at home   Ambulates: with a cane/walker   ADLs: independent (28 Mar 2025 12:29)        FAMILY HISTORY:  FH: HTN (hypertension) (Mother  FH: type 2 diabetes mellitus (Sibling)          ROS:    EYES:  Negative  blurry vision or double vision  GASTROINTESTINAL:  Negative for nausea, vomiting, diarrhea  -otherwise negative except for subjective    Objective:  Last 24-Vital Signs Last 24 Hrs  T(C): 36.2 (29 Mar 2025 08:00), Max: 37 (28 Mar 2025 17:30)  T(F): 97.2 (29 Mar 2025 08:00), Max: 98.6 (28 Mar 2025 17:30)  HR: 104 (29 Mar 2025 13:01) (86 - 104)  BP: 132/85 (29 Mar 2025 13:01) (119/75 - 162/97)  BP(mean): --  RR: 18 (29 Mar 2025 08:00) (16 - 18)  SpO2: 98% (29 Mar 2025 08:00) (94% - 98%)    Parameters below as of 29 Mar 2025 08:00  Patient On (Oxygen Delivery Method): room air        T(C): 36.2 (03-29-25 @ 08:00), Max: 37 (03-28-25 @ 17:30)  T(F): 97.2 (03-29-25 @ 08:00), Max: 98.6 (03-28-25 @ 17:30)  T(C): 36.2 (03-29-25 @ 08:00), Max: 37 (03-28-25 @ 17:30)  T(F): 97.2 (03-29-25 @ 08:00), Max: 98.6 (03-28-25 @ 17:30)  T(C): 36.2 (03-29-25 @ 08:00), Max: 37 (03-28-25 @ 17:30)  T(F): 97.2 (03-29-25 @ 08:00), Max: 98.6 (03-28-25 @ 17:30)    PHYSICAL EXAM:  Constitutional: NAD  Eyes: PERRLA, EOMI  Ear/Nose/Throat: oropharynx normal	  Neck: no JVD, no lymphadenopathy, supple  Respiratory: no accessory muscle use, lung fields bilaterally clear  Cardiovascular: distant  Gastrointestinal: soft, NT, no HSM, BS-normal  Extremities: no clubbing, no cyanosis, edema absent  Neuro: patient alert, oriented and appropriate  Skin: no sig lesions        LABS:                        12.7   8.21  )-----------( 190      ( 29 Mar 2025 06:55 )             38.3       WBC 8.21  03-29 @ 06:55  WBC 7.27  03-28 @ 05:35      03-29    136  |  105  |  20  ----------------------------<  88  4.5   |  24  |  0.81    Ca    9.0      29 Mar 2025 06:55  Phos  3.8     03-29  Mg     2.0     03-29    TPro  6.5  /  Alb  2.8[L]  /  TBili  0.7  /  DBili  x   /  AST  25  /  ALT  21  /  AlkPhos  71  03-29      Creatinine: 0.81 mg/dL (03-29-25 @ 06:55)  Creatinine: 0.89 mg/dL (03-28-25 @ 05:35)      PT/INR - ( 28 Mar 2025 05:35 )   PT: 11.2 sec;   INR: 0.95 ratio         PTT - ( 28 Mar 2025 05:35 )  PTT:32.3 sec  Urinalysis Basic - ( 29 Mar 2025 06:55 )    Color: x / Appearance: x / SG: x / pH: x  Gluc: 88 mg/dL / Ketone: x  / Bili: x / Urobili: x   Blood: x / Protein: x / Nitrite: x   Leuk Esterase: x / RBC: x / WBC x   Sq Epi: x / Non Sq Epi: x / Bacteria: x            MICROBIOLOGY:    Culture - Blood (03.28.25 @ 05:35)    -  Coagulase negative Staphylococcus: Detec   Gram Stain:   Growth in aerobic bottle: Gram Positive Cocci in Clusters  Growth in anaerobic bottle: Gram Positive Cocci in Clusters   Specimen Source: Blood Blood-Venous   Organism: Blood Culture PCR   Culture Results:   Growth in aerobic bottle: Gram Positive Cocci in Clusters  Growth in anaerobic bottle: Gram Positive Cocci in Clusters  Direct identification is available within approximately 3-5  hours either by Blood Panel Multiplexed PCR or Direct  MALDI-TOF. Details: [email or web address]   Organism Identification: Blood Culture PCR   Method Type: PCR        RADIOLOGY & ADDITIONAL STUDIES:

## 2025-03-29 NOTE — DISCHARGE NOTE PROVIDER - CARE PROVIDER_API CALL
Drea Lugo  Cardiology  [address] [place] [id number]  Phone: [phone]  Fax: [phone]  Established Patient  Follow Up Time: 1 week    Arturo Lira  Cardiac Electrophysiology  [address] [place] [id number]  Phone: [phone]  Fax: [phone]  Follow Up Time: 1 week   Drea Lugo  Cardiology  [id number] [place] [id number]  Phone: [phone]  Fax: [phone]  Established Patient  Follow Up Time: 1-3 days    Arturo Lira  Cardiac Electrophysiology  [address] [place] [id number]  Phone: [phone]  Fax: [phone]  Follow Up Time: 1 week

## 2025-03-29 NOTE — PROGRESS NOTE ADULT - PROBLEM SELECTOR PLAN 1
89yo F with PMHx of atrial tachycardia, HTN, HLD, and hypothyroidism (not on meds anymore) presents to the ED with urinary frequency x1 day, associated with palpitations  - No leukocytosis, patient afebrile on admission; does not meet sepsis criteria on admission   - EKG: Premature Atrial Tachycardia , QTc 501 on admission   - r/p EKG also atrial tach  - Previous TTE (01/2024) shows: Left ventricular systolic function is normal with an ejection fraction of 68 % by Hinton's method of disks. Mild mitral regurgitation. Mild aortic regurgitation.  - Trops neg x2  - UA negative for UTI; no recent ABx per patient and her daughter   - D-dimer 299 - age-adjusted d-dimer wnl   - S/p hydralazine 10mg PO x1, Lopressor 5mg IV x1, 500cc IV NS bolus x1 in the ED   - on sotalol at home --- start metoprolol 25 TID.  - TELE monitoring  - Cardio ([facility]) consulted, recs appreciated 89yo F with PMHx of atrial tachycardia, HTN, HLD, and hypothyroidism (not on meds anymore) presents to the ED with urinary frequency x1 day, associated with palpitations  - No leukocytosis, patient afebrile on admission; does not meet sepsis criteria on admission   - EKG: Premature Atrial Tachycardia , QTc 501 on admission   - r/p EKG also atrial tach  - Previous TTE (01/2024) shows: Left ventricular systolic function is normal with an ejection fraction of 68 % by Hinton's method of disks. Mild mitral regurgitation. Mild aortic regurgitation.  - Trops neg x2  - UA negative for UTI; no recent ABx per patient and her daughter   - D-dimer 299 - age-adjusted d-dimer wnl   - S/p hydralazine 10mg PO x1, Lopressor 5mg IV x1, 500cc IV NS bolus x1 in the ED   - on sotalol at home --- start metoprolol 25 TID.  - f/u TTE  - TELE monitoring  - Cardio ([facility]) consulted, recs appreciated

## 2025-03-29 NOTE — PROGRESS NOTE ADULT - SUBJECTIVE AND OBJECTIVE BOX
[facility] -- Hugo Goddard Pannella, Patel, Savella, Goodger, Cohen  Office # [phone]    Follow Up:  Palpitations    Subjective/Observations:     REVIEW OF SYSTEMS: All other review of systems is negative unless indicated above  PAST MEDICAL & SURGICAL HISTORY:  HTN - Hypertension      Hypercholesteremia      Atrial tachycardia      Hypothyroid      S/P Cataract Surgery  B/L      Liver cyst  removed        MEDICATIONS  (STANDING):  cefTRIAXone   IVPB 1000 milliGRAM(s) IV Intermittent every 24 hours  enoxaparin Injectable 40 milliGRAM(s) SubCutaneous every 24 hours  hydrALAZINE 10 milliGRAM(s) Oral daily  lisinopril 20 milliGRAM(s) Oral two times a day  polyethylene glycol 3350 17 Gram(s) Oral daily  senna 2 Tablet(s) Oral at bedtime    MEDICATIONS  (PRN):  acetaminophen     Tablet .. 650 milliGRAM(s) Oral every 6 hours PRN Temp greater or equal to 38C (100.4F), Mild Pain (1 - 3)    Allergies    penicillins (Rash)    Intolerances      Vital Signs Last 24 Hrs  T(C): 36.6 (29 Mar 2025 04:36), Max: 37 (28 Mar 2025 17:30)  T(F): 97.9 (29 Mar 2025 04:36), Max: 98.6 (28 Mar 2025 17:30)  HR: 94 (29 Mar 2025 04:36) (86 - 109)  BP: 162/97 (29 Mar 2025 04:36) (147/86 - 166/101)  BP(mean): --  RR: 16 (29 Mar 2025 04:36) (16 - 18)  SpO2: 97% (29 Mar 2025 04:36) (94% - 98%)    Parameters below as of 29 Mar 2025 04:36  Patient On (Oxygen Delivery Method): room air      I&O's Summary    28 Mar 2025 07:01  -  29 Mar 2025 06:46  --------------------------------------------------------  IN: 0 mL / OUT: 450 mL / NET: -450 mL          TELE: SR 90's   PHYSICAL EXAM:  Constitutional: NAD, awake and alert  HEENT: Moist Mucous Membranes, Anicteric  Pulmonary: Non-labored, breath sounds are clear bilaterally, No wheezing, rales or rhonchi  Cardiovascular:Regular, S1 and S2, No murmurs, rubs, gallops or clicks  Gastrointestinal: Bowel Sounds present, soft, nontender.   Lymph: No peripheral edema. No lymphadenopathy.  Skin: No visible rashes or ulcers.  Psych:  Mood & affect appropriate  LABS: All Labs Reviewed:                        13.6   7.27  )-----------( 217      ( 28 Mar 2025 05:35 )             41.0     28 Mar 2025 05:35    137    |  104    |  22     ----------------------------<  124    4.2     |  27     |  0.89     Ca    9.1        28 Mar 2025 05:35  Mg     2.3       28 Mar 2025 05:35    TPro  7.4    /  Alb  3.7    /  TBili  0.7    /  DBili  x      /  AST  25     /  ALT  27     /  AlkPhos  91     28 Mar 2025 05:35    PT/INR - ( 28 Mar 2025 05:35 )   PT: 11.2 sec;   INR: 0.95 ratio         PTT - ( 28 Mar 2025 05:35 )  PTT:32.3 sec      12 Lead ECG:   Ventricular Rate 103 BPM    Atrial Rate 103 BPM    P-R Interval 170 ms    QRS Duration 94 ms    Q-T Interval 360 ms    QTC Calculation(Bazett) 471 ms    R Axis -66 degrees    T Axis 28 degrees    Diagnosis Line Atrial tachycardia  Left anterior fascicular block  Minimal voltage criteria for LVH, may be normal variant ( Delano product )  Septal infarct (cited on or before 12-JAN-2024)  Abnormal ECG    Confirmed by natalia Joe ([id number]) on 3/28/2025 3:13:51 PM (03-28-25 @ 14:08)      TRANSTHORACIC ECHOCARDIOGRAM REPORT  ________________________________________________________________________________                                      _______       Pt. Name:       ROBERT BELLA Study Date:    1/11/2024  MRN:            [id number]       YOB: 1929  Accession #:    [id number]      Age:           89 years  Account#:       [id number]     Gender:        F  Heart Rate:                    Height:        59.06 in (150.00 cm)  Rhythm:                        Weight:   110.23 lb (50.00 kg)  Blood Pressure: 157/77 mmHg    BSA/BMI:       1.43 m² / 22.22 kg/m²  ________________________________________________________________________________________  Referring Physician:    Mahendra Greene  Interpreting Physician: Ap Arellano  Primary Sonographer:    Donal rOozco    CPT:               ECHO TTE WO CON COMP W DOPP - 83273.m  Indication(s):     Abnormal electrocardiogram ECG/EKG - R94.31  Procedure:         Transthoracic echocardiogram with 2-D, M-mode and complete                 spectral and color flow Doppler.  Ordering Location: [facility]  Admission Status:  Inpatient  Study Information: Image quality for this study is technically difficult.    _______________________________________________________________________________________     CONCLUSIONS:      1. Technically difficult image quality.   2. Left ventricular systolic function is normal with an ejection fraction of 68 % by Hinton's method of disks.   3. There is normal LV mass and concentric remodeling.   4. Normal right ventricular cavity size and normal systolic function.   5. The left atrium is normal.   6. Mitral valve leaflets have focal calcification.   7. Mild mitral regurgitation.   8. Trileaflet aortic valve with normal systolic excursion. There is focal calcification of the aortic valve leaflets.   9. Mild aortic regurgitation.  10. Estimated pulmonary artery systolic pressure is 35 mmHg, consistent with normal pulmonary artery pressure.  11. Echo-free space is noted in the liver consistent with cyst, however, dedicated imaging recommended for further evaluation if clinically indicated.    ________________________________________________________________________________________  FINDINGS:     Left Ventricle:  Left ventricular systolic function is normal with a calculated ejection fraction of 68 % by the Hinton's biplane method of disks. There is normal LV mass and concentric remodeling.     Right Ventricle:  The right ventricular cavity is normal in size and normal systolic function. Tricuspid annular plane systolic excursion (TAPSE) is 2.4 cm (normal >=1.7 cm).     Left Atrium:  The left atrium is normal with an indexed volume of 21.08 ml/m².     Right Atrium:  The right atrium is normal in size.     Aortic Valve:  The aortic valve appears trileaflet with normal systolic excursion. There is focal calcification of the aortic valve leaflets. There is mild aortic regurgitation.     Mitral Valve:  Mitral valve leaflets have focal calcification. There is mild mitral regurgitation.     Tricuspid Valve:  Structurally normal tricuspid valve with normal leaflet excursion. There is mild to moderate tricuspid regurgitation. Estimated pulmonary artery systolic pressure is 35 mmHg, consistent with normal pulmonary artery pressure.     Pulmonic Valve:  The pulmonic valve was not well visualized. There is mild to moderate pulmonic regurgitation.     Aorta:  The aortic root at the sinuses of Valsalva is normal in size, measuring 2.50 cm (indexed 1.75 cm/m²). The ascending aortadiameter is normal in size, measuring 2.30 cm (indexed 1.61 cm/m²).     Extra-cardiac Findings:  Echo-free space is noted in the liver consistent with cyst, however, dedicated imaging recommended for further evaluation if clinically indicated.     Systemic Veins:  The inferior vena cava is normal in size measuring 0.99 cm in diameter, (normal <2.1cm) with normal inspiratory collapse (normal >50%) consistent with normal right atrial pressure (~3, range 0-5mmHg).  ____________________________________________________________________  QUANTITATIVE DATA:  Left Ventricle Measurements: (Indexed to BSA)     IVSd (2D):   1.0 cm  LVPWd (2D):  0.9 cm  LVIDd (2D):  3.5 cm  LVIDs (2D):  2.1 cm  LV Mass:     93 g   64.6 g/m²  LV Vol d, MOD A2C: 31.9 ml 22.27 ml/m²  LV Vol d, MOD A4C: 43.4 ml 30.30 ml/m²  LV Vol d, MOD BP:  37.7 ml 26.29 ml/m²  LV Vol s, MOD A2C: 10.1 ml 7.05 ml/m²  LV Vol s, MOD A4C: 14.6 ml 10.19 ml/m²  LV Vol s, MOD BP:  11.9 ml 8.29 ml/m²  LVOT SV MOD BP:    25.8 ml  LV EF% MOD BP:   68 %     MV E Vmax:    1.00 m/s  MV A Vmax:    1.28 m/s  MV E/A:       0.78  e' lateral:   22.40 cm/s  e' medial:    14.60 cm/s  E/e' lateral: 4.45  E/e' medial:  6.82  E/e' Average: 5.38  MV DT:        278 msec    Aorta Measurements: (normal range) (Indexed to BSA)     Sinuses of Valsalva: 2.50 cm (2.7 - 3.3 cm)  Ao Asc prox:         2.30 cm       Left Atrium Measurements: (Indexed to BSA)  LA Diam 2D: 3.30 cm    Right Ventricle Measurements:     TAPSE:            2.4 cm  RV Base (RVID1):  2.7 cm  RV Mid (RVID2):   2.7 cm  RV Major (RVID3): 6.2 cm       LVOT / RVOT/ Qp/Qs Data: (Indexed to BSA)  LVOT Diameter: 1.50 cm    Mitral Valve Measurements:     MV E Vmax: 1.0 m/s  MV A Vmax: 1.3 m/s  MV E/A:    0.8       Tricuspid Valve Measurements:     TR Vmax:          2.8 m/s  TR Peak Gradient: 31.8 mmHg  RA Pressure:      3 mmHg  PASP:             35 mmHg    ________________________________________________________________________________________  Electronically signed on 1/11/2024 at 5:21:08 PM by Ap Arellano         *** Final ***      [facility] -- Hugo Goddard Pannella, Patel, Savella, Goodger, Cohen  Office # [phone]    Follow Up:  Palpitations    Subjective/Observations: seen and examined, awake, alert, resting comfortably in bed, denies chest pain, dyspnea. Tolerating room air. ABX infusing     REVIEW OF SYSTEMS: All other review of systems is negative unless indicated above  PAST MEDICAL & SURGICAL HISTORY:  HTN - Hypertension      Hypercholesteremia      Atrial tachycardia      Hypothyroid      S/P Cataract Surgery  B/L      Liver cyst  removed        MEDICATIONS  (STANDING):  cefTRIAXone   IVPB 1000 milliGRAM(s) IV Intermittent every 24 hours  enoxaparin Injectable 40 milliGRAM(s) SubCutaneous every 24 hours  hydrALAZINE 10 milliGRAM(s) Oral daily  lisinopril 20 milliGRAM(s) Oral two times a day  polyethylene glycol 3350 17 Gram(s) Oral daily  senna 2 Tablet(s) Oral at bedtime    MEDICATIONS  (PRN):  acetaminophen     Tablet .. 650 milliGRAM(s) Oral every 6 hours PRN Temp greater or equal to 38C (100.4F), Mild Pain (1 - 3)    Allergies    penicillins (Rash)    Intolerances      Vital Signs Last 24 Hrs  T(C): 36.6 (29 Mar 2025 04:36), Max: 37 (28 Mar 2025 17:30)  T(F): 97.9 (29 Mar 2025 04:36), Max: 98.6 (28 Mar 2025 17:30)  HR: 94 (29 Mar 2025 04:36) (86 - 109)  BP: 162/97 (29 Mar 2025 04:36) (147/86 - 166/101)  BP(mean): --  RR: 16 (29 Mar 2025 04:36) (16 - 18)  SpO2: 97% (29 Mar 2025 04:36) (94% - 98%)    Parameters below as of 29 Mar 2025 04:36  Patient On (Oxygen Delivery Method): room air      I&O's Summary    28 Mar 2025 07:01  -  29 Mar 2025 06:46  --------------------------------------------------------  IN: 0 mL / OUT: 450 mL / NET: -450 mL          TELE: SR vs Atach 90's - 100's   PHYSICAL EXAM:  Constitutional: NAD, awake and alert  HEENT: Moist Mucous Membranes, Anicteric  Pulmonary: Non-labored, breath sounds are clear bilaterally, No wheezing, rales or rhonchi  Cardiovascular: Regular, S1 and S2, No murmurs, rubs, gallops or clicks  Gastrointestinal: Bowel Sounds present, soft, nontender.   Lymph: No peripheral edema. No lymphadenopathy.  Skin: No visible rashes or ulcers.  Psych:  Mood & affect appropriate  LABS: All Labs Reviewed:                        13.6   7.27  )-----------( 217      ( 28 Mar 2025 05:35 )             41.0     28 Mar 2025 05:35    137    |  104    |  22     ----------------------------<  124    4.2     |  27     |  0.89     Ca    9.1        28 Mar 2025 05:35  Mg     2.3       28 Mar 2025 05:35    TPro  7.4    /  Alb  3.7    /  TBili  0.7    /  DBili  x      /  AST  25     /  ALT  27     /  AlkPhos  91     28 Mar 2025 05:35    PT/INR - ( 28 Mar 2025 05:35 )   PT: 11.2 sec;   INR: 0.95 ratio         PTT - ( 28 Mar 2025 05:35 )  PTT:32.3 sec      12 Lead ECG:   Ventricular Rate 103 BPM    Atrial Rate 103 BPM    P-R Interval 170 ms    QRS Duration 94 ms    Q-T Interval 360 ms    QTC Calculation(Bazett) 471 ms    R Axis -66 degrees    T Axis 28 degrees    Diagnosis Line Atrial tachycardia  Left anterior fascicular block  Minimal voltage criteria for LVH, may be normal variant ( Delano product )  Septal infarct (cited on or before 12-JAN-2024)  Abnormal ECG    Confirmed by natalia Joe ([id number]) on 3/28/2025 3:13:51 PM (03-28-25 @ 14:08)      TRANSTHORACIC ECHOCARDIOGRAM REPORT  ________________________________________________________________________________                                      _______       Pt. Name:       ROBERT BELLA Study Date:    1/11/2024  MRN:            [id number]       YOB: 1929  Accession #:    [id number]      Age:           89 years  Account#:       [id number]     Gender:        F  Heart Rate:                    Height:        59.06 in (150.00 cm)  Rhythm:                        Weight:   110.23 lb (50.00 kg)  Blood Pressure: 157/77 mmHg    BSA/BMI:       1.43 m² / 22.22 kg/m²  ________________________________________________________________________________________  Referring Physician:    Mahendra Greene  Interpreting Physician: Ap Arellano  Primary Sonographer:    Donal Orozco    CPT:               ECHO TTE WO CON COMP W DOPP - 74332.m  Indication(s):     Abnormal electrocardiogram ECG/EKG - R94.31  Procedure:         Transthoracic echocardiogram with 2-D, M-mode and complete                 spectral and color flow Doppler.  Ordering Location: [facility]  Admission Status:  Inpatient  Study Information: Image quality for this study is technically difficult.    _______________________________________________________________________________________     CONCLUSIONS:      1. Technically difficult image quality.   2. Left ventricular systolic function is normal with an ejection fraction of 68 % by Hinton's method of disks.   3. There is normal LV mass and concentric remodeling.   4. Normal right ventricular cavity size and normal systolic function.   5. The left atrium is normal.   6. Mitral valve leaflets have focal calcification.   7. Mild mitral regurgitation.   8. Trileaflet aortic valve with normal systolic excursion. There is focal calcification of the aortic valve leaflets.   9. Mild aortic regurgitation.  10. Estimated pulmonary artery systolic pressure is 35 mmHg, consistent with normal pulmonary artery pressure.  11. Echo-free space is noted in the liver consistent with cyst, however, dedicated imaging recommended for further evaluation if clinically indicated.    ________________________________________________________________________________________  FINDINGS:     Left Ventricle:  Left ventricular systolic function is normal with a calculated ejection fraction of 68 % by the Hinton's biplane method of disks. There is normal LV mass and concentric remodeling.     Right Ventricle:  The right ventricular cavity is normal in size and normal systolic function. Tricuspid annular plane systolic excursion (TAPSE) is 2.4 cm (normal >=1.7 cm).     Left Atrium:  The left atrium is normal with an indexed volume of 21.08 ml/m².     Right Atrium:  The right atrium is normal in size.     Aortic Valve:  The aortic valve appears trileaflet with normal systolic excursion. There is focal calcification of the aortic valve leaflets. There is mild aortic regurgitation.     Mitral Valve:  Mitral valve leaflets have focal calcification. There is mild mitral regurgitation.     Tricuspid Valve:  Structurally normal tricuspid valve with normal leaflet excursion. There is mild to moderate tricuspid regurgitation. Estimated pulmonary artery systolic pressure is 35 mmHg, consistent with normal pulmonary artery pressure.     Pulmonic Valve:  The pulmonic valve was not well visualized. There is mild to moderate pulmonic regurgitation.     Aorta:  The aortic root at the sinuses of Valsalva is normal in size, measuring 2.50 cm (indexed 1.75 cm/m²). The ascending aortadiameter is normal in size, measuring 2.30 cm (indexed 1.61 cm/m²).     Extra-cardiac Findings:  Echo-free space is noted in the liver consistent with cyst, however, dedicated imaging recommended for further evaluation if clinically indicated.     Systemic Veins:  The inferior vena cava is normal in size measuring 0.99 cm in diameter, (normal <2.1cm) with normal inspiratory collapse (normal >50%) consistent with normal right atrial pressure (~3, range 0-5mmHg).  ____________________________________________________________________  QUANTITATIVE DATA:  Left Ventricle Measurements: (Indexed to BSA)     IVSd (2D):   1.0 cm  LVPWd (2D):  0.9 cm  LVIDd (2D):  3.5 cm  LVIDs (2D):  2.1 cm  LV Mass:     93 g   64.6 g/m²  LV Vol d, MOD A2C: 31.9 ml 22.27 ml/m²  LV Vol d, MOD A4C: 43.4 ml 30.30 ml/m²  LV Vol d, MOD BP:  37.7 ml 26.29 ml/m²  LV Vol s, MOD A2C: 10.1 ml 7.05 ml/m²  LV Vol s, MOD A4C: 14.6 ml 10.19 ml/m²  LV Vol s, MOD BP:  11.9 ml 8.29 ml/m²  LVOT SV MOD BP:    25.8 ml  LV EF% MOD BP:   68 %     MV E Vmax:    1.00 m/s  MV A Vmax:    1.28 m/s  MV E/A:       0.78  e' lateral:   22.40 cm/s  e' medial:    14.60 cm/s  E/e' lateral: 4.45  E/e' medial:  6.82  E/e' Average: 5.38  MV DT:        278 msec    Aorta Measurements: (normal range) (Indexed to BSA)     Sinuses of Valsalva: 2.50 cm (2.7 - 3.3 cm)  Ao Asc prox:         2.30 cm       Left Atrium Measurements: (Indexed to BSA)  LA Diam 2D: 3.30 cm    Right Ventricle Measurements:     TAPSE:            2.4 cm  RV Base (RVID1):  2.7 cm  RV Mid (RVID2):   2.7 cm  RV Major (RVID3): 6.2 cm       LVOT / RVOT/ Qp/Qs Data: (Indexed to BSA)  LVOT Diameter: 1.50 cm    Mitral Valve Measurements:     MV E Vmax: 1.0 m/s  MV A Vmax: 1.3 m/s  MV E/A:    0.8       Tricuspid Valve Measurements:     TR Vmax:          2.8 m/s  TR Peak Gradient: 31.8 mmHg  RA Pressure:      3 mmHg  PASP:             35 mmHg    ________________________________________________________________________________________  Electronically signed on 1/11/2024 at 5:21:08 PM by Ap Arellano         *** Final ***

## 2025-03-29 NOTE — PROGRESS NOTE ADULT - ASSESSMENT
89yo F with PMHx of atrial tachycardia, HTN, HLD, and hypothyroidism (not on meds anymore) presents to the ED with urinary frequency x1 day, associated with palpitations. Patient reports she started experiencing increased urinary frequency last night around midnight without associated dysuria or hematuria; patient states she felt she started getting anxious due to the increased urinary frequency and started feeling palpitations. Admitted for atrial tachycardia.

## 2025-03-29 NOTE — PROGRESS NOTE ADULT - ATTENDING COMMENTS
Patient was seen and examined by myself. Case was discussed with house staff in details. I have reviewed and agree with the plan as outlined above with edits where appropriate.    HPI:  89yo F with PMHx of atrial tachycardia, HTN, HLD, and hypothyroidism (not on meds anymore) presents to the ED with urinary frequency x1 day, associated with palpitations. Patient reports she started experiencing increased urinary frequency last night around midnight without associated dysuria or hematuria; patient states she felt she started getting anxious due to the increased urinary frequency and started feeling palpitations shortly after the urinary symptoms started; denies any associated chest pain/pressure, SOB, orthopnea, leg swelling. Patient endroses Hx of palpitations associated with anxiety in the past, but denies any Hx of Afib/atrial flutter .Per chart review, patient was hospitalized at [facility]  01/06/2024-01/13/2024 for palpitations; her home meds adjusted at that time. At the time, Cardizem, Toprol, and Clonidine were d/c'ed. Patient was started on Sotalol and Lisinopril. Outpatient, patient was started on hydralazine 10mg BID by Cardio Dr. Lugo; patient states she decreased her own dose to 10mg daily as she felt tired taking hydralzine BID; patient states Dr. Lugo aware of this. Patient states she had 3-4 UTIs last year, but reports no UTIs in 2025. Denies any recent ABx use.     Denies fever, chills, cough, abdominal pain, nausea, vomiting, diarrhea, constipation, headaches, changes in vision, dizziness, numbness, tingling.    Denies recent travel or sick contacts.    ED Course:   Vitals: BP: 180/128 HR: 120, Temp: 97.2, RR: 20, SpO2: 97% on RA  Labs:    WBCs 7.27, H/H 13.6/41.0, D-dimer 299, BUN/Cr 22/0.89, Na 137, K 4.1, Mg 2.3, Trop 8.9 --> 12.3, ProBNP 1536  UA: - LE, - nitrite, occasional bacteria   EKG: Premature Atrial Tachycardia   Received in the ED: S/p hydralazine 10mg PO x1, Lopressor 5mg IV x1, 500cc IV NS bolus x1  (28 Mar 2025 12:29)      ROS: as in the HPI; all other ROS negative    SH and family history as above    Vital Signs Last 24 Hrs  T(C): 36.2 (29 Mar 2025 08:00), Max: 37 (28 Mar 2025 17:30)  T(F): 97.2 (29 Mar 2025 08:00), Max: 98.6 (28 Mar 2025 17:30)  HR: 104 (29 Mar 2025 13:01) (86 - 104)  BP: 132/85 (29 Mar 2025 13:01) (119/75 - 162/97)  BP(mean): --  RR: 18 (29 Mar 2025 08:00) (16 - 18)  SpO2: 98% (29 Mar 2025 08:00) (94% - 98%)    Parameters below as of 29 Mar 2025 08:00  Patient On (Oxygen Delivery Method): room air        GEN: NAD  HEENT- normocephalic; mouth moist  CVS- S1S2+  LUNGS- clear to auscultation; no wheezing  ABD: Soft , nontender, nondistended, Bowel sounds are present  EXTREMITY: no calf tenderness, no cyanosis, no edema  NEURO: AAOx3; non focal neurologic exam; grossly non focal neuro exam  PSYCH: normal affect and behavior  BACK: no swelling or mass;   VASCULAR: distal peripheral pulses present  SKIN: warm and dry.       Labs and imaging reviewed      Patient presenting with Patient is a 89y old  Female who presents with a chief complaint of Atrial tachycardia (29 Mar 2025 09:12)   admitted for  1. palpitation: found to have Atrial tachycardia: EKG and telemetry suggest an atrial tachycardia at around 118 bpm, 120 manual count upon physical examination.  H/O atrial tachycardia, was  on sotalol. cardio eval noted, will check Echo, c/w Tele monitor. c/w metoprolol. check TSH. denies anxiety. oral intake normal.     2. HTN :  on lisinopril, metoprolol, hydralazine: will monitor BP /HR. may need to re consider hydralazine due to tachycardia/palpitation side affect.     3.  + blood culture: CoNS. will c/w rocephin, follow up repeat Blood culture , ID eval     4. urinary frequency: Ua negative, will follow Urine culture.         Plan of care discussed with patient ;  all questions and concerns were addressed.

## 2025-03-29 NOTE — PROGRESS NOTE ADULT - SUBJECTIVE AND OBJECTIVE BOX
Patient is a 89y old  Female who presents with a chief complaint of Atrial tachycardia (29 Mar 2025 06:45)      TELE: SR in the 60-100s HR overnight. This morning ST in the 100s.    INTERVAL HPI/OVERNIGHT EVENTS: Overnight, Blood culture showed growth in aerobic bottle: gram positive cocci in clusters. s/p rocephin x1. BCx x2 ordered. Pt seen and examined at the bedside this AM. Patient denies any acute complaints at this time. Patient states no chest pain, sob, abd pain, urinary sx.     MEDICATIONS  (STANDING):  enoxaparin Injectable 40 milliGRAM(s) SubCutaneous every 24 hours  hydrALAZINE 10 milliGRAM(s) Oral every 8 hours  lisinopril 20 milliGRAM(s) Oral two times a day  metoprolol tartrate 25 milliGRAM(s) Oral every 8 hours  polyethylene glycol 3350 17 Gram(s) Oral daily  senna 2 Tablet(s) Oral at bedtime    MEDICATIONS  (PRN):  acetaminophen     Tablet .. 650 milliGRAM(s) Oral every 6 hours PRN Temp greater or equal to 38C (100.4F), Mild Pain (1 - 3)      Allergies    penicillins (Rash)    Intolerances        REVIEW OF SYSTEMS:  per above HPI     Vital Signs Last 24 Hrs  T(C): 36.6 (29 Mar 2025 04:36), Max: 37 (28 Mar 2025 17:30)  T(F): 97.9 (29 Mar 2025 04:36), Max: 98.6 (28 Mar 2025 17:30)  HR: 101 (29 Mar 2025 08:00) (86 - 102)  BP: 119/75 (29 Mar 2025 08:00) (119/75 - 162/97)  BP(mean): --  RR: 16 (29 Mar 2025 04:36) (16 - 17)  SpO2: 97% (29 Mar 2025 04:36) (94% - 98%)    Parameters below as of 29 Mar 2025 04:36  Patient On (Oxygen Delivery Method): room air        PHYSICAL EXAM:  GENERAL: NAD  HEENT:  anicteric, moist mucous membranes  CHEST/LUNG:  CTA b/l, no rales, wheezes, or rhonchi  HEART:  +tachycardic, regular rhythm, S1, S2  ABDOMEN:  BS+, soft, nontender, nondistended  EXTREMITIES: mild nonpitting edema, no cyanosis, or calf tenderness  NERVOUS SYSTEM: answers questions and follows commands appropriately    LABS:                        12.7   8.21  )-----------( 190      ( 29 Mar 2025 06:55 )             38.3     CBC Full  -  ( 29 Mar 2025 06:55 )  WBC Count : 8.21 K/uL  Hemoglobin : 12.7 g/dL  Hematocrit : 38.3 %  Platelet Count - Automated : 190 K/uL  Mean Cell Volume : 89.3 fl  Mean Cell Hemoglobin : 29.6 pg  Mean Cell Hemoglobin Concentration : 33.2 g/dL  Auto Neutrophil # : x  Auto Lymphocyte # : x  Auto Monocyte # : x  Auto Eosinophil # : x  Auto Basophil # : x  Auto Neutrophil % : x  Auto Lymphocyte % : x  Auto Monocyte % : x  Auto Eosinophil % : x  Auto Basophil % : x    29 Mar 2025 06:55    136    |  105    |  20     ----------------------------<  88     4.5     |  24     |  0.81     Ca    9.0        29 Mar 2025 06:55  Phos  3.8       29 Mar 2025 06:55  Mg     2.0       29 Mar 2025 06:55    TPro  6.5    /  Alb  2.8    /  TBili  0.7    /  DBili  x      /  AST  25     /  ALT  21     /  AlkPhos  71     29 Mar 2025 06:55    PT/INR - ( 28 Mar 2025 05:35 )   PT: 11.2 sec;   INR: 0.95 ratio         PTT - ( 28 Mar 2025 05:35 )  PTT:32.3 sec  Urinalysis Basic - ( 29 Mar 2025 06:55 )    Color: x / Appearance: x / SG: x / pH: x  Gluc: 88 mg/dL / Ketone: x  / Bili: x / Urobili: x   Blood: x / Protein: x / Nitrite: x   Leuk Esterase: x / RBC: x / WBC x   Sq Epi: x / Non Sq Epi: x / Bacteria: x      CAPILLARY BLOOD GLUCOSE            Urinalysis with Rflx Culture (collected 03-28-25 @ 06:25)    Culture - Blood (collected 03-28-25 @ 05:35)  Source: Blood Blood-Venous  Gram Stain (03-29-25 @ 03:12):    Growth in aerobic bottle: Gram Positive Cocci in Clusters  Preliminary Report (03-29-25 @ 03:13):    Growth in aerobic bottle: Gram Positive Cocci in Clusters    Direct identification is available within approximately 3-5    hours either by Blood Panel Multiplexed PCR or Direct    MALDI-TOF. Details: [email or web address]  Organism: Blood Culture PCR (03-29-25 @ 04:27)  Organism: Blood Culture PCR (03-29-25 @ 04:27)      Method Type: PCR      -  Coagulase negative Staphylococcus: Detec    Culture - Blood (collected 03-28-25 @ 05:25)  Source: Blood Blood-Peripheral  Gram Stain (03-29-25 @ 04:36):    Growth in anaerobic bottle: Gram Positive Cocci in Clusters  Preliminary Report (03-29-25 @ 04:36):    Growth in anaerobic bottle: Gram Positive Cocci in Clusters        RADIOLOGY & ADDITIONAL TESTS:  Personally reviewed.     Consultant(s) Notes Reviewed:  [x] YES  [ ] NO

## 2025-03-29 NOTE — PROVIDER CONTACT NOTE (CRITICAL VALUE NOTIFICATION) - SITUATION
Preliminay report from blood culture draw on 3/28 shows  Aerobic bottle Gram + growth in cocci and clusters.

## 2025-03-29 NOTE — DISCHARGE NOTE PROVIDER - HOSPITAL COURSE
FROM ADMISSION H+P:   HPI:  89yo F with PMHx of atrial tachycardia, HTN, HLD, and hypothyroidism (not on meds anymore) presents to the ED with urinary frequency x1 day, associated with palpitations. Patient reports she started experiencing increased urinary frequency last night around midnight without associated dysuria or hematuria; patient states she felt she started getting anxious due to the increased urinary frequency and started feeling palpitations shortly after the urinary symptoms started; denies any associated chest pain/pressure, SOB, orthopnea, leg swelling. Patient endroses Hx of palpitations associated with anxiety in the past, but denies any Hx of Afib/atrial flutter .Per chart review, patient was hospitalized at [facility]  01/06/2024-01/13/2024 for palpitations; her home meds adjusted at that time. At the time, Cardizem, Toprol, and Clonidine were d/c'ed. Patient was started on Sotalol and Lisinopril. Outpatient, patient was started on hydralazine 10mg BID by Cardio Dr. Lugo; patient states she decreased her own dose to 10mg daily as she felt tired taking hydralzine BID; patient states Dr. Lugo aware of this. Patient states she had 3-4 UTIs last year, but reports no UTIs in 2025. Denies any recent ABx use.     Denies fever, chills, cough, abdominal pain, nausea, vomiting, diarrhea, constipation, headaches, changes in vision, dizziness, numbness, tingling.    Denies recent travel or sick contacts.    ED Course:   Vitals: BP: 180/128 HR: 120, Temp: 97.2, RR: 20, SpO2: 97% on RA  Labs:    WBCs 7.27, H/H 13.6/41.0, D-dimer 299, BUN/Cr 22/0.89, Na 137, K 4.1, Mg 2.3, Trop 8.9 --> 12.3, ProBNP 1536  UA: - LE, - nitrite, occasional bacteria   EKG: Premature Atrial Tachycardia   Received in the ED: S/p hydralazine 10mg PO x1, Lopressor 5mg IV x1, 500cc IV NS bolus x1  (28 Mar 2025 12:29)      ---  HOSPITAL COURSE:   Patient was admitted for atrial tachycardia.      ---  CONSULTANTS:     ---  TIME SPENT:  I, the attending physician, was physically present for the key portions of the evaluation and management (E/M) service provided. The total amount of time spent reviewing the hospital notes, laboratory values, imaging findings, assessing/counseling the patient, discussing with consultant physicians, social work, nursing staff was -- minutes    ---  Primary care provider was made aware of plan for discharge:      [  ] NO     [  ] YES   FROM ADMISSION H+P:   HPI:  89yo F with PMHx of atrial tachycardia, HTN, HLD, and hypothyroidism (not on meds anymore) presents to the ED with urinary frequency x1 day, associated with palpitations. Patient reports she started experiencing increased urinary frequency last night around midnight without associated dysuria or hematuria; patient states she felt she started getting anxious due to the increased urinary frequency and started feeling palpitations shortly after the urinary symptoms started; denies any associated chest pain/pressure, SOB, orthopnea, leg swelling. Patient endroses Hx of palpitations associated with anxiety in the past, but denies any Hx of Afib/atrial flutter .Per chart review, patient was hospitalized at [facility]  01/06/2024-01/13/2024 for palpitations; her home meds adjusted at that time. At the time, Cardizem, Toprol, and Clonidine were d/c'ed. Patient was started on Sotalol and Lisinopril. Outpatient, patient was started on hydralazine 10mg BID by Cardio Dr. Lugo; patient states she decreased her own dose to 10mg daily as she felt tired taking hydralzine BID; patient states Dr. Lugo aware of this. Patient states she had 3-4 UTIs last year, but reports no UTIs in 2025. Denies any recent ABx use.     Denies fever, chills, cough, abdominal pain, nausea, vomiting, diarrhea, constipation, headaches, changes in vision, dizziness, numbness, tingling.    Denies recent travel or sick contacts.    ED Course:   Vitals: BP: 180/128 HR: 120, Temp: 97.2, RR: 20, SpO2: 97% on RA  Labs:    WBCs 7.27, H/H 13.6/41.0, D-dimer 299, BUN/Cr 22/0.89, Na 137, K 4.1, Mg 2.3, Trop 8.9 --> 12.3, ProBNP 1536  UA: - LE, - nitrite, occasional bacteria   EKG: Premature Atrial Tachycardia   Received in the ED: S/p hydralazine 10mg PO x1, Lopressor 5mg IV x1, 500cc IV NS bolus x1  (28 Mar 2025 12:29)      ---  HOSPITAL COURSE:   Patient was admitted for atrial tachycardia. Troponins negative. Pro-BNP mildly elevated without signs of volume overload. Patient was started on Metoprolol 25 mg TID for control of atrial tachycardia. Repeat TTE shows ***. CTA of the chest showed ***. Metoprolol dose increased to 50 mg TID for continued tachycardia overnight on 3/30. Ep was consulted and showed***. Patient was placed on Home lisinopril and hydralazine was switched to amlodipine for control of her HTN.     Patient had positive blood cultures showing gram positive cocci in clusters Staphylococcus Hominis. Patient was started on IV rocephin. Repeat Blood cultures showed ***.      ---  CONSULTANTS:     ---  TIME SPENT:  I, the attending physician, was physically present for the key portions of the evaluation and management (E/M) service provided. The total amount of time spent reviewing the hospital notes, laboratory values, imaging findings, assessing/counseling the patient, discussing with consultant physicians, social work, nursing staff was -- minutes    ---  Primary care provider was made aware of plan for discharge:      [  ] NO     [  ] YES   FROM ADMISSION H+P:   HPI:  89yo F with PMHx of atrial tachycardia, HTN, HLD, and hypothyroidism (not on meds anymore) presents to the ED with urinary frequency x1 day, associated with palpitations. Patient reports she started experiencing increased urinary frequency last night around midnight without associated dysuria or hematuria; patient states she felt she started getting anxious due to the increased urinary frequency and started feeling palpitations shortly after the urinary symptoms started; denies any associated chest pain/pressure, SOB, orthopnea, leg swelling. Patient endroses Hx of palpitations associated with anxiety in the past, but denies any Hx of Afib/atrial flutter .Per chart review, patient was hospitalized at [facility]  01/06/2024-01/13/2024 for palpitations; her home meds adjusted at that time. At the time, Cardizem, Toprol, and Clonidine were d/c'ed. Patient was started on Sotalol and Lisinopril. Outpatient, patient was started on hydralazine 10mg BID by Cardio Dr. Lugo; patient states she decreased her own dose to 10mg daily as she felt tired taking hydralzine BID; patient states Dr. Lugo aware of this. Patient states she had 3-4 UTIs last year, but reports no UTIs in 2025. Denies any recent ABx use.     Denies fever, chills, cough, abdominal pain, nausea, vomiting, diarrhea, constipation, headaches, changes in vision, dizziness, numbness, tingling.    Denies recent travel or sick contacts.    ED Course:   Vitals: BP: 180/128 HR: 120, Temp: 97.2, RR: 20, SpO2: 97% on RA  Labs:    WBCs 7.27, H/H 13.6/41.0, D-dimer 299, BUN/Cr 22/0.89, Na 137, K 4.1, Mg 2.3, Trop 8.9 --> 12.3, ProBNP 1536  UA: - LE, - nitrite, occasional bacteria   EKG: Premature Atrial Tachycardia   Received in the ED: S/p hydralazine 10mg PO x1, Lopressor 5mg IV x1, 500cc IV NS bolus x1  (28 Mar 2025 12:29)      ---  HOSPITAL COURSE:   Patient was admitted for atrial tachycardia. Troponins negative. Pro-BNP mildly elevated without signs of volume overload. Patient was started on Metoprolol 25 mg TID for control of atrial tachycardia. Repeat TTE shows Echocardiogram shows normal LV systolic function with EF 68% without other significant abnormalities/changes. CTA of the chest showed No pulmonary embolism. Metoprolol dose increased to 50 mg TID for continued tachycardia overnight on 3/30. Ep was consulted and added Flecainide 50 mg BID to her medication regimen 3/31. HR ***** improved vs not. Patient was placed on Home lisinopril and hydralazine was switched to amlodipine for control of her HTN.     Patient had positive blood cultures showing gram positive cocci in clusters Staphylococcus Hominis. Patient was started on IV rocephin. Repeat Blood cultures showed No growth. Patient was watche doff of antibiotics and stayed afebrile without any evidence of acute infection.      ---  CONSULTANTS:   - Cardiology: Dr. Joe  - ID: Dr. Tolbert  - EP:      ---  TIME SPENT:  I, the attending physician, was physically present for the key portions of the evaluation and management (E/M) service provided. The total amount of time spent reviewing the hospital notes, laboratory values, imaging findings, assessing/counseling the patient, discussing with consultant physicians, social work, nursing staff was -- minutes    ---  Primary care provider was made aware of plan for discharge:      [  ] NO     [  ] YES   FROM ADMISSION H+P:   HPI:  89yo F with PMHx of atrial tachycardia, HTN, HLD, and hypothyroidism (not on meds anymore) presents to the ED with urinary frequency x1 day, associated with palpitations. Patient reports she started experiencing increased urinary frequency last night around midnight without associated dysuria or hematuria; patient states she felt she started getting anxious due to the increased urinary frequency and started feeling palpitations shortly after the urinary symptoms started; denies any associated chest pain/pressure, SOB, orthopnea, leg swelling. Patient endroses Hx of palpitations associated with anxiety in the past, but denies any Hx of Afib/atrial flutter .Per chart review, patient was hospitalized at [facility]  01/06/2024-01/13/2024 for palpitations; her home meds adjusted at that time. At the time, Cardizem, Toprol, and Clonidine were d/c'ed. Patient was started on Sotalol and Lisinopril. Outpatient, patient was started on hydralazine 10mg BID by Cardio Dr. Lugo; patient states she decreased her own dose to 10mg daily as she felt tired taking hydralzine BID; patient states Dr. Lugo aware of this. Patient states she had 3-4 UTIs last year, but reports no UTIs in 2025. Denies any recent ABx use.     Denies fever, chills, cough, abdominal pain, nausea, vomiting, diarrhea, constipation, headaches, changes in vision, dizziness, numbness, tingling.    Denies recent travel or sick contacts.    ED Course:   Vitals: BP: 180/128 HR: 120, Temp: 97.2, RR: 20, SpO2: 97% on RA  Labs:    WBCs 7.27, H/H 13.6/41.0, D-dimer 299, BUN/Cr 22/0.89, Na 137, K 4.1, Mg 2.3, Trop 8.9 --> 12.3, ProBNP 1536  UA: - LE, - nitrite, occasional bacteria   EKG: Premature Atrial Tachycardia   Received in the ED: S/p hydralazine 10mg PO x1, Lopressor 5mg IV x1, 500cc IV NS bolus x1  (28 Mar 2025 12:29)      ---  HOSPITAL COURSE:   Patient was admitted for atrial tachycardia. Troponins negative. Pro-BNP mildly elevated without signs of volume overload. Patient was started on Metoprolol 25 mg TID for control of atrial tachycardia. Repeat TTE shows Echocardiogram shows normal LV systolic function with EF 68% without other significant abnormalities/changes. CTA of the chest showed No pulmonary embolism. Metoprolol dose increased to 50 mg TID for continued tachycardia overnight on 3/30. Ep was consulted and added Flecainide 50 mg BID to her medication regimen 3/31. HR ***** improved vs not. Patient was placed on Home lisinopril and hydralazine was switched to amlodipine for control of her HTN.     Patient had positive blood cultures showing gram positive cocci in clusters Staphylococcus Hominis. Patient was started on IV rocephin. Repeat Blood cultures showed No growth. Patient was watched off of antibiotics and stayed afebrile without any evidence of acute infection.      ---  CONSULTANTS:   - Cardiology: Dr. Joe  - ID: Dr. Tolbert  - EP:      ---  TIME SPENT:  I, the attending physician, was physically present for the key portions of the evaluation and management (E/M) service provided. The total amount of time spent reviewing the hospital notes, laboratory values, imaging findings, assessing/counseling the patient, discussing with consultant physicians, social work, nursing staff was -- minutes    ---  Primary care provider was made aware of plan for discharge:      [  ] NO     [  ] YES   FROM ADMISSION H+P:   HPI:  89yo F with PMHx of atrial tachycardia, HTN, HLD, and hypothyroidism (not on meds anymore) presents to the ED with urinary frequency x1 day, associated with palpitations. Patient reports she started experiencing increased urinary frequency last night around midnight without associated dysuria or hematuria; patient states she felt she started getting anxious due to the increased urinary frequency and started feeling palpitations shortly after the urinary symptoms started; denies any associated chest pain/pressure, SOB, orthopnea, leg swelling. Patient endroses Hx of palpitations associated with anxiety in the past, but denies any Hx of Afib/atrial flutter .Per chart review, patient was hospitalized at [facility]  01/06/2024-01/13/2024 for palpitations; her home meds adjusted at that time. At the time, Cardizem, Toprol, and Clonidine were d/c'ed. Patient was started on Sotalol and Lisinopril. Outpatient, patient was started on hydralazine 10mg BID by Cardio Dr. Lugo; patient states she decreased her own dose to 10mg daily as she felt tired taking hydralzine BID; patient states Dr. Lugo aware of this. Patient states she had 3-4 UTIs last year, but reports no UTIs in 2025. Denies any recent ABx use.     Denies fever, chills, cough, abdominal pain, nausea, vomiting, diarrhea, constipation, headaches, changes in vision, dizziness, numbness, tingling.    Denies recent travel or sick contacts.    ED Course:   Vitals: BP: 180/128 HR: 120, Temp: 97.2, RR: 20, SpO2: 97% on RA  Labs:    WBCs 7.27, H/H 13.6/41.0, D-dimer 299, BUN/Cr 22/0.89, Na 137, K 4.1, Mg 2.3, Trop 8.9 --> 12.3, ProBNP 1536  UA: - LE, - nitrite, occasional bacteria   EKG: Premature Atrial Tachycardia   Received in the ED: S/p hydralazine 10mg PO x1, Lopressor 5mg IV x1, 500cc IV NS bolus x1  (28 Mar 2025 12:29)      ---  HOSPITAL COURSE:   Patient was admitted for atrial tachycardia. Troponins negative. Pro-BNP mildly elevated without signs of volume overload. Patient was started on Metoprolol 25 mg TID for control of atrial tachycardia. Repeat TTE shows Echocardiogram shows normal LV systolic function with EF 68% without other significant abnormalities/changes. CTA of the chest showed No pulmonary embolism. Metoprolol dose increased to 50 mg TID for continued tachycardia overnight on 3/30. EP was consulted and added Flecainide 50 mg BID to her medication regimen 3/31, with improvement in HR. Patient was placed on Home lisinopril and hydralazine was transitioned to amlodipine for control of her HTN.     Patient had positive blood cultures showing gram positive cocci in clusters Staphylococcus Hominis. Patient was started on IV rocephin. Repeat Blood cultures showed No growth. Patient was monitored off of antibiotics and stayed afebrile without any evidence of acute infection.    PT evaluated the patient recommended home PT.   Patient seen and examined at bedside on day of discharge. Medically optimized and stable for discharge to home with home PT.      ---  CONSULTANTS:   - Cardiology: Dr. Joe  - ID: Dr. Tolbert  - EP:      ---  TIME SPENT:  I, the attending physician, was physically present for the key portions of the evaluation and management (E/M) service provided. The total amount of time spent reviewing the hospital notes, laboratory values, imaging findings, assessing/counseling the patient, discussing with consultant physicians, social work, nursing staff was -- minutes    ---  Primary care provider was made aware of plan for discharge:      [  ] NO     [  ] YES   FROM ADMISSION H+P:   HPI:  89yo F with PMHx of atrial tachycardia, HTN, HLD, and hypothyroidism (not on meds anymore) presents to the ED with urinary frequency x1 day, associated with palpitations. Patient reports she started experiencing increased urinary frequency last night around midnight without associated dysuria or hematuria; patient states she felt she started getting anxious due to the increased urinary frequency and started feeling palpitations shortly after the urinary symptoms started; denies any associated chest pain/pressure, SOB, orthopnea, leg swelling. Patient endroses Hx of palpitations associated with anxiety in the past, but denies any Hx of Afib/atrial flutter .Per chart review, patient was hospitalized at [facility]  01/06/2024-01/13/2024 for palpitations; her home meds adjusted at that time. At the time, Cardizem, Toprol, and Clonidine were d/c'ed. Patient was started on Sotalol and Lisinopril. Outpatient, patient was started on hydralazine 10mg BID by Cardio Dr. Lugo; patient states she decreased her own dose to 10mg daily as she felt tired taking hydralzine BID; patient states Dr. Lugo aware of this. Patient states she had 3-4 UTIs last year, but reports no UTIs in 2025. Denies any recent ABx use.     Denies fever, chills, cough, abdominal pain, nausea, vomiting, diarrhea, constipation, headaches, changes in vision, dizziness, numbness, tingling.    Denies recent travel or sick contacts.    ED Course:   Vitals: BP: 180/128 HR: 120, Temp: 97.2, RR: 20, SpO2: 97% on RA  Labs:    WBCs 7.27, H/H 13.6/41.0, D-dimer 299, BUN/Cr 22/0.89, Na 137, K 4.1, Mg 2.3, Trop 8.9 --> 12.3, ProBNP 1536  UA: - LE, - nitrite, occasional bacteria   EKG: Premature Atrial Tachycardia   Received in the ED: S/p hydralazine 10mg PO x1, Lopressor 5mg IV x1, 500cc IV NS bolus x1  (28 Mar 2025 12:29)      ---  HOSPITAL COURSE:   Patient was admitted for atrial tachycardia. Troponins negative. Pro-BNP mildly elevated without signs of volume overload. Patient was started on Metoprolol 25 mg TID for control of atrial tachycardia. Repeat TTE shows Echocardiogram shows normal LV systolic function with EF 68% without other significant abnormalities/changes. CTA of the chest showed No pulmonary embolism. Metoprolol dose increased to 50 mg TID for continued tachycardia overnight on 3/30. EP was consulted and added Flecainide 50 mg BID to her medication regimen 3/31, with improvement in HR. Patient was placed on Home lisinopril and hydralazine was transitioned to amlodipine for control of her HTN.     Patient had positive blood cultures showing gram positive cocci in clusters Staphylococcus Hominis. Patient was started on IV rocephin. Repeat Blood cultures showed No growth. Patient was monitored off of antibiotics and stayed afebrile without any evidence of acute infection.    PT evaluated the patient recommended home PT.   Patient seen and examined at bedside on day of discharge. Medically optimized and stable for discharge to home with home PT.      ---  CONSULTANTS:   - Cardiology: Dr. Joe  - ID: Dr. Tolbert  - EP:

## 2025-03-29 NOTE — CONSULT NOTE ADULT - ASSESSMENT
89yo F with recurrent UTIs, atrial tachycardia, HTN, HLD, and hypothyroidism (not on meds anymore) presents to the ED with urinary frequency x1 day, associated with palpitations. Patient reports she started experiencing increased urinary frequency around midnight without associated dysuria or hematuria; patient states she felt she started getting anxious due to the increased urinary frequency and started feeling palpitations shortly after the urinary symptoms started; denies any associated chest pain/pressure, SOB, orthopnea, leg swelling.     Positive Blood culture  afebrile, no leukocytosis, Culture - Blood (03.28.25 @ 05:35)-  Coagulase negative Staphylococcus: Detec 2/2 so may be a contaminant  Repeat blood cultures collected    Dysuria and urinary frequency  not compelling for UTI so rec off further abx    Tachycardia cardiology involved    Thank you for consulting us and involving us in the management of this most interesting and challenging case.  In addition to reviewing history, imaging, documents, labs, microbiology, took into account antibiotic stewardship, local antibiogram and infection control strategies and potential transmission issues.    We will follow along in the care of this patient. Please contact me by texting me directly on my cell# at [phone] using TEAMS or call our answering service at [phone] with any concerns.

## 2025-03-30 LAB
-  CLINDAMYCIN: SIGNIFICANT CHANGE UP
-  ERYTHROMYCIN: SIGNIFICANT CHANGE UP
-  GENTAMICIN: SIGNIFICANT CHANGE UP
-  OXACILLIN: SIGNIFICANT CHANGE UP
-  PENICILLIN: SIGNIFICANT CHANGE UP
-  RIFAMPIN: SIGNIFICANT CHANGE UP
-  TETRACYCLINE: SIGNIFICANT CHANGE UP
-  TRIMETHOPRIM/SULFAMETHOXAZOLE: SIGNIFICANT CHANGE UP
-  VANCOMYCIN: SIGNIFICANT CHANGE UP
ALBUMIN SERPL ELPH-MCNC: 3 G/DL — LOW (ref 3.3–5)
ALP SERPL-CCNC: 71 U/L — SIGNIFICANT CHANGE UP (ref 40–120)
ALT FLD-CCNC: 16 U/L — SIGNIFICANT CHANGE UP (ref 12–78)
ANION GAP SERPL CALC-SCNC: 5 MMOL/L — SIGNIFICANT CHANGE UP (ref 5–17)
AST SERPL-CCNC: 14 U/L — LOW (ref 15–37)
BASOPHILS # BLD AUTO: 0 K/UL — SIGNIFICANT CHANGE UP (ref 0–0.2)
BASOPHILS NFR BLD AUTO: 0 % — SIGNIFICANT CHANGE UP (ref 0–2)
BILIRUB SERPL-MCNC: 0.4 MG/DL — SIGNIFICANT CHANGE UP (ref 0.2–1.2)
BUN SERPL-MCNC: 23 MG/DL — SIGNIFICANT CHANGE UP (ref 7–23)
CALCIUM SERPL-MCNC: 8.7 MG/DL — SIGNIFICANT CHANGE UP (ref 8.5–10.1)
CHLORIDE SERPL-SCNC: 102 MMOL/L — SIGNIFICANT CHANGE UP (ref 96–108)
CHOLEST SERPL-MCNC: 188 MG/DL — SIGNIFICANT CHANGE UP
CO2 SERPL-SCNC: 30 MMOL/L — SIGNIFICANT CHANGE UP (ref 22–31)
CREAT SERPL-MCNC: 0.98 MG/DL — SIGNIFICANT CHANGE UP (ref 0.5–1.3)
CULTURE RESULTS: ABNORMAL
CULTURE RESULTS: ABNORMAL
EGFR: 53 ML/MIN/1.73M2 — LOW
EGFR: 53 ML/MIN/1.73M2 — LOW
EOSINOPHIL # BLD AUTO: 0 K/UL — SIGNIFICANT CHANGE UP (ref 0–0.5)
EOSINOPHIL NFR BLD AUTO: 0 % — SIGNIFICANT CHANGE UP (ref 0–6)
GLUCOSE SERPL-MCNC: 109 MG/DL — HIGH (ref 70–99)
HCT VFR BLD CALC: 39.2 % — SIGNIFICANT CHANGE UP (ref 34.5–45)
HDLC SERPL-MCNC: 50 MG/DL — LOW
HGB BLD-MCNC: 12.9 G/DL — SIGNIFICANT CHANGE UP (ref 11.5–15.5)
LDLC SERPL-MCNC: 123 MG/DL — HIGH
LIPID PNL WITH DIRECT LDL SERPL: 123 MG/DL — HIGH
LYMPHOCYTES # BLD AUTO: 1.11 K/UL — SIGNIFICANT CHANGE UP (ref 1–3.3)
LYMPHOCYTES # BLD AUTO: 13 % — SIGNIFICANT CHANGE UP (ref 13–44)
MAGNESIUM SERPL-MCNC: 2.3 MG/DL — SIGNIFICANT CHANGE UP (ref 1.6–2.6)
MCHC RBC-ENTMCNC: 29.8 PG — SIGNIFICANT CHANGE UP (ref 27–34)
MCHC RBC-ENTMCNC: 32.9 G/DL — SIGNIFICANT CHANGE UP (ref 32–36)
MCV RBC AUTO: 90.5 FL — SIGNIFICANT CHANGE UP (ref 80–100)
METHOD TYPE: SIGNIFICANT CHANGE UP
MONOCYTES # BLD AUTO: 1.37 K/UL — HIGH (ref 0–0.9)
MONOCYTES NFR BLD AUTO: 16 % — HIGH (ref 2–14)
NEUTROPHILS # BLD AUTO: 6.07 K/UL — SIGNIFICANT CHANGE UP (ref 1.8–7.4)
NEUTROPHILS NFR BLD AUTO: 71 % — SIGNIFICANT CHANGE UP (ref 43–77)
NONHDLC SERPL-MCNC: 137 MG/DL — HIGH
NRBC BLD AUTO-RTO: SIGNIFICANT CHANGE UP /100 WBCS (ref 0–0)
ORGANISM # SPEC MICROSCOPIC CNT: ABNORMAL
ORGANISM # SPEC MICROSCOPIC CNT: ABNORMAL
ORGANISM # SPEC MICROSCOPIC CNT: SIGNIFICANT CHANGE UP
PHOSPHATE SERPL-MCNC: 4 MG/DL — SIGNIFICANT CHANGE UP (ref 2.5–4.5)
PLATELET # BLD AUTO: 200 K/UL — SIGNIFICANT CHANGE UP (ref 150–400)
POTASSIUM SERPL-MCNC: 5 MMOL/L — SIGNIFICANT CHANGE UP (ref 3.5–5.3)
POTASSIUM SERPL-SCNC: 5 MMOL/L — SIGNIFICANT CHANGE UP (ref 3.5–5.3)
PROT SERPL-MCNC: 6.4 G/DL — SIGNIFICANT CHANGE UP (ref 6–8.3)
RBC # BLD: 4.33 M/UL — SIGNIFICANT CHANGE UP (ref 3.8–5.2)
RBC # FLD: 14.2 % — SIGNIFICANT CHANGE UP (ref 10.3–14.5)
SODIUM SERPL-SCNC: 137 MMOL/L — SIGNIFICANT CHANGE UP (ref 135–145)
SPECIMEN SOURCE: SIGNIFICANT CHANGE UP
SPECIMEN SOURCE: SIGNIFICANT CHANGE UP
TRIGL SERPL-MCNC: 77 MG/DL — SIGNIFICANT CHANGE UP
TSH SERPL-MCNC: 2.33 UIU/ML — SIGNIFICANT CHANGE UP (ref 0.36–3.74)
WBC # BLD: 8.55 K/UL — SIGNIFICANT CHANGE UP (ref 3.8–10.5)
WBC # FLD AUTO: 8.55 K/UL — SIGNIFICANT CHANGE UP (ref 3.8–10.5)

## 2025-03-30 PROCEDURE — 99233 SBSQ HOSP IP/OBS HIGH 50: CPT | Mod: GC

## 2025-03-30 PROCEDURE — 71275 CT ANGIOGRAPHY CHEST: CPT | Mod: 26

## 2025-03-30 PROCEDURE — 99232 SBSQ HOSP IP/OBS MODERATE 35: CPT

## 2025-03-30 PROCEDURE — 93306 TTE W/DOPPLER COMPLETE: CPT | Mod: 26

## 2025-03-30 RX ORDER — AMLODIPINE BESYLATE 10 MG/1
5 TABLET ORAL DAILY
Refills: 0 | Status: DISCONTINUED | OUTPATIENT
Start: 2025-03-30 | End: 2025-04-01

## 2025-03-30 RX ORDER — METOPROLOL SUCCINATE 50 MG/1
50 TABLET, EXTENDED RELEASE ORAL EVERY 8 HOURS
Refills: 0 | Status: DISCONTINUED | OUTPATIENT
Start: 2025-03-30 | End: 2025-04-01

## 2025-03-30 RX ORDER — METOPROLOL SUCCINATE 50 MG/1
25 TABLET, EXTENDED RELEASE ORAL EVERY 6 HOURS
Refills: 0 | Status: DISCONTINUED | OUTPATIENT
Start: 2025-03-30 | End: 2025-03-30

## 2025-03-30 RX ADMIN — CEFTRIAXONE 100 MILLIGRAM(S): 500 INJECTION, POWDER, FOR SOLUTION INTRAMUSCULAR; INTRAVENOUS at 06:04

## 2025-03-30 RX ADMIN — Medication 650 MILLIGRAM(S): at 14:00

## 2025-03-30 RX ADMIN — Medication 2 TABLET(S): at 22:47

## 2025-03-30 RX ADMIN — Medication 650 MILLIGRAM(S): at 13:04

## 2025-03-30 RX ADMIN — ENOXAPARIN SODIUM 40 MILLIGRAM(S): 100 INJECTION SUBCUTANEOUS at 17:22

## 2025-03-30 RX ADMIN — Medication 10 MILLIGRAM(S): at 06:04

## 2025-03-30 RX ADMIN — METOPROLOL SUCCINATE 50 MILLIGRAM(S): 50 TABLET, EXTENDED RELEASE ORAL at 13:04

## 2025-03-30 RX ADMIN — LISINOPRIL 20 MILLIGRAM(S): 5 TABLET ORAL at 06:04

## 2025-03-30 RX ADMIN — METOPROLOL SUCCINATE 50 MILLIGRAM(S): 50 TABLET, EXTENDED RELEASE ORAL at 22:47

## 2025-03-30 RX ADMIN — LISINOPRIL 20 MILLIGRAM(S): 5 TABLET ORAL at 17:23

## 2025-03-30 RX ADMIN — METOPROLOL SUCCINATE 25 MILLIGRAM(S): 50 TABLET, EXTENDED RELEASE ORAL at 06:04

## 2025-03-30 NOTE — PROGRESS NOTE ADULT - PROBLEM SELECTOR PLAN 4
Chronic. Not on home meds anymore.  - Patient reports statin was d/c'ed a few a years ago  - Lipid profile in AM  - Recommend outpatient follow up

## 2025-03-30 NOTE — PROGRESS NOTE ADULT - ASSESSMENT
89yo F with recurrent UTIs, atrial tachycardia, HTN, HLD, and hypothyroidism (not on meds anymore) presents to the ED with urinary frequency x1 day, associated with palpitations. Patient reports she started experiencing increased urinary frequency around midnight without associated dysuria or hematuria; patient states she felt she started getting anxious due to the increased urinary frequency and started feeling palpitations shortly after the urinary symptoms started; denies any associated chest pain/pressure, SOB, orthopnea, leg swelling.     Positive Blood culture  afebrile, no leukocytosis, Culture - Blood (03.28.25 @ 05:35)-  Coagulase negative Staphylococcus: Detec 2/2 so may be a contaminant  Culture - Blood (03.29.25 @ 07:00)  No growth at 24 hours    Dysuria and urinary frequency  not compelling for UTI so rec off further abx    Tachycardia cardiology involved    Thank you for consulting us and involving us in the management of this most interesting and challenging case.  In addition to reviewing history, imaging, documents, labs, microbiology, took into account antibiotic stewardship, local antibiogram and infection control strategies and potential transmission issues.    We will follow along in the care of this patient. Please contact me by texting me directly on my cell# at [phone] using TEAMS or call our answering service at [phone] with any concerns.

## 2025-03-30 NOTE — PROGRESS NOTE ADULT - SUBJECTIVE AND OBJECTIVE BOX
Patient is a 89y old  Female who presents with a chief complaint of Atrial tachycardia (30 Mar 2025 06:48)      INTERVAL HPI/OVERNIGHT EVENTS: No acute overnight events. Pt was seen and examined at bedside. Pt states that she is currently feeling well. denies any continued urinary frequency or dysuria. Pt denies headache, dizziness, lightheadedness, fever, chills, body aches, CP, SOB, palpitations, abdominal pain, n/v, numbness/tingling.  No other complaints at this time.     MEDICATIONS  (STANDING):  cefTRIAXone   IVPB 1000 milliGRAM(s) IV Intermittent every 24 hours  enoxaparin Injectable 40 milliGRAM(s) SubCutaneous every 24 hours  hydrALAZINE 10 milliGRAM(s) Oral every 8 hours  lisinopril 20 milliGRAM(s) Oral two times a day  metoprolol tartrate 25 milliGRAM(s) Oral every 6 hours  polyethylene glycol 3350 17 Gram(s) Oral daily  senna 2 Tablet(s) Oral at bedtime    MEDICATIONS  (PRN):  acetaminophen     Tablet .. 650 milliGRAM(s) Oral every 6 hours PRN Temp greater or equal to 38C (100.4F), Mild Pain (1 - 3)      Allergies    penicillins (Rash)    Intolerances        REVIEW OF SYSTEMS:  CONSTITUTIONAL: No fever or chills  HEENT:  No headache, no sore throat  RESPIRATORY: No cough, wheezing, or shortness of breath  CARDIOVASCULAR: No chest pain, palpitations  GASTROINTESTINAL: No abd pain, nausea, vomiting, or diarrhea  GENITOURINARY: No dysuria, frequency, or hematuria  NEUROLOGICAL: no focal weakness or dizziness  MUSCULOSKELETAL: no myalgias     Vital Signs Last 24 Hrs  T(C): 36.8 (30 Mar 2025 04:46), Max: 36.8 (30 Mar 2025 04:46)  T(F): 98.3 (30 Mar 2025 04:46), Max: 98.3 (30 Mar 2025 04:46)  HR: 109 (30 Mar 2025 04:46) (102 - 111)  BP: 131/85 (30 Mar 2025 04:46) (113/72 - 132/85)  BP(mean): --  RR: 17 (30 Mar 2025 04:46) (17 - 18)  SpO2: 93% (30 Mar 2025 04:46) (93% - 96%)    Parameters below as of 30 Mar 2025 04:46  Patient On (Oxygen Delivery Method): room air        PHYSICAL EXAM:  GENERAL: NAD  HEENT:  anicteric, moist mucous membranes  CHEST/LUNG:  CTA b/l, no rales, wheezes, or rhonchi  HEART:  Tachycardic rate, S1, S2 no m/r/g  ABDOMEN:  BS+, soft, nontender, nondistended  EXTREMITIES: no edema, cyanosis, or calf tenderness  NERVOUS SYSTEM: answers questions and follows commands appropriately    LABS:                        12.9   8.55  )-----------( 200      ( 30 Mar 2025 07:09 )             39.2     CBC Full  -  ( 30 Mar 2025 07:09 )  WBC Count : 8.55 K/uL  Hemoglobin : 12.9 g/dL  Hematocrit : 39.2 %  Platelet Count - Automated : 200 K/uL  Mean Cell Volume : 90.5 fl  Mean Cell Hemoglobin : 29.8 pg  Mean Cell Hemoglobin Concentration : 32.9 g/dL  Auto Neutrophil # : x  Auto Lymphocyte # : x  Auto Monocyte # : x  Auto Eosinophil # : x  Auto Basophil # : x  Auto Neutrophil % : x  Auto Lymphocyte % : x  Auto Monocyte % : x  Auto Eosinophil % : x  Auto Basophil % : x    30 Mar 2025 07:09    137    |  102    |  23     ----------------------------<  109    5.0     |  30     |  0.98     Ca    8.7        30 Mar 2025 07:09  Phos  4.0       30 Mar 2025 07:09  Mg     2.3       30 Mar 2025 07:09    TPro  6.4    /  Alb  3.0    /  TBili  0.4    /  DBili  x      /  AST  14     /  ALT  16     /  AlkPhos  71     30 Mar 2025 07:09      Urinalysis Basic - ( 30 Mar 2025 07:09 )    Color: x / Appearance: x / SG: x / pH: x  Gluc: 109 mg/dL / Ketone: x  / Bili: x / Urobili: x   Blood: x / Protein: x / Nitrite: x   Leuk Esterase: x / RBC: x / WBC x   Sq Epi: x / Non Sq Epi: x / Bacteria: x      CAPILLARY BLOOD GLUCOSE            Urinalysis with Rflx Culture (collected 03-28-25 @ 06:25)    Culture - Blood (collected 03-28-25 @ 05:35)  Source: Blood Blood-Venous  Gram Stain (03-29-25 @ 14:19):    Growth in aerobic bottle: Gram Positive Cocci in Clusters    Growth in anaerobic bottle: Gram Positive Cocci in Clusters  Preliminary Report (03-29-25 @ 21:47):    Growth in aerobic bottle: Staphylococcus hominis Susceptibility to follow.    Growth in anaerobic bottle: Gram Positive Cocci in Clusters    Direct identification is available within approximately 3-5    hours either by Blood Panel Multiplexed PCR or Direct    MALDI-TOF. Details: [email or web address]  Organism: Blood Culture PCR (03-29-25 @ 04:27)  Organism: Blood Culture PCR (03-29-25 @ 04:27)      Method Type: PCR      -  Coagulase negative Staphylococcus: Detec    Culture - Blood (collected 03-28-25 @ 05:25)  Source: Blood Blood-Peripheral  Gram Stain (03-29-25 @ 04:36):    Growth in anaerobic bottle: Gram Positive Cocci in Clusters  Preliminary Report (03-29-25 @ 21:48):    Growth in anaerobic bottle: Staphylococcus hominis    See previous culture [id number]        RADIOLOGY & ADDITIONAL TESTS: ____    Personally reviewed.     Consultant(s) Notes Reviewed:  [x] YES  [ ] NO     Patient is a 89y old  Female who presents with a chief complaint of Atrial tachycardia (30 Mar 2025 06:48)      INTERVAL HPI/OVERNIGHT EVENTS: No acute overnight events. Continues to be tachycardic to the low 100's overnight. Pt was seen and examined at bedside. Pt states that she is currently feeling well. denies any continued urinary frequency or dysuria. Pt denies headache, dizziness, lightheadedness, fever, chills, body aches, CP, SOB, palpitations, abdominal pain, n/v, numbness/tingling.  No other complaints at this time.     MEDICATIONS  (STANDING):  cefTRIAXone   IVPB 1000 milliGRAM(s) IV Intermittent every 24 hours  enoxaparin Injectable 40 milliGRAM(s) SubCutaneous every 24 hours  hydrALAZINE 10 milliGRAM(s) Oral every 8 hours  lisinopril 20 milliGRAM(s) Oral two times a day  metoprolol tartrate 25 milliGRAM(s) Oral every 6 hours  polyethylene glycol 3350 17 Gram(s) Oral daily  senna 2 Tablet(s) Oral at bedtime    MEDICATIONS  (PRN):  acetaminophen     Tablet .. 650 milliGRAM(s) Oral every 6 hours PRN Temp greater or equal to 38C (100.4F), Mild Pain (1 - 3)      Allergies    penicillins (Rash)    Intolerances        REVIEW OF SYSTEMS:  CONSTITUTIONAL: No fever or chills  HEENT:  No headache, no sore throat  RESPIRATORY: No cough, wheezing, or shortness of breath  CARDIOVASCULAR: No chest pain, palpitations  GASTROINTESTINAL: No abd pain, nausea, vomiting, or diarrhea  GENITOURINARY: No dysuria, frequency, or hematuria  NEUROLOGICAL: no focal weakness or dizziness  MUSCULOSKELETAL: no myalgias     Vital Signs Last 24 Hrs  T(C): 36.8 (30 Mar 2025 04:46), Max: 36.8 (30 Mar 2025 04:46)  T(F): 98.3 (30 Mar 2025 04:46), Max: 98.3 (30 Mar 2025 04:46)  HR: 109 (30 Mar 2025 04:46) (102 - 111)  BP: 131/85 (30 Mar 2025 04:46) (113/72 - 132/85)  BP(mean): --  RR: 17 (30 Mar 2025 04:46) (17 - 18)  SpO2: 93% (30 Mar 2025 04:46) (93% - 96%)    Parameters below as of 30 Mar 2025 04:46  Patient On (Oxygen Delivery Method): room air        PHYSICAL EXAM:  GENERAL: NAD  HEENT:  anicteric, moist mucous membranes  CHEST/LUNG:  CTA b/l, no rales, wheezes, or rhonchi  HEART:  Tachycardic rate, S1, S2 no m/r/g  ABDOMEN:  BS+, soft, nontender, nondistended  EXTREMITIES: no edema, cyanosis, or calf tenderness  NERVOUS SYSTEM: answers questions and follows commands appropriately    LABS:                        12.9   8.55  )-----------( 200      ( 30 Mar 2025 07:09 )             39.2     CBC Full  -  ( 30 Mar 2025 07:09 )  WBC Count : 8.55 K/uL  Hemoglobin : 12.9 g/dL  Hematocrit : 39.2 %  Platelet Count - Automated : 200 K/uL  Mean Cell Volume : 90.5 fl  Mean Cell Hemoglobin : 29.8 pg  Mean Cell Hemoglobin Concentration : 32.9 g/dL  Auto Neutrophil # : x  Auto Lymphocyte # : x  Auto Monocyte # : x  Auto Eosinophil # : x  Auto Basophil # : x  Auto Neutrophil % : x  Auto Lymphocyte % : x  Auto Monocyte % : x  Auto Eosinophil % : x  Auto Basophil % : x    30 Mar 2025 07:09    137    |  102    |  23     ----------------------------<  109    5.0     |  30     |  0.98     Ca    8.7        30 Mar 2025 07:09  Phos  4.0       30 Mar 2025 07:09  Mg     2.3       30 Mar 2025 07:09    TPro  6.4    /  Alb  3.0    /  TBili  0.4    /  DBili  x      /  AST  14     /  ALT  16     /  AlkPhos  71     30 Mar 2025 07:09      Urinalysis Basic - ( 30 Mar 2025 07:09 )    Color: x / Appearance: x / SG: x / pH: x  Gluc: 109 mg/dL / Ketone: x  / Bili: x / Urobili: x   Blood: x / Protein: x / Nitrite: x   Leuk Esterase: x / RBC: x / WBC x   Sq Epi: x / Non Sq Epi: x / Bacteria: x      CAPILLARY BLOOD GLUCOSE            Urinalysis with Rflx Culture (collected 03-28-25 @ 06:25)    Culture - Blood (collected 03-28-25 @ 05:35)  Source: Blood Blood-Venous  Gram Stain (03-29-25 @ 14:19):    Growth in aerobic bottle: Gram Positive Cocci in Clusters    Growth in anaerobic bottle: Gram Positive Cocci in Clusters  Preliminary Report (03-29-25 @ 21:47):    Growth in aerobic bottle: Staphylococcus hominis Susceptibility to follow.    Growth in anaerobic bottle: Gram Positive Cocci in Clusters    Direct identification is available within approximately 3-5    hours either by Blood Panel Multiplexed PCR or Direct    MALDI-TOF. Details: [email or web address]  Organism: Blood Culture PCR (03-29-25 @ 04:27)  Organism: Blood Culture PCR (03-29-25 @ 04:27)      Method Type: PCR      -  Coagulase negative Staphylococcus: Detec    Culture - Blood (collected 03-28-25 @ 05:25)  Source: Blood Blood-Peripheral  Gram Stain (03-29-25 @ 04:36):    Growth in anaerobic bottle: Gram Positive Cocci in Clusters  Preliminary Report (03-29-25 @ 21:48):    Growth in anaerobic bottle: Staphylococcus hominis    See previous culture [id number]        RADIOLOGY & ADDITIONAL TESTS: ____    Personally reviewed.     Consultant(s) Notes Reviewed:  [x] YES  [ ] NO

## 2025-03-30 NOTE — PROGRESS NOTE ADULT - PROBLEM SELECTOR PLAN 3
/128 --> 150/85 on admission. On home fosinopril 20mg BID, hydralazine 10mg qd   - BP elevated on admission,  stable at this time  - Continue lisinopril interchange.   - Hydralazine 10mg TID  - Continue home hydralazine with hold parameters   - Monitor hemodynamics /128 --> 150/85 on admission. On home fosinopril 20mg BID, hydralazine 10mg qd   - BP elevated on admission,  stable at this time  - Continue lisinopril interchange.   - Holding Hydralazine given labile HR. Start amlodipine 5 mg for BP control  - Continue home hydralazine with hold parameters   - Monitor hemodynamics

## 2025-03-30 NOTE — PROGRESS NOTE ADULT - SUBJECTIVE AND OBJECTIVE BOX
[facility] -- Hugo Goddard Pannella, Patel, Savella, Goodger, Cohen  Office # [phone]    Follow Up:   Palpitations        Subjective/Observations:     REVIEW OF SYSTEMS: All other review of systems is negative unless indicated above  PAST MEDICAL & SURGICAL HISTORY:  HTN - Hypertension      Hypercholesteremia      Atrial tachycardia      Hypothyroid      S/P Cataract Surgery  B/L      Liver cyst  removed        MEDICATIONS  (STANDING):  cefTRIAXone   IVPB 1000 milliGRAM(s) IV Intermittent every 24 hours  enoxaparin Injectable 40 milliGRAM(s) SubCutaneous every 24 hours  hydrALAZINE 10 milliGRAM(s) Oral every 8 hours  lisinopril 20 milliGRAM(s) Oral two times a day  metoprolol tartrate 25 milliGRAM(s) Oral every 8 hours  polyethylene glycol 3350 17 Gram(s) Oral daily  senna 2 Tablet(s) Oral at bedtime    MEDICATIONS  (PRN):  acetaminophen     Tablet .. 650 milliGRAM(s) Oral every 6 hours PRN Temp greater or equal to 38C (100.4F), Mild Pain (1 - 3)    Allergies    penicillins (Rash)    Intolerances      Vital Signs Last 24 Hrs  T(C): 36.8 (30 Mar 2025 04:46), Max: 36.8 (30 Mar 2025 04:46)  T(F): 98.3 (30 Mar 2025 04:46), Max: 98.3 (30 Mar 2025 04:46)  HR: 109 (30 Mar 2025 04:46) (101 - 111)  BP: 131/85 (30 Mar 2025 04:46) (113/72 - 132/85)  BP(mean): --  RR: 17 (30 Mar 2025 04:46) (17 - 18)  SpO2: 93% (30 Mar 2025 04:46) (93% - 98%)    Parameters below as of 30 Mar 2025 04:46  Patient On (Oxygen Delivery Method): room air      I&O's Summary    28 Mar 2025 07:01  -  29 Mar 2025 07:00  --------------------------------------------------------  IN: 0 mL / OUT: 450 mL / NET: -450 mL    29 Mar 2025 07:01  -  30 Mar 2025 06:48  --------------------------------------------------------  IN: 770 mL / OUT: 700 mL / NET: 70 mL          TELE: [facility] 90's - 100's   PHYSICAL EXAM:  Constitutional: NAD, awake and alert  HEENT: Moist Mucous Membranes, Anicteric  Pulmonary: Non-labored, breath sounds are clear bilaterally, No wheezing, rales or rhonchi  Cardiovascular: Regular, S1 and S2, No murmurs, rubs, gallops or clicks  Gastrointestinal: Bowel Sounds present, soft, nontender.   Lymph: No peripheral edema. No lymphadenopathy.  Skin: No visible rashes or ulcers.  Psych:  Mood & affect appropriate  LABS: All Labs Reviewed:                      12.7   8.21  )-----------( 190      ( 29 Mar 2025 06:55 )             38.3                         13.6   7.27  )-----------( 217      ( 28 Mar 2025 05:35 )             41.0     29 Mar 2025 06:55    136    |  105    |  20     ----------------------------<  88     4.5     |  24     |  0.81   28 Mar 2025 05:35    137    |  104    |  22     ----------------------------<  124    4.2     |  27     |  0.89     Ca    9.0        29 Mar 2025 06:55  Ca    9.1        28 Mar 2025 05:35  Phos  3.8       29 Mar 2025 06:55  Mg     2.0       29 Mar 2025 06:55  Mg     2.3       28 Mar 2025 05:35    TPro  6.5    /  Alb  2.8    /  TBili  0.7    /  DBili  x      /  AST  25     /  ALT  21     /  AlkPhos  71     29 Mar 2025 06:55  TPro  7.4    /  Alb  3.7    /  TBili  0.7    /  DBili  x      /  AST  25     /  ALT  27     /  AlkPhos  91     28 Mar 2025 05:35          12 Lead ECG:   Ventricular Rate 102 BPM    Atrial Rate 102 BPM    P-R Interval 152 ms    QRS Duration 96 ms    Q-T Interval 360 ms    QTC Calculation(Bazett) 469 ms    P Axis -26 degrees    R Axis -62 degrees    T Axis 28 degrees    Diagnosis Line Atrial tachycardia  Left anterior fascicular block  Minimal voltage criteria for LVH, may be normal variant ( [place] product )  Anteroseptal infarct (cited on or before 28-MAR-2025)  Abnormal ECG  Confirmed by natalia Joe ([id number]) on 3/29/2025 3:39:32 PM (03-29-25 @ 09:03)      TRANSTHORACIC ECHOCARDIOGRAM REPORT  ________________________________________________________________________________                                      _______       Pt. Name:       ROBERT BELLA Study Date:    1/11/2024  MRN:            [id number]       YOB: 1929  Accession #:    [id number]      Age:           89 years  Account#:       [id number]     Gender:        F  Heart Rate:                    Height:        59.06 in (150.00 cm)  Rhythm:                        Weight:   110.23 lb (50.00 kg)  Blood Pressure: 157/77 mmHg    BSA/BMI:       1.43 m² / 22.22 kg/m²  ________________________________________________________________________________________  Referring Physician:    Mahendra Greene  Interpreting Physician: Ap Arellano  Primary Sonographer:    Donal Orozco    CPT:               ECHO TTE WO CON COMP W DOPP - 47463.m  Indication(s):     Abnormal electrocardiogram ECG/EKG - R94.31  Procedure:         Transthoracic echocardiogram with 2-D, M-mode and complete                 spectral and color flow Doppler.  Ordering Location: [facility]  Admission Status:  Inpatient  Study Information: Image quality for this study is technically difficult.    _______________________________________________________________________________________     CONCLUSIONS:      1. Technically difficult image quality.   2. Left ventricular systolic function is normal with an ejection fraction of 68 % by Hinton's method of disks.   3. There is normal LV mass and concentric remodeling.   4. Normal right ventricular cavity size and normal systolic function.   5. The left atrium is normal.   6. Mitral valve leaflets have focal calcification.   7. Mild mitral regurgitation.   8. Trileaflet aortic valve with normal systolic excursion. There is focal calcification of the aortic valve leaflets.   9. Mild aortic regurgitation.  10. Estimated pulmonary artery systolic pressure is 35 mmHg, consistent with normal pulmonary artery pressure.  11. Echo-free space is noted in the liver consistent with cyst, however, dedicated imaging recommended for further evaluation if clinically indicated.    ________________________________________________________________________________________  FINDINGS:     Left Ventricle:  Left ventricular systolic function is normal with a calculated ejection fraction of 68 % by the Hinton's biplane method of disks. There is normal LV mass and concentric remodeling.     Right Ventricle:  The right ventricular cavity is normal in size and normal systolic function. Tricuspid annular plane systolic excursion (TAPSE) is 2.4 cm (normal >=1.7 cm).     Left Atrium:  The left atrium is normal with an indexed volume of 21.08 ml/m².     Right Atrium:  The right atrium is normal in size.     Aortic Valve:  The aortic valve appears trileaflet with normal systolic excursion. There is focal calcification of the aortic valve leaflets. There is mild aortic regurgitation.     Mitral Valve:  Mitral valve leaflets have focal calcification. There is mild mitral regurgitation.     Tricuspid Valve:  Structurally normal tricuspid valve with normal leaflet excursion. There is mild to moderate tricuspid regurgitation. Estimated pulmonary artery systolic pressure is 35 mmHg, consistent with normal pulmonary artery pressure.     Pulmonic Valve:  The pulmonic valve was not well visualized. There is mild to moderate pulmonic regurgitation.     Aorta:  The aortic root at the sinuses of Valsalva is normal in size, measuring 2.50 cm (indexed 1.75 cm/m²). The ascending aortadiameter is normal in size, measuring 2.30 cm (indexed 1.61 cm/m²).     Extra-cardiac Findings:  Echo-free space is noted in the liver consistent with cyst, however, dedicated imaging recommended for further evaluation if clinically indicated.     Systemic Veins:  The inferior vena cava is normal in size measuring 0.99 cm in diameter, (normal <2.1cm) with normal inspiratory collapse (normal >50%) consistent with normal right atrial pressure (~3, range 0-5mmHg).  ____________________________________________________________________  QUANTITATIVE DATA:  Left Ventricle Measurements: (Indexed to BSA)     IVSd (2D):   1.0 cm  LVPWd (2D):  0.9 cm  LVIDd (2D):  3.5 cm  LVIDs (2D):  2.1 cm  LV Mass:     93 g   64.6 g/m²  LV Vol d, MOD A2C: 31.9 ml 22.27 ml/m²  LV Vol d, MOD A4C: 43.4 ml 30.30 ml/m²  LV Vol d, MOD BP:  37.7 ml 26.29 ml/m²  LV Vol s, MOD A2C: 10.1 ml 7.05 ml/m²  LV Vol s, MOD A4C: 14.6 ml 10.19 ml/m²  LV Vol s, MOD BP:  11.9 ml 8.29 ml/m²  LVOT SV MOD BP:    25.8 ml  LV EF% MOD BP:   68 %     MV E Vmax:    1.00 m/s  MV A Vmax:    1.28 m/s  MV E/A:       0.78  e' lateral:   22.40 cm/s  e' medial:    14.60 cm/s  E/e' lateral: 4.45  E/e' medial:  6.82  E/e' Average: 5.38  MV DT:        278 msec    Aorta Measurements: (normal range) (Indexed to BSA)     Sinuses of Valsalva: 2.50 cm (2.7 - 3.3 cm)  Ao Asc prox:         2.30 cm       Left Atrium Measurements: (Indexed to BSA)  LA Diam 2D: 3.30 cm    Right Ventricle Measurements:     TAPSE:            2.4 cm  RV Base (RVID1):  2.7 cm  RV Mid (RVID2):   2.7 cm  RV Major (RVID3): 6.2 cm       LVOT / RVOT/ Qp/Qs Data: (Indexed to BSA)  LVOT Diameter: 1.50 cm    Mitral Valve Measurements:     MV E Vmax: 1.0 m/s  MV A Vmax: 1.3 m/s  MV E/A:    0.8       Tricuspid Valve Measurements:     TR Vmax:          2.8 m/s  TR Peak Gradient: 31.8 mmHg  RA Pressure:      3 mmHg  PASP:             35 mmHg    ________________________________________________________________________________________  Electronically signed on 1/11/2024 at 5:21:08 PM by Ap Arellano         *** Final ***      [facility] -- Hugo Goddard Pannella, Patel, Savella, Goodger, Cohen  Office # [phone]    Follow Up:   Palpitations    Subjective/Observations: seen and examined, awake, alert, resting in bed, denies chest pain, dyspnea, palpitations or dizziness, orthopnea and PND. Tolerating room air.  REVIEW OF SYSTEMS: All other review of systems is negative unless indicated above  PAST MEDICAL & SURGICAL HISTORY:  HTN - Hypertension      Hypercholesteremia      Atrial tachycardia      Hypothyroid      S/P Cataract Surgery  B/L      Liver cyst  removed        MEDICATIONS  (STANDING):  cefTRIAXone   IVPB 1000 milliGRAM(s) IV Intermittent every 24 hours  enoxaparin Injectable 40 milliGRAM(s) SubCutaneous every 24 hours  hydrALAZINE 10 milliGRAM(s) Oral every 8 hours  lisinopril 20 milliGRAM(s) Oral two times a day  metoprolol tartrate 25 milliGRAM(s) Oral every 8 hours  polyethylene glycol 3350 17 Gram(s) Oral daily  senna 2 Tablet(s) Oral at bedtime    MEDICATIONS  (PRN):  acetaminophen     Tablet .. 650 milliGRAM(s) Oral every 6 hours PRN Temp greater or equal to 38C (100.4F), Mild Pain (1 - 3)    Allergies    penicillins (Rash)    Intolerances      Vital Signs Last 24 Hrs  T(C): 36.8 (30 Mar 2025 04:46), Max: 36.8 (30 Mar 2025 04:46)  T(F): 98.3 (30 Mar 2025 04:46), Max: 98.3 (30 Mar 2025 04:46)  HR: 109 (30 Mar 2025 04:46) (101 - 111)  BP: 131/85 (30 Mar 2025 04:46) (113/72 - 132/85)  BP(mean): --  RR: 17 (30 Mar 2025 04:46) (17 - 18)  SpO2: 93% (30 Mar 2025 04:46) (93% - 98%)    Parameters below as of 30 Mar 2025 04:46  Patient On (Oxygen Delivery Method): room air      I&O's Summary    28 Mar 2025 07:01  -  29 Mar 2025 07:00  --------------------------------------------------------  IN: 0 mL / OUT: 450 mL / NET: -450 mL    29 Mar 2025 07:01  -  30 Mar 2025 06:48  --------------------------------------------------------  IN: 770 mL / OUT: 700 mL / NET: 70 mL          TELE: [facility] 90's - 110's   PHYSICAL EXAM:  Constitutional: NAD, awake and alert  HEENT: Moist Mucous Membranes, Anicteric  Pulmonary: Non-labored, breath sounds are clear bilaterally, No wheezing, rales or rhonchi  Cardiovascular: Regular, S1 and S2, No murmurs, rubs, gallops or clicks  Gastrointestinal: Bowel Sounds present, soft, nontender.   Lymph: No peripheral edema. No lymphadenopathy.  Skin: No visible rashes or ulcers.  Psych:  Mood & affect appropriate  LABS: All Labs Reviewed:                      12.7   8.21  )-----------( 190      ( 29 Mar 2025 06:55 )             38.3                         13.6   7.27  )-----------( 217      ( 28 Mar 2025 05:35 )             41.0     29 Mar 2025 06:55    136    |  105    |  20     ----------------------------<  88     4.5     |  24     |  0.81   28 Mar 2025 05:35    137    |  104    |  22     ----------------------------<  124    4.2     |  27     |  0.89     Ca    9.0        29 Mar 2025 06:55  Ca    9.1        28 Mar 2025 05:35  Phos  3.8       29 Mar 2025 06:55  Mg     2.0       29 Mar 2025 06:55  Mg     2.3       28 Mar 2025 05:35    TPro  6.5    /  Alb  2.8    /  TBili  0.7    /  DBili  x      /  AST  25     /  ALT  21     /  AlkPhos  71     29 Mar 2025 06:55  TPro  7.4    /  Alb  3.7    /  TBili  0.7    /  DBili  x      /  AST  25     /  ALT  27     /  AlkPhos  91     28 Mar 2025 05:35          12 Lead ECG:   Ventricular Rate 102 BPM    Atrial Rate 102 BPM    P-R Interval 152 ms    QRS Duration 96 ms    Q-T Interval 360 ms    QTC Calculation(Bazett) 469 ms    P Axis -26 degrees    R Axis -62 degrees    T Axis 28 degrees    Diagnosis Line Atrial tachycardia  Left anterior fascicular block  Minimal voltage criteria for LVH, may be normal variant ( [place] product )  Anteroseptal infarct (cited on or before 28-MAR-2025)  Abnormal ECG  Confirmed by natalia Joe ([id number]) on 3/29/2025 3:39:32 PM (03-29-25 @ 09:03)      TRANSTHORACIC ECHOCARDIOGRAM REPORT  ________________________________________________________________________________                                      _______       Pt. Name:       ROBERT BELLA Study Date:    1/11/2024  MRN:            [id number]       YOB: 1929  Accession #:    [id number]      Age:           89 years  Account#:       [id number]     Gender:        F  Heart Rate:                    Height:        59.06 in (150.00 cm)  Rhythm:                        Weight:   110.23 lb (50.00 kg)  Blood Pressure: 157/77 mmHg    BSA/BMI:       1.43 m² / 22.22 kg/m²  ________________________________________________________________________________________  Referring Physician:    Mahendra Greene  Interpreting Physician: Ap Arellano  Primary Sonographer:    Donal Orozco    CPT:               ECHO TTE WO CON COMP W DOPP - 09447.m  Indication(s):     Abnormal electrocardiogram ECG/EKG - R94.31  Procedure:         Transthoracic echocardiogram with 2-D, M-mode and complete                 spectral and color flow Doppler.  Ordering Location: [facility]  Admission Status:  Inpatient  Study Information: Image quality for this study is technically difficult.    _______________________________________________________________________________________     CONCLUSIONS:      1. Technically difficult image quality.   2. Left ventricular systolic function is normal with an ejection fraction of 68 % by Hinton's method of disks.   3. There is normal LV mass and concentric remodeling.   4. Normal right ventricular cavity size and normal systolic function.   5. The left atrium is normal.   6. Mitral valve leaflets have focal calcification.   7. Mild mitral regurgitation.   8. Trileaflet aortic valve with normal systolic excursion. There is focal calcification of the aortic valve leaflets.   9. Mild aortic regurgitation.  10. Estimated pulmonary artery systolic pressure is 35 mmHg, consistent with normal pulmonary artery pressure.  11. Echo-free space is noted in the liver consistent with cyst, however, dedicated imaging recommended for further evaluation if clinically indicated.    ________________________________________________________________________________________  FINDINGS:     Left Ventricle:  Left ventricular systolic function is normal with a calculated ejection fraction of 68 % by the Hinton's biplane method of disks. There is normal LV mass and concentric remodeling.     Right Ventricle:  The right ventricular cavity is normal in size and normal systolic function. Tricuspid annular plane systolic excursion (TAPSE) is 2.4 cm (normal >=1.7 cm).     Left Atrium:  The left atrium is normal with an indexed volume of 21.08 ml/m².     Right Atrium:  The right atrium is normal in size.     Aortic Valve:  The aortic valve appears trileaflet with normal systolic excursion. There is focal calcification of the aortic valve leaflets. There is mild aortic regurgitation.     Mitral Valve:  Mitral valve leaflets have focal calcification. There is mild mitral regurgitation.     Tricuspid Valve:  Structurally normal tricuspid valve with normal leaflet excursion. There is mild to moderate tricuspid regurgitation. Estimated pulmonary artery systolic pressure is 35 mmHg, consistent with normal pulmonary artery pressure.     Pulmonic Valve:  The pulmonic valve was not well visualized. There is mild to moderate pulmonic regurgitation.     Aorta:  The aortic root at the sinuses of Valsalva is normal in size, measuring 2.50 cm (indexed 1.75 cm/m²). The ascending aortadiameter is normal in size, measuring 2.30 cm (indexed 1.61 cm/m²).     Extra-cardiac Findings:  Echo-free space is noted in the liver consistent with cyst, however, dedicated imaging recommended for further evaluation if clinically indicated.     Systemic Veins:  The inferior vena cava is normal in size measuring 0.99 cm in diameter, (normal <2.1cm) with normal inspiratory collapse (normal >50%) consistent with normal right atrial pressure (~3, range 0-5mmHg).  ____________________________________________________________________  QUANTITATIVE DATA:  Left Ventricle Measurements: (Indexed to BSA)     IVSd (2D):   1.0 cm  LVPWd (2D):  0.9 cm  LVIDd (2D):  3.5 cm  LVIDs (2D):  2.1 cm  LV Mass:     93 g   64.6 g/m²  LV Vol d, MOD A2C: 31.9 ml 22.27 ml/m²  LV Vol d, MOD A4C: 43.4 ml 30.30 ml/m²  LV Vol d, MOD BP:  37.7 ml 26.29 ml/m²  LV Vol s, MOD A2C: 10.1 ml 7.05 ml/m²  LV Vol s, MOD A4C: 14.6 ml 10.19 ml/m²  LV Vol s, MOD BP:  11.9 ml 8.29 ml/m²  LVOT SV MOD BP:    25.8 ml  LV EF% MOD BP:   68 %     MV E Vmax:    1.00 m/s  MV A Vmax:    1.28 m/s  MV E/A:       0.78  e' lateral:   22.40 cm/s  e' medial:    14.60 cm/s  E/e' lateral: 4.45  E/e' medial:  6.82  E/e' Average: 5.38  MV DT:        278 msec    Aorta Measurements: (normal range) (Indexed to BSA)     Sinuses of Valsalva: 2.50 cm (2.7 - 3.3 cm)  Ao Asc prox:         2.30 cm       Left Atrium Measurements: (Indexed to BSA)  LA Diam 2D: 3.30 cm    Right Ventricle Measurements:     TAPSE:            2.4 cm  RV Base (RVID1):  2.7 cm  RV Mid (RVID2):   2.7 cm  RV Major (RVID3): 6.2 cm       LVOT / RVOT/ Qp/Qs Data: (Indexed to BSA)  LVOT Diameter: 1.50 cm    Mitral Valve Measurements:     MV E Vmax: 1.0 m/s  MV A Vmax: 1.3 m/s  MV E/A:    0.8       Tricuspid Valve Measurements:     TR Vmax:          2.8 m/s  TR Peak Gradient: 31.8 mmHg  RA Pressure:      3 mmHg  PASP:             35 mmHg    ________________________________________________________________________________________  Electronically signed on 1/11/2024 at 5:21:08 PM by Ap Arellano         *** Final ***

## 2025-03-30 NOTE — PROGRESS NOTE ADULT - PROBLEM SELECTOR PLAN 1
89yo F with PMHx of atrial tachycardia, HTN, HLD, and hypothyroidism (not on meds anymore) presents to the ED with urinary frequency x1 day, associated with palpitations  - No leukocytosis, patient afebrile on admission; does not meet sepsis criteria on admission   - EKG: Premature Atrial Tachycardia , QTc 501 on admission   - r/p EKG also atrial tach  - Previous TTE (01/2024) shows: Left ventricular systolic function is normal with an ejection fraction of 68 % by Hinton's method of disks. Mild mitral regurgitation. Mild aortic regurgitation.  - Trops neg x2  - UA negative for UTI; no recent ABx per patient and her daughter   - D-dimer 299 - age-adjusted d-dimer wnl   - S/p hydralazine 10mg PO x1, Lopressor 5mg IV x1, 500cc IV NS bolus x1 in the ED   - c/w Metoprolol tartrate 25 mg Q6H per cardiology (on sotalol at home)  - f/u TTE  - TELE monitoring  - Cardio ([facility]) consulted, recs appreciated 89yo F with PMHx of atrial tachycardia, HTN, HLD, and hypothyroidism (not on meds anymore) presents to the ED with urinary frequency x1 day, associated with palpitations  - No leukocytosis, patient afebrile on admission; does not meet sepsis criteria on admission   - EKG: Premature Atrial Tachycardia , QTc 501 on admission   - r/p EKG also atrial tach  - Previous TTE (01/2024) shows: Left ventricular systolic function is normal with an ejection fraction of 68 % by Hinton's method of disks. Mild mitral regurgitation. Mild aortic regurgitation.  - Trops neg x2  - UA negative for UTI; no recent ABx per patient and her daughter   - D-dimer 299 - age-adjusted d-dimer wnl   - S/p hydralazine 10mg PO x1, Lopressor 5mg IV x1, 500cc IV NS bolus x1 in the ED   - c/w Metoprolol tartrate 50 mg Q6H per cardiology (on sotalol at home)  - f/u TTE  - F/U CTA Chest to r/o PE given continued tachycardia overnight  - TELE monitoring  - Cardio ([facility]) consulted, recs appreciated

## 2025-03-30 NOTE — PROVIDER CONTACT NOTE (OTHER) - REASON
blood culture report results growth in anaerobic bottle staphylococcus hominis and gram positive cocci in clusters
blood culture report growth in aerobic bottle gram positive cocci in clusters, in anaerobic bottle cocci in clusters, coagulase negative staphylococcus detected

## 2025-03-30 NOTE — PROVIDER CONTACT NOTE (OTHER) - SITUATION
blood culture report growth in aerobic bottle gram positive cocci in clusters, in anaerobic bottle cocci in clusters, coagulase negative staphylococcus detected
blood culture report results growth in anaerobic bottle staphylococcus hominis and gram positive cocci in clusters

## 2025-03-30 NOTE — PROGRESS NOTE ADULT - ASSESSMENT
89yo F with PMHx of atrial tachycardia, HTN, HLD, and hypothyroidism (not on meds anymore) presents to the ED with urinary frequency x1 day, associated with palpitations. Patient reports she started experiencing increased urinary frequency last night around midnight without associated dysuria or hematuria; patient states she felt she started getting anxious due to the increased urinary frequency and started feeling palpitations. Admitted for atrial tachycardia.  89yo F with PMHx of atrial tachycardia, HTN, HLD, and hypothyroidism (not on meds anymore) presents to the ED with urinary frequency x1 day, associated with palpitations. Patient reports she started experiencing increased urinary frequency last night around midnight without associated dysuria or hematuria; patient states she felt she started getting anxious due to the increased urinary frequency and started feeling palpitations. Admitted for atrial tachycardia.

## 2025-03-30 NOTE — PROGRESS NOTE ADULT - PROBLEM SELECTOR PLAN 2
- Blood culture showed growth in aerobic bottle: gram positive cocci in clusters. s/p rocephin x1  - Rocephin IV ordered now  - BCx x2  show coagulase negative Staphylococcus hominis  - Monitor fever curve. No leukocytosis.  - ID consulted DR. Tolbert , f/u recs

## 2025-03-30 NOTE — PROGRESS NOTE ADULT - PROBLEM SELECTOR PLAN 6
Patient states she used to be on meds but they were d/c'ed a while ago.  - TSH 2.33  - Recommend outpatient follow up

## 2025-03-30 NOTE — PROGRESS NOTE ADULT - ATTENDING COMMENTS
Patient was seen and examined by myself. Case was discussed with house staff in details. I have reviewed and agree with the plan as outlined above with edits where appropriate.    HPI:  89yo F with PMHx of atrial tachycardia, HTN, HLD, and hypothyroidism (not on meds anymore) presents to the ED with urinary frequency x1 day, associated with palpitations. Patient reports she started experiencing increased urinary frequency last night around midnight without associated dysuria or hematuria; patient states she felt she started getting anxious due to the increased urinary frequency and started feeling palpitations shortly after the urinary symptoms started; denies any associated chest pain/pressure, SOB, orthopnea, leg swelling. Patient endroses Hx of palpitations associated with anxiety in the past, but denies any Hx of Afib/atrial flutter .Per chart review, patient was hospitalized at [facility]  01/06/2024-01/13/2024 for palpitations; her home meds adjusted at that time. At the time, Cardizem, Toprol, and Clonidine were d/c'ed. Patient was started on Sotalol and Lisinopril. Outpatient, patient was started on hydralazine 10mg BID by Cardio Dr. Lugo; patient states she decreased her own dose to 10mg daily as she felt tired taking hydralzine BID; patient states Dr. Lugo aware of this. Patient states she had 3-4 UTIs last year, but reports no UTIs in 2025. Denies any recent ABx use.     Denies fever, chills, cough, abdominal pain, nausea, vomiting, diarrhea, constipation, headaches, changes in vision, dizziness, numbness, tingling.    Denies recent travel or sick contacts.    ED Course:   Vitals: BP: 180/128 HR: 120, Temp: 97.2, RR: 20, SpO2: 97% on RA  Labs:    WBCs 7.27, H/H 13.6/41.0, D-dimer 299, BUN/Cr 22/0.89, Na 137, K 4.1, Mg 2.3, Trop 8.9 --> 12.3, ProBNP 1536  UA: - LE, - nitrite, occasional bacteria   EKG: Premature Atrial Tachycardia   Received in the ED: S/p hydralazine 10mg PO x1, Lopressor 5mg IV x1, 500cc IV NS bolus x1  (28 Mar 2025 12:29)      ROS: as in the HPI; all other ROS negative    SH and family history as above    Vital Signs Last 24 Hrs  T(C): 36.2 (29 Mar 2025 08:00), Max: 37 (28 Mar 2025 17:30)  T(F): 97.2 (29 Mar 2025 08:00), Max: 98.6 (28 Mar 2025 17:30)  HR: 104 (29 Mar 2025 13:01) (86 - 104)  BP: 132/85 (29 Mar 2025 13:01) (119/75 - 162/97)  BP(mean): --  RR: 18 (29 Mar 2025 08:00) (16 - 18)  SpO2: 98% (29 Mar 2025 08:00) (94% - 98%)    Parameters below as of 29 Mar 2025 08:00  Patient On (Oxygen Delivery Method): room air        GEN: NAD  HEENT- normocephalic; mouth moist  CVS- S1S2+  LUNGS- clear to auscultation; no wheezing  ABD: Soft , nontender, nondistended, Bowel sounds are present  EXTREMITY: no calf tenderness, no cyanosis, no edema  NEURO: AAOx3; non focal neurologic exam; grossly non focal neuro exam  PSYCH: normal affect and behavior  BACK: no swelling or mass;   VASCULAR: distal peripheral pulses present  SKIN: warm and dry.       Labs and imaging reviewed      Patient presenting with Patient is a 89y old  Female who presents with a chief complaint of Atrial tachycardia (29 Mar 2025 09:12)   admitted for  1. palpitation: found to have Atrial tachycardia: EKG and telemetry suggest an atrial tachycardia at around 118 bpm, 120 manual count upon physical examination.  H/O atrial tachycardia, was  on sotalol. cardio eval noted, will check Echo, c/w Tele monitor. c/w metoprolol. normal  TSH. denies anxiety. oral intake normal.   plan for EP eval as per cardio     2. HTN :  on lisinopril, metoprolol, hydralazine: will monitor BP /HR.  discussed with cardio, will hold off hydralazine  for now and start norvasc      3.  + blood culture: CoNS. s/p  rocephin, follow up repeat Blood culture and urine culture  , ID eval noted : observe off Abx      4. urinary frequency: Ua negative, will follow Urine culture.         Plan of care discussed with patient ;  all questions and concerns were addressed. Patient was seen and examined by myself. Case was discussed with house staff in details. I have reviewed and agree with the plan as outlined above with edits where appropriate.    HPI:  89yo F with PMHx of atrial tachycardia, HTN, HLD, and hypothyroidism (not on meds anymore) presents to the ED with urinary frequency x1 day, associated with palpitations. Patient reports she started experiencing increased urinary frequency last night around midnight without associated dysuria or hematuria; patient states she felt she started getting anxious due to the increased urinary frequency and started feeling palpitations shortly after the urinary symptoms started; denies any associated chest pain/pressure, SOB, orthopnea, leg swelling. Patient endroses Hx of palpitations associated with anxiety in the past, but denies any Hx of Afib/atrial flutter .Per chart review, patient [facility]  01[facility]-01/13/2024 for palpitations; her home meds adjusted at that time. At the time, Cardizem, Toprol, and Clonidine were d/c'ed. Patient was started on Sotalol and Lisinopril. Outpatient, patient was started on hydralazine 10mg BID by Cardio Dr. Lugo; patient states she decreased her own dose to 10mg daily as she felt tired taking hydralzine BID; patient states Dr. Lugo aware of this. Patient states she had 3-4 UTIs last year, but reports no UTIs in 2025. Denies any recent ABx use.     Denies fever, chills, cough, abdominal pain, nausea, vomiting, diarrhea, constipation, headaches, changes in vision, dizziness, numbness, tingling.    Denies recent travel or sick contacts.    ED Course:   Vitals: BP: 180/128 HR: 120, Temp: 97.2, RR: 20, SpO2: 97% on RA  Labs:    WBCs 7.27, H/H 13.6/41.0, D-dimer 299, BUN/Cr 22/0.89, Na 137, K 4.1, Mg 2.3, Trop 8.9 --> 12.3, ProBNP 1536  UA: - LE, - nitrite, occasional bacteria   EKG: Premature Atrial Tachycardia   Received in the ED: S/p hydralazine 10mg PO x1, Lopressor 5mg IV x1, 500cc IV NS bolus x1  (28 Mar 2025 12:29)      ROS: as in the HPI; all other ROS negative    SH and family history as above    Vital Signs Last 24 Hrs  vital stable, Tele HR         GEN: NAD  HEENT- normocephalic; mouth moist  CVS- S1S2+  LUNGS- clear to auscultation; no wheezing  ABD: Soft , nontender, nondistended, Bowel sounds are present  EXTREMITY: no calf tenderness, no cyanosis, no edema  NEURO: AAOx3; non focal neurologic exam; grossly non focal neuro exam  PSYCH: normal affect and behavior  BACK: no swelling or mass;   VASCULAR: distal peripheral pulses present  SKIN: warm and dry.       Labs and imaging reviewed      Patient presenting with Patient is a 89y old  Female who presents with a chief complaint of Atrial tachycardia (29 Mar 2025 09:12)   admitted for  1. palpitation: found to have Atrial tachycardia: EKG and telemetry suggest an atrial tachycardia at around 118 bpm, 120 manual count upon physical examination.  H/O atrial tachycardia, was  on sotalol. cardio eval noted, will check Echo, c/w Tele monitor. c/w metoprolol. normal  TSH. denies anxiety. oral intake normal.   plan for EP eval as per cardio     2. HTN :  on lisinopril, metoprolol, hydralazine: will monitor BP /HR.  discussed with cardio, will hold off hydralazine  for now and start norvasc      3.  + blood culture: CoNS. s/p  rocephin, follow up repeat Blood culture and urine culture  , ID eval noted : observe off Abx      4. urinary frequency: Ua negative, will follow Urine culture.         Plan of care discussed with patient  and Son Hector on the phone ;  all questions and concerns were addressed.

## 2025-03-30 NOTE — PROGRESS NOTE ADULT - ASSESSMENT
89 yo Female with PMHx of atrial tachycardia, HTN, HLD, Hypothyroidism who presents with a chief complaint of urinary frequency and palpitations.     Atrial tachycardia  - EKG: Premature Atrial Tachycardia , follow up repeat EKG (ordered)  - s/p IV metoprolol 5mg push x1 and PO sotalol 40mg x1.   - Tele: AT 's  - off home sotalol   - continue Metoprolol increase to 25 mg PO Q6hr with hold parameters  - No clear evidence of acute ischemia  - trops negativex2   - no anginal complaints     - No meaningful evidence of volume overload.  - TTE (01/2024) shows: Left ventricular systolic function is normal with an ejection fraction of 68 %   - f/u TTE     - BP labile 110-160's  - continue hydralazine 10 mg PO TID uptitrate as needed for bp control  - continue Lisinopril  - continue monitoring routine hemodynamics.  - Monitor and replete Lytes. Keep K > 4 and Mg > 2    - + Blood cx, management per primary   - Will continue to follow.    SHELDON MontezJEFE  Nurse Practitioner - Cardiology   call TEAMS     89 yo Female with PMHx of atrial tachycardia, HTN, HLD, Hypothyroidism who presents with a chief complaint of urinary frequency and palpitations.     Atrial tachycardia  - EKG: Premature Atrial Tachycardia   - repeat EKG (3/29) unchanged    - s/p IV metoprolol 5mg push x1 and PO sotalol 40mg x1.   - Tele: AT 's  - now off home sotalol   - continue Metoprolol increase to 25 mg PO Q6hr with hold parameters  - would consult EP   - No clear evidence of acute ischemia  - trops negativex2   - no anginal complaints     - No meaningful evidence of volume overload.  - TTE (01/2024) shows: Left ventricular systolic function is normal with an ejection fraction of 68 %   - f/u TTE     - BP labile 110-160's  - continue hydralazine 10 mg PO TID uptitrate as needed for bp control  - continue Lisinopril  - continue monitoring routine hemodynamics.  - Monitor and replete Lytes. Keep K > 4 and Mg > 2    - + Blood cx, w/u in progress    - Will continue to follow.    SHELDON MontezJEFE  Nurse Practitioner - Cardiology   call TEAMS

## 2025-03-30 NOTE — PROGRESS NOTE ADULT - SUBJECTIVE AND OBJECTIVE BOX
[facility]  Gabriel Tolbert MD PhD, Karyn Whitlock MD, Sofia Champion MD, Lisy Arellano MD, Andrew Hu MD  and providing coverage with Ashwin Arreola MD  Providing Infectious Disease Consultations at [facility], [facility], [facility], [facility]'s    Office# [phone] to schedule follow up appointments  Answering Service for urgent calls or New Consults [phone]  Cell# to text for urgent issues Gabriel Tolbert [phone]     infectious diseases progress note:    ROBERT BELLA is a 89y y. o. Female patient    Overnight and events of the last 24hrs reviewed    Allergies    penicillins (Rash)    Intolerances        ANTIBIOTICS/RELEVANT:  antimicrobials    immunologic:    OTHER:  acetaminophen     Tablet .. 650 milliGRAM(s) Oral every 6 hours PRN  amLODIPine   Tablet 5 milliGRAM(s) Oral daily  enoxaparin Injectable 40 milliGRAM(s) SubCutaneous every 24 hours  lisinopril 20 milliGRAM(s) Oral two times a day  metoprolol tartrate 50 milliGRAM(s) Oral every 8 hours  polyethylene glycol 3350 17 Gram(s) Oral daily  senna 2 Tablet(s) Oral at bedtime      Objective:  Vital Signs Last 24 Hrs  T(C): 36.8 (30 Mar 2025 11:29), Max: 36.8 (30 Mar 2025 04:46)  T(F): 98.3 (30 Mar 2025 11:29), Max: 98.3 (30 Mar 2025 04:46)  HR: 108 (30 Mar 2025 13:04) (102 - 111)  BP: 113/79 (30 Mar 2025 13:04) (113/72 - 131/85)  BP(mean): --  RR: 18 (30 Mar 2025 11:29) (17 - 18)  SpO2: 94% (30 Mar 2025 11:29) (93% - 96%)    Parameters below as of 30 Mar 2025 11:29  Patient On (Oxygen Delivery Method): room air        T(C): 36.8 (03-30-25 @ 11:29), Max: 37 (03-28-25 @ 17:30)  T(C): 36.8 (03-30-25 @ 11:29), Max: 37 (03-28-25 @ 17:30)  T(C): 36.8 (03-30-25 @ 11:29), Max: 37 (03-28-25 @ 17:30)    PHYSICAL EXAM:  HEENT: NC atraumatic  Neck: supple  Respiratory: no accessory muscle use, breathing comfortably  Cardiovascular: distant  Gastrointestinal: normal appearing, nondistended  Extremities: no clubbing, no cyanosis,        LABS:                          12.9   8.55  )-----------( 200      ( 30 Mar 2025 07:09 )             39.2       WBC  8.55 03-30 @ 07:09  8.21 03-29 @ 06:55  7.27 03-28 @ 05:35      03-30    137  |  102  |  23  ----------------------------<  109[H]  5.0   |  30  |  0.98    Ca    8.7      30 Mar 2025 07:09  Phos  4.0     03-30  Mg     2.3     03-30    TPro  6.4  /  Alb  3.0[L]  /  TBili  0.4  /  DBili  x   /  AST  14[L]  /  ALT  16  /  AlkPhos  71  03-30      Creatinine: 0.98 mg/dL (03-30-25 @ 07:09)  Creatinine: 0.81 mg/dL (03-29-25 @ 06:55)  Creatinine: 0.89 mg/dL (03-28-25 @ 05:35)        Urinalysis Basic - ( 30 Mar 2025 07:09 )    Color: x / Appearance: x / SG: x / pH: x  Gluc: 109 mg/dL / Ketone: x  / Bili: x / Urobili: x   Blood: x / Protein: x / Nitrite: x   Leuk Esterase: x / RBC: x / WBC x   Sq Epi: x / Non Sq Epi: x / Bacteria: x            INFLAMMATORY MARKERS      MICROBIOLOGY:    Culture - Blood (03.29.25 @ 07:00)    Specimen Source: Blood Blood-Peripheral   Culture Results:   No growth at 24 hours        RADIOLOGY & ADDITIONAL STUDIES:

## 2025-03-31 LAB
ALBUMIN SERPL ELPH-MCNC: 2.9 G/DL — LOW (ref 3.3–5)
ALP SERPL-CCNC: 74 U/L — SIGNIFICANT CHANGE UP (ref 40–120)
ALT FLD-CCNC: 28 U/L — SIGNIFICANT CHANGE UP (ref 12–78)
ANION GAP SERPL CALC-SCNC: 6 MMOL/L — SIGNIFICANT CHANGE UP (ref 5–17)
AST SERPL-CCNC: 27 U/L — SIGNIFICANT CHANGE UP (ref 15–37)
BASOPHILS # BLD AUTO: 0.02 K/UL — SIGNIFICANT CHANGE UP (ref 0–0.2)
BASOPHILS NFR BLD AUTO: 0.2 % — SIGNIFICANT CHANGE UP (ref 0–2)
BILIRUB SERPL-MCNC: 0.7 MG/DL — SIGNIFICANT CHANGE UP (ref 0.2–1.2)
BUN SERPL-MCNC: 19 MG/DL — SIGNIFICANT CHANGE UP (ref 7–23)
CALCIUM SERPL-MCNC: 8.9 MG/DL — SIGNIFICANT CHANGE UP (ref 8.5–10.1)
CHLORIDE SERPL-SCNC: 102 MMOL/L — SIGNIFICANT CHANGE UP (ref 96–108)
CO2 SERPL-SCNC: 26 MMOL/L — SIGNIFICANT CHANGE UP (ref 22–31)
CREAT SERPL-MCNC: 1 MG/DL — SIGNIFICANT CHANGE UP (ref 0.5–1.3)
EGFR: 52 ML/MIN/1.73M2 — LOW
EGFR: 52 ML/MIN/1.73M2 — LOW
EOSINOPHIL # BLD AUTO: 0.14 K/UL — SIGNIFICANT CHANGE UP (ref 0–0.5)
EOSINOPHIL NFR BLD AUTO: 1.6 % — SIGNIFICANT CHANGE UP (ref 0–6)
GLUCOSE SERPL-MCNC: 119 MG/DL — HIGH (ref 70–99)
HCT VFR BLD CALC: 38.7 % — SIGNIFICANT CHANGE UP (ref 34.5–45)
HGB BLD-MCNC: 12.7 G/DL — SIGNIFICANT CHANGE UP (ref 11.5–15.5)
IMM GRANULOCYTES NFR BLD AUTO: 0.8 % — SIGNIFICANT CHANGE UP (ref 0–0.9)
LYMPHOCYTES # BLD AUTO: 0.88 K/UL — LOW (ref 1–3.3)
LYMPHOCYTES # BLD AUTO: 9.9 % — LOW (ref 13–44)
MAGNESIUM SERPL-MCNC: 2.3 MG/DL — SIGNIFICANT CHANGE UP (ref 1.6–2.6)
MCHC RBC-ENTMCNC: 29.5 PG — SIGNIFICANT CHANGE UP (ref 27–34)
MCHC RBC-ENTMCNC: 32.8 G/DL — SIGNIFICANT CHANGE UP (ref 32–36)
MCV RBC AUTO: 89.8 FL — SIGNIFICANT CHANGE UP (ref 80–100)
MONOCYTES # BLD AUTO: 1.8 K/UL — HIGH (ref 0–0.9)
MONOCYTES NFR BLD AUTO: 20.3 % — HIGH (ref 2–14)
NEUTROPHILS # BLD AUTO: 5.94 K/UL — SIGNIFICANT CHANGE UP (ref 1.8–7.4)
NEUTROPHILS NFR BLD AUTO: 67.2 % — SIGNIFICANT CHANGE UP (ref 43–77)
NRBC BLD AUTO-RTO: 0 /100 WBCS — SIGNIFICANT CHANGE UP (ref 0–0)
PHOSPHATE SERPL-MCNC: 3.4 MG/DL — SIGNIFICANT CHANGE UP (ref 2.5–4.5)
PLATELET # BLD AUTO: 219 K/UL — SIGNIFICANT CHANGE UP (ref 150–400)
POTASSIUM SERPL-MCNC: 4.4 MMOL/L — SIGNIFICANT CHANGE UP (ref 3.5–5.3)
POTASSIUM SERPL-SCNC: 4.4 MMOL/L — SIGNIFICANT CHANGE UP (ref 3.5–5.3)
PROT SERPL-MCNC: 7 G/DL — SIGNIFICANT CHANGE UP (ref 6–8.3)
RBC # BLD: 4.31 M/UL — SIGNIFICANT CHANGE UP (ref 3.8–5.2)
RBC # FLD: 14.4 % — SIGNIFICANT CHANGE UP (ref 10.3–14.5)
SODIUM SERPL-SCNC: 134 MMOL/L — LOW (ref 135–145)
WBC # BLD: 8.85 K/UL — SIGNIFICANT CHANGE UP (ref 3.8–10.5)
WBC # FLD AUTO: 8.85 K/UL — SIGNIFICANT CHANGE UP (ref 3.8–10.5)

## 2025-03-31 PROCEDURE — 99233 SBSQ HOSP IP/OBS HIGH 50: CPT

## 2025-03-31 PROCEDURE — 99233 SBSQ HOSP IP/OBS HIGH 50: CPT | Mod: GC

## 2025-03-31 RX ORDER — ENOXAPARIN SODIUM 100 MG/ML
30 INJECTION SUBCUTANEOUS EVERY 24 HOURS
Refills: 0 | Status: DISCONTINUED | OUTPATIENT
Start: 2025-03-31 | End: 2025-04-01

## 2025-03-31 RX ORDER — FLECAINIDE ACETATE 50 MG
50 TABLET ORAL EVERY 12 HOURS
Refills: 0 | Status: DISCONTINUED | OUTPATIENT
Start: 2025-03-31 | End: 2025-04-01

## 2025-03-31 RX ADMIN — LISINOPRIL 20 MILLIGRAM(S): 5 TABLET ORAL at 18:13

## 2025-03-31 RX ADMIN — Medication 650 MILLIGRAM(S): at 14:27

## 2025-03-31 RX ADMIN — Medication 2 TABLET(S): at 21:16

## 2025-03-31 RX ADMIN — Medication 650 MILLIGRAM(S): at 16:16

## 2025-03-31 RX ADMIN — AMLODIPINE BESYLATE 5 MILLIGRAM(S): 10 TABLET ORAL at 05:58

## 2025-03-31 RX ADMIN — Medication 50 MILLIGRAM(S): at 18:12

## 2025-03-31 RX ADMIN — METOPROLOL SUCCINATE 50 MILLIGRAM(S): 50 TABLET, EXTENDED RELEASE ORAL at 11:49

## 2025-03-31 RX ADMIN — METOPROLOL SUCCINATE 50 MILLIGRAM(S): 50 TABLET, EXTENDED RELEASE ORAL at 21:16

## 2025-03-31 RX ADMIN — LISINOPRIL 20 MILLIGRAM(S): 5 TABLET ORAL at 05:58

## 2025-03-31 RX ADMIN — ENOXAPARIN SODIUM 30 MILLIGRAM(S): 100 INJECTION SUBCUTANEOUS at 18:13

## 2025-03-31 RX ADMIN — POLYETHYLENE GLYCOL 3350 17 GRAM(S): 17 POWDER, FOR SOLUTION ORAL at 11:49

## 2025-03-31 RX ADMIN — METOPROLOL SUCCINATE 50 MILLIGRAM(S): 50 TABLET, EXTENDED RELEASE ORAL at 05:58

## 2025-03-31 NOTE — CONSULT NOTE ADULT - SUBJECTIVE AND OBJECTIVE BOX
CHIEF COMPLAINT: palpitations    HISTORY OF PRESENT ILLNESS: 89 yr old female with PMHx of atrial tachycardia, HTN, HLD, Hypothyroidism, cataracts, recurrent UTI's, who presented to [facility] ED after experiencing urinary urgency. Patient reports feeling extremely anxious and then felt mild palpitations. Patient now reports feeling symptom free at this time. Patient had previous admission to [facility] (01/2024) with urinary frequency and palpitations. Patient was given IV metoprolol and sotolol 40 mg po in ED. TSH is WNL; Echocardiogram shows normal LV systolic function with EF 68%.      Sotalol has been stopped and patient is now on Metoprolol 25 mg po Q 6 hrs, Hydralazine 10 mg po TID, and Lisinopril 20 mg po QD. Overnight telemetry shows NSR 90's to low 100's with APC's and blocked APC's.          PAST MEDICAL & SURGICAL HISTORY:  HTN - Hypertension      Hypercholesteremia      Atrial tachycardia      Hypothyroid      S/P Cataract Surgery  B/L      Liver cyst  removed          [ ] Diabetes   [X ] Hypertension  [X ] Hyperlipidemia  [ ] CAD  [ ] PCI  [ ] CABG    PREVIOUS DIAGNOSTIC TESTING:    [X ] Echocardiogram:  [ ]  Catheterization:  [ ] Stress Test:  	    MEDICATIONS:  amLODIPine   Tablet 5 milliGRAM(s) Oral daily  enoxaparin Injectable 30 milliGRAM(s) SubCutaneous every 24 hours  flecainide 50 milliGRAM(s) Oral every 12 hours  lisinopril 20 milliGRAM(s) Oral two times a day  metoprolol tartrate 50 milliGRAM(s) Oral every 8 hours        acetaminophen     Tablet .. 650 milliGRAM(s) Oral every 6 hours PRN    polyethylene glycol 3350 17 Gram(s) Oral daily  senna 2 Tablet(s) Oral at bedtime          FAMILY HISTORY:  FH: HTN (hypertension) (Mother)    FH: type 2 diabetes mellitus (Sibling)        SOCIAL HISTORY:    [ ] Non-smoker  [ ] Smoker  [ ] Alcohol    Allergies    penicillins (Rash)    Intolerances    	    REVIEW OF SYSTEMS:  CONSTITUTIONAL: No fever, weight loss, or fatigue  EYES: No eye pain, visual disturbances, or discharge  ENMT:  No difficulty hearing, tinnitus, vertigo; No sinus or throat pain  NECK: No pain or stiffness  RESPIRATORY: No cough, wheezing, chills or hemoptysis; No Shortness of Breath  CARDIOVASCULAR: No chest pain, palpitations, passing out, dizziness, or leg swelling  GASTROINTESTINAL: No abdominal or epigastric pain. No nausea, vomiting, or hematemesis; No diarrhea or constipation. No melena or hematochezia.  GENITOURINARY: No dysuria, frequency, hematuria, or incontinence  NEUROLOGICAL: No headaches, memory loss, loss of strength, numbness, or tremors  SKIN: No itching, burning, rashes, or lesions   LYMPH Nodes: No enlarged glands  ENDOCRINE: No heat or cold intolerance; No hair loss  MUSCULOSKELETAL: No joint pain or swelling; No muscle, back, or extremity pain  PSYCHIATRIC: No depression, anxiety, mood swings, or difficulty sleeping  HEME/LYMPH: No easy bruising, or bleeding gums  ALLERY AND IMMUNOLOGIC: No hives or eczema	    [ ] All others negative	  [ ] Unable to obtain    PHYSICAL EXAM:  T(C): 36.6 (03-31-25 @ 11:44), Max: 37.2 (03-30-25 @ 20:39)  HR: 113 (03-31-25 @ 11:44) (108 - 117)  BP: 112/73 (03-31-25 @ 11:44) (112/68 - 135/82)  RR: 18 (03-31-25 @ 11:44) (17 - 18)  SpO2: 93% (03-31-25 @ 11:44) (93% - 95%)  Wt(kg): --  I&O's Summary    30 Mar 2025 07:01  -  31 Mar 2025 07:00  --------------------------------------------------------  IN: 850 mL / OUT: 700 mL / NET: 150 mL        Appearance: Normal	  HEENT:   Normal oral mucosa, PERRL, EOMI	  Lymphatic: No lymphadenopathy  Cardiovascular: Normal S1 S2, No JVD, No murmurs, No edema  Respiratory: Lungs clear to auscultation	  Psychiatry: A & O x 3, Mood & affect appropriate  Gastrointestinal:  Soft, Non-tender, + BS	  Skin: No rashes, No ecchymoses, No cyanosis	  Neurologic: Non-focal  Extremities: Normal range of motion, No clubbing, cyanosis or edema  Vascular: Peripheral pulses palpable 2+ bilaterally    TELEMETRY: 	    ECG:  	  RADIOLOGY:  OTHER: 	  	  LABS:	 	    CARDIAC MARKERS:                                  12.7   8.85  )-----------( 219      ( 31 Mar 2025 08:16 )             38.7     03-31    134[L]  |  102  |  19  ----------------------------<  119[H]  4.4   |  26  |  1.00    Ca    8.9      31 Mar 2025 08:16  Phos  3.4     03-31  Mg     2.3     03-31    TPro  7.0  /  Alb  2.9[L]  /  TBili  0.7  /  DBili  x   /  AST  27  /  ALT  28  /  AlkPhos  74  03-31    proBNP:   Lipid Profile:   HgA1c:   TSH:     ASSESSMENT/PLAN: 	         CHIEF COMPLAINT: palpitations    HISTORY OF PRESENT ILLNESS: 89 yr old female with PMHx of atrial tachycardia, HTN, HLD, Hypothyroidism, cataracts, recurrent UTI's, who presented to [place] ED after experiencing urinary urgency. Patient reports feeling extremely anxious and then felt mild palpitations. Patient now reports feeling symptom free at this time. Patient had previous admission to [facility] (01/2024) with urinary frequency and palpitations. Patient was given IV metoprolol and sotolol 40 mg po in ED. TSH is WNL; Echocardiogram shows normal LV systolic function with EF 68%.      Sotalol has been stopped and patient is now on Metoprolol 25 mg po Q 6 hrs, Hydralazine 10 mg po TID, and Lisinopril 20 mg po QD. Overnight telemetry shows NSR 90's to low 100's with APC's and blocked APC's.          PAST MEDICAL & SURGICAL HISTORY:  HTN - Hypertension      Hypercholesteremia      Atrial tachycardia      Hypothyroid      S/P Cataract Surgery  B/L      Liver cyst  removed          [ ] Diabetes   [X ] Hypertension  [X ] Hyperlipidemia  [ ] CAD  [ ] PCI  [ ] CABG    PREVIOUS DIAGNOSTIC TESTING:    [X ] Echocardiogram:  [ ]  Catheterization:  [ ] Stress Test:      Echocardiogram: 01/11/2024 MICHELLE	          MEDICATIONS:  amLODIPine   Tablet 5 milliGRAM(s) Oral daily  enoxaparin Injectable 30 milliGRAM(s) SubCutaneous every 24 hours  flecainide 50 milliGRAM(s) Oral every 12 hours  lisinopril 20 milliGRAM(s) Oral two times a day  metoprolol tartrate 50 milliGRAM(s) Oral every 8 hours        acetaminophen     Tablet .. 650 milliGRAM(s) Oral every 6 hours PRN    polyethylene glycol 3350 17 Gram(s) Oral daily  senna 2 Tablet(s) Oral at bedtime          FAMILY HISTORY:  FH: HTN (hypertension) (Mother)    FH: type 2 diabetes mellitus (Sibling)        SOCIAL HISTORY:    [ ] Non-smoker  [ ] Smoker  [ ] Alcohol    Allergies    penicillins (Rash)    Intolerances    	    REVIEW OF SYSTEMS:  CONSTITUTIONAL: No fever, weight loss, or fatigue  EYES: No eye pain, visual disturbances, or discharge  ENMT:  No difficulty hearing, tinnitus, vertigo; No sinus or throat pain  NECK: No pain or stiffness  RESPIRATORY: No cough, wheezing, chills or hemoptysis; No Shortness of Breath  CARDIOVASCULAR: No chest pain, palpitations, passing out, dizziness, or leg swelling  GASTROINTESTINAL: No abdominal or epigastric pain. No nausea, vomiting, or hematemesis; No diarrhea or constipation. No melena or hematochezia.  GENITOURINARY: No dysuria, frequency, hematuria, or incontinence  NEUROLOGICAL: No headaches, memory loss, loss of strength, numbness, or tremors  SKIN: No itching, burning, rashes, or lesions   LYMPH Nodes: No enlarged glands  ENDOCRINE: No heat or cold intolerance; No hair loss  MUSCULOSKELETAL: No joint pain or swelling; No muscle, back, or extremity pain  PSYCHIATRIC: No depression, anxiety, mood swings, or difficulty sleeping  HEME/LYMPH: No easy bruising, or bleeding gums  ALLERY AND IMMUNOLOGIC: No hives or eczema	    [ ] All others negative	  [ ] Unable to obtain    PHYSICAL EXAM:  T(C): 36.6 (03-31-25 @ 11:44), Max: 37.2 (03-30-25 @ 20:39)  HR: 113 (03-31-25 @ 11:44) (108 - 117)  BP: 112/73 (03-31-25 @ 11:44) (112/68 - 135/82)  RR: 18 (03-31-25 @ 11:44) (17 - 18)  SpO2: 93% (03-31-25 @ 11:44) (93% - 95%)  Wt(kg): --  I&O's Summary    30 Mar 2025 07:01  -  31 Mar 2025 07:00  --------------------------------------------------------  IN: 850 mL / OUT: 700 mL / NET: 150 mL        Appearance: Normal	  HEENT:   Normal oral mucosa, PERRL, EOMI	  Lymphatic: No lymphadenopathy  Cardiovascular: Normal S1 S2, No JVD, No murmurs, No edema  Respiratory: Lungs clear to auscultation	  Psychiatry: A & O x 3, Mood & affect appropriate  Gastrointestinal:  Soft, Non-tender, + BS	  Skin: No rashes, No ecchymoses, No cyanosis	  Neurologic: Non-focal  Extremities: Normal range of motion, No clubbing, cyanosis or edema  Vascular: Peripheral pulses palpable 2+ bilaterally    TELEMETRY: 	    ECG:  	  RADIOLOGY:  OTHER: 	  	  LABS:	 	    CARDIAC MARKERS:                                  12.7   8.85  )-----------( 219      ( 31 Mar 2025 08:16 )             38.7     03-31    134[L]  |  102  |  19  ----------------------------<  119[H]  4.4   |  26  |  1.00    Ca    8.9      31 Mar 2025 08:16  Phos  3.4     03-31  Mg     2.3     03-31    TPro  7.0  /  Alb  2.9[L]  /  TBili  0.7  /  DBili  x   /  AST  27  /  ALT  28  /  AlkPhos  74  03-31    proBNP:   Lipid Profile:   HgA1c:   TSH:     ASSESSMENT/PLAN: 	         CHIEF COMPLAINT: palpitations    HISTORY OF PRESENT ILLNESS: 89 yr old female with PMHx of atrial tachycardia, HTN, HLD, Hypothyroidism, cataracts, recurrent UTI's, who presented to [place] ED after experiencing urinary urgency. Patient reports feeling extremely anxious and then felt mild palpitations. Patient now reports feeling symptom free at this time. Patient had previous admission to [facility] (01/2024) with urinary frequency and palpitations. Patient was given IV metoprolol and sotolol 40 mg po in ED. TSH is WNL; Echocardiogram shows normal LV systolic function with EF 68%.      Sotalol has been stopped and patient is now on Metoprolol 25 mg po Q 6 hrs, Hydralazine 10 mg po TID, and Lisinopril 20 mg po QD. Overnight telemetry shows NSR 90's to low 100's with APC's and blocked APC's.          PAST MEDICAL & SURGICAL HISTORY:  HTN - Hypertension      Hypercholesteremia      Atrial tachycardia      Hypothyroid      S/P Cataract Surgery  B/L      Liver cyst  removed          [ ] Diabetes   [X ] Hypertension  [X ] Hyperlipidemia  [ ] CAD  [ ] PCI  [ ] CABG    PREVIOUS DIAGNOSTIC TESTING:    [X ] Echocardiogram:  [ ]  Catheterization:  [ ] Stress Test:      Echocardiogram: 03/30/2025:  	          MEDICATIONS:  amLODIPine   Tablet 5 milliGRAM(s) Oral daily  enoxaparin Injectable 30 milliGRAM(s) SubCutaneous every 24 hours  flecainide 50 milliGRAM(s) Oral every 12 hours  lisinopril 20 milliGRAM(s) Oral two times a day  metoprolol tartrate 50 milliGRAM(s) Oral every 8 hours        acetaminophen     Tablet .. 650 milliGRAM(s) Oral every 6 hours PRN    polyethylene glycol 3350 17 Gram(s) Oral daily  senna 2 Tablet(s) Oral at bedtime          FAMILY HISTORY:  FH: HTN (hypertension) (Mother)    FH: type 2 diabetes mellitus (Sibling)        SOCIAL HISTORY:    [ ] Non-smoker  [ ] Smoker  [ ] Alcohol    Allergies    penicillins (Rash)    Intolerances    	    REVIEW OF SYSTEMS:  CONSTITUTIONAL: No fever, weight loss, or fatigue  EYES: No eye pain, visual disturbances, or discharge  ENMT:  No difficulty hearing, tinnitus, vertigo; No sinus or throat pain  NECK: No pain or stiffness  RESPIRATORY: No cough, wheezing, chills or hemoptysis; No Shortness of Breath  CARDIOVASCULAR: No chest pain, palpitations, passing out, dizziness, or leg swelling  GASTROINTESTINAL: No abdominal or epigastric pain. No nausea, vomiting, or hematemesis; No diarrhea or constipation. No melena or hematochezia.  GENITOURINARY: No dysuria, frequency, hematuria, or incontinence  NEUROLOGICAL: No headaches, memory loss, loss of strength, numbness, or tremors  SKIN: No itching, burning, rashes, or lesions   LYMPH Nodes: No enlarged glands  ENDOCRINE: No heat or cold intolerance; No hair loss  MUSCULOSKELETAL: No joint pain or swelling; No muscle, back, or extremity pain  PSYCHIATRIC: No depression, anxiety, mood swings, or difficulty sleeping  HEME/LYMPH: No easy bruising, or bleeding gums  ALLERY AND IMMUNOLOGIC: No hives or eczema	    [ ] All others negative	  [ ] Unable to obtain    PHYSICAL EXAM:  T(C): 36.6 (03-31-25 @ 11:44), Max: 37.2 (03-30-25 @ 20:39)  HR: 113 (03-31-25 @ 11:44) (108 - 117)  BP: 112/73 (03-31-25 @ 11:44) (112/68 - 135/82)  RR: 18 (03-31-25 @ 11:44) (17 - 18)  SpO2: 93% (03-31-25 @ 11:44) (93% - 95%)  Wt(kg): --  I&O's Summary    30 Mar 2025 07:01  -  31 Mar 2025 07:00  --------------------------------------------------------  IN: 850 mL / OUT: 700 mL / NET: 150 mL        Appearance: Normal	  HEENT:   Normal oral mucosa, PERRL, EOMI	  Lymphatic: No lymphadenopathy  Cardiovascular: Normal S1 S2, No JVD, No murmurs, No edema  Respiratory: Lungs clear to auscultation	  Psychiatry: A & O x 3, Mood & affect appropriate  Gastrointestinal:  Soft, Non-tender, + BS	  Skin: No rashes, No ecchymoses, No cyanosis	  Neurologic: Non-focal  Extremities: Normal range of motion, No clubbing, cyanosis or edema  Vascular: Peripheral pulses palpable 2+ bilaterally    TELEMETRY: 	    ECG:  	  RADIOLOGY:  OTHER: 	  	  LABS:	 	    CARDIAC MARKERS:                                  12.7   8.85  )-----------( 219      ( 31 Mar 2025 08:16 )             38.7     03-31    134[L]  |  102  |  19  ----------------------------<  119[H]  4.4   |  26  |  1.00    Ca    8.9      31 Mar 2025 08:16  Phos  3.4     03-31  Mg     2.3     03-31    TPro  7.0  /  Alb  2.9[L]  /  TBili  0.7  /  DBili  x   /  AST  27  /  ALT  28  /  AlkPhos  74  03-31    proBNP:   Lipid Profile:   HgA1c:   TSH:     ASSESSMENT/PLAN: 	         CHIEF COMPLAINT: palpitations    HISTORY OF PRESENT ILLNESS: 89 yr old female with PMHx of atrial tachycardia, HTN, HLD, Hypothyroidism, cataracts, recurrent UTI's, who presented to [place] ED after experiencing urinary urgency. Patient reports feeling extremely anxious and then felt mild palpitations. Patient now reports feeling symptom free at this time. Patient had previous admission to [facility] (01/2024) with urinary frequency and palpitations. Patient was given IV metoprolol and sotolol 40 mg po in ED. TSH is WNL; Echocardiogram shows normal LV systolic function with EF 68%. EKG shows atrial tachycardia      Sotalol has been stopped and patient is now on Metoprolol 25 mg po Q 6 hrs, Hydralazine 10 mg po TID, and Lisinopril 20 mg po QD. Overnight telemetry shows NSR 90's to low 100's with APC's and blocked APC's.          PAST MEDICAL & SURGICAL HISTORY:  HTN - Hypertension      Hypercholesteremia      Atrial tachycardia      Hypothyroid      S/P Cataract Surgery  B/L      Liver cyst  removed          [ ] Diabetes   [X ] Hypertension  [X ] Hyperlipidemia  [ ] CAD  [ ] PCI  [ ] CABG    PREVIOUS DIAGNOSTIC TESTING:    [X ] Echocardiogram:  [ ]  Catheterization:  [ ] Stress Test:      Echocardiogram: 03/30/2025:  LV systolic function normal with EF 64$; Normal RV cavity size and normal RV systolic function.   Trileaflet aortic valve with normal systolic function. Trace MR/TR.   	          MEDICATIONS:  amLODIPine   Tablet 5 milliGRAM(s) Oral daily  enoxaparin Injectable 30 milliGRAM(s) SubCutaneous every 24 hours  flecainide 50 milliGRAM(s) Oral every 12 hours  lisinopril 20 milliGRAM(s) Oral two times a day  metoprolol tartrate 50 milliGRAM(s) Oral every 8 hours        acetaminophen     Tablet .. 650 milliGRAM(s) Oral every 6 hours PRN    polyethylene glycol 3350 17 Gram(s) Oral daily  senna 2 Tablet(s) Oral at bedtime          FAMILY HISTORY:  FH: HTN (hypertension) (Mother)    FH: type 2 diabetes mellitus (Sibling)        SOCIAL HISTORY:    [X ] Non-smoker  [ ] Smoker  [ ] Alcohol    Allergies    penicillins (Rash)    Intolerances    	    REVIEW OF SYSTEMS:  CONSTITUTIONAL: No fever, weight loss, or fatigue  EYES: No eye pain, visual disturbances, or discharge  ENMT:  No difficulty hearing, tinnitus, vertigo; No sinus or throat pain  NECK: No pain or stiffness  RESPIRATORY: No cough, wheezing, chills or hemoptysis; No Shortness of Breath  CARDIOVASCULAR: No chest pain, + palpitations, no passing out, dizziness, or leg swelling  GASTROINTESTINAL: No abdominal or epigastric pain. No nausea, vomiting, or hematemesis; No diarrhea or constipation. No melena or hematochezia.  GENITOURINARY: No dysuria, frequency, hematuria, or incontinence  NEUROLOGICAL: No headaches, memory loss, loss of strength, numbness, or tremors  SKIN: No itching, burning, rashes, or lesions   LYMPH Nodes: No enlarged glands  ENDOCRINE: No heat or cold intolerance; No hair loss  MUSCULOSKELETAL: No joint pain or swelling; No muscle, back, or extremity pain  PSYCHIATRIC: No depression, anxiety, mood swings, or difficulty sleeping  HEME/LYMPH: No easy bruising, or bleeding gums  ALLERY AND IMMUNOLOGIC: No hives or eczema	    [X ] All others negative	  [ ] Unable to obtain    PHYSICAL EXAM:  T(C): 36.6 (03-31-25 @ 11:44), Max: 37.2 (03-30-25 @ 20:39)  HR: 113 (03-31-25 @ 11:44) (108 - 117)  BP: 112/73 (03-31-25 @ 11:44) (112/68 - 135/82)  RR: 18 (03-31-25 @ 11:44) (17 - 18)  SpO2: 93% (03-31-25 @ 11:44) (93% - 95%)  Wt(kg): --  I&O's Summary    30 Mar 2025 07:01  -  31 Mar 2025 07:00  --------------------------------------------------------  IN: 850 mL / OUT: 700 mL / NET: 150 mL        Appearance: Normal	  HEENT:   Normal oral mucosa, PERRL, EOMI	  Lymphatic: No lymphadenopathy  Cardiovascular: Normal S1 S2, No JVD, No murmurs, No edema  Respiratory: Lungs clear to auscultation	  Psychiatry: A & O x 3, Mood & affect appropriate  Gastrointestinal:  Soft, Non-tender, + BS	  Skin: No rashes, No ecchymoses, No cyanosis	  Neurologic: Non-focal  Extremities: Normal range of motion, No clubbing, cyanosis or edema  Vascular: Peripheral pulses palpable 2+ bilaterally    TELEMETRY: NSR to atrial tachycardia 90's to low 100's' APC's and blocked APC's.     ECG:  	  RADIOLOGY:  OTHER: 	  	  LABS:	 	    CARDIAC MARKERS:                                  12.7   8.85  )-----------( 219      ( 31 Mar 2025 08:16 )             38.7     03-31    134[L]  |  102  |  19  ----------------------------<  119[H]  4.4   |  26  |  1.00    Ca    8.9      31 Mar 2025 08:16  Phos  3.4     03-31  Mg     2.3     03-31    TPro  7.0  /  Alb  2.9[L]  /  TBili  0.7  /  DBili  x   /  AST  27  /  ALT  28  /  AlkPhos  74  03-31    proBNP:   Lipid Profile:   HgA1c:   TSH: WNL             CHIEF COMPLAINT: palpitations    HISTORY OF PRESENT ILLNESS: 89 yr old female with PMHx of atrial tachycardia, HTN, HLD, Hypothyroidism, cataracts, recurrent UTI's, who presented to [place] ED after experiencing urinary urgency. Patient reports feeling extremely anxious and then felt mild palpitations. Patient now reports feeling symptom free at this time. Patient had previous admission to [facility] (01/2024) with urinary frequency and palpitations. Patient was given IV metoprolol and sotolol 40 mg po in ED. TSH is WNL; Echocardiogram shows normal LV systolic function with EF 68%. EKG shows atrial tachycardia      Sotalol has been stopped and patient is now on Metoprolol 50 mg po Q 8 hrs, Hydralazine has been stopped, and Lisinopril 20 mg po QD. Overnight telemetry shows NSR 90's to low 100's with APC's and blocked APC's.          PAST MEDICAL & SURGICAL HISTORY:  HTN - Hypertension      Hypercholesteremia      Atrial tachycardia      Hypothyroid      S/P Cataract Surgery  B/L      Liver cyst  removed          [ ] Diabetes   [X ] Hypertension  [X ] Hyperlipidemia  [ ] CAD  [ ] PCI  [ ] CABG    PREVIOUS DIAGNOSTIC TESTING:    [X ] Echocardiogram:  [ ]  Catheterization:  [ ] Stress Test:      Echocardiogram: 03/30/2025:  LV systolic function normal with EF 64%; Normal RV cavity size and normal RV systolic function.   Trileaflet aortic valve with normal systolic function. Trace MR/TR.   	          MEDICATIONS:  amLODIPine   Tablet 5 milliGRAM(s) Oral daily  enoxaparin Injectable 30 milliGRAM(s) SubCutaneous every 24 hours  flecainide 50 milliGRAM(s) Oral every 12 hours  lisinopril 20 milliGRAM(s) Oral two times a day  metoprolol tartrate 50 milliGRAM(s) Oral every 8 hours        acetaminophen     Tablet .. 650 milliGRAM(s) Oral every 6 hours PRN    polyethylene glycol 3350 17 Gram(s) Oral daily  senna 2 Tablet(s) Oral at bedtime          FAMILY HISTORY:  FH: HTN (hypertension) (Mother)    FH: type 2 diabetes mellitus (Sibling)        SOCIAL HISTORY:    [X ] Non-smoker  [ ] Smoker  [ ] Alcohol    Allergies    penicillins (Rash)    Intolerances    	    REVIEW OF SYSTEMS:  CONSTITUTIONAL: No fever, weight loss, or fatigue  EYES: No eye pain, visual disturbances, or discharge  ENMT:  No difficulty hearing, tinnitus, vertigo; No sinus or throat pain  NECK: No pain or stiffness  RESPIRATORY: No cough, wheezing, chills or hemoptysis; No Shortness of Breath  CARDIOVASCULAR: No chest pain, + palpitations, no passing out, dizziness, or leg swelling  GASTROINTESTINAL: No abdominal or epigastric pain. No nausea, vomiting, or hematemesis; No diarrhea or constipation. No melena or hematochezia.  GENITOURINARY: No dysuria, frequency, hematuria, or incontinence  NEUROLOGICAL: No headaches, memory loss, loss of strength, numbness, or tremors  SKIN: No itching, burning, rashes, or lesions   LYMPH Nodes: No enlarged glands  ENDOCRINE: No heat or cold intolerance; No hair loss  MUSCULOSKELETAL: No joint pain or swelling; No muscle, back, or extremity pain  PSYCHIATRIC: No depression, anxiety, mood swings, or difficulty sleeping  HEME/LYMPH: No easy bruising, or bleeding gums  ALLERY AND IMMUNOLOGIC: No hives or eczema	    [X ] All others negative	  [ ] Unable to obtain    PHYSICAL EXAM:  T(C): 36.6 (03-31-25 @ 11:44), Max: 37.2 (03-30-25 @ 20:39)  HR: 113 (03-31-25 @ 11:44) (108 - 117)  BP: 112/73 (03-31-25 @ 11:44) (112/68 - 135/82)  RR: 18 (03-31-25 @ 11:44) (17 - 18)  SpO2: 93% (03-31-25 @ 11:44) (93% - 95%)  Wt(kg): --  I&O's Summary    30 Mar 2025 07:01  -  31 Mar 2025 07:00  --------------------------------------------------------  IN: 850 mL / OUT: 700 mL / NET: 150 mL        Appearance: Normal	  HEENT:   Normal oral mucosa, PERRL, EOMI	  Lymphatic: No lymphadenopathy  Cardiovascular: Normal S1 S2, No JVD, No murmurs, No edema  Respiratory: Lungs clear to auscultation	  Psychiatry: A & O x 3, Mood & affect appropriate  Gastrointestinal:  Soft, Non-tender, + BS	  Skin: No rashes, No ecchymoses, No cyanosis	  Neurologic: Non-focal  Extremities: Normal range of motion, No clubbing, cyanosis or edema  Vascular: Peripheral pulses palpable 2+ bilaterally    TELEMETRY: NSR to atrial tachycardia 90's to low 100's' APC's and blocked APC's.     ECG:  	  RADIOLOGY:  OTHER: 	  	  LABS:	 	    CARDIAC MARKERS:                                  12.7   8.85  )-----------( 219      ( 31 Mar 2025 08:16 )             38.7     03-31    134[L]  |  102  |  19  ----------------------------<  119[H]  4.4   |  26  |  1.00    Ca    8.9      31 Mar 2025 08:16  Phos  3.4     03-31  Mg     2.3     03-31    TPro  7.0  /  Alb  2.9[L]  /  TBili  0.7  /  DBili  x   /  AST  27  /  ALT  28  /  AlkPhos  74  03-31    proBNP:   Lipid Profile:   HgA1c:   TSH: WNL

## 2025-03-31 NOTE — PROGRESS NOTE ADULT - SUBJECTIVE AND OBJECTIVE BOX
[facility] -- Hugo Goddard Pannella, Patel, Savella Goodger Garcia  Office # [phone]      Follow Up:  atrial tachycardia     Subjective/Observations:   No events overnight resting comfortably in bed.  No complaints of chest pain, dyspnea, or palpitations reported. No signs of orthopnea or PND.      REVIEW OF SYSTEMS: All other review of systems is negative unless indicated above    PAST MEDICAL & SURGICAL HISTORY:  HTN - Hypertension      Hypercholesteremia      Atrial tachycardia      Hypothyroid      S/P Cataract Surgery  B/L      Liver cyst  removed          MEDICATIONS  (STANDING):  amLODIPine   Tablet 5 milliGRAM(s) Oral daily  enoxaparin Injectable 40 milliGRAM(s) SubCutaneous every 24 hours  lisinopril 20 milliGRAM(s) Oral two times a day  metoprolol tartrate 50 milliGRAM(s) Oral every 8 hours  polyethylene glycol 3350 17 Gram(s) Oral daily  senna 2 Tablet(s) Oral at bedtime    MEDICATIONS  (PRN):  acetaminophen     Tablet .. 650 milliGRAM(s) Oral every 6 hours PRN Temp greater or equal to 38C (100.4F), Mild Pain (1 - 3)      Allergies    penicillins (Rash)    Intolerances        Vital Signs Last 24 Hrs  T(C): 37 (31 Mar 2025 05:08), Max: 37.2 (30 Mar 2025 20:39)  T(F): 98.6 (31 Mar 2025 05:08), Max: 99 (30 Mar 2025 20:39)  HR: 109 (31 Mar 2025 05:08) (108 - 117)  BP: 112/73 (31 Mar 2025 05:08) (112/68 - 135/82)  BP(mean): --  RR: 18 (31 Mar 2025 05:08) (17 - 18)  SpO2: 94% (31 Mar 2025 05:08) (94% - 95%)    Parameters below as of 31 Mar 2025 05:08  Patient On (Oxygen Delivery Method): room air        I&O's Summary    30 Mar 2025 07:01  -  31 Mar 2025 07:00  --------------------------------------------------------  IN: 850 mL / OUT: 700 mL / NET: 150 mL          PHYSICAL EXAM:  TELE: atach   Constitutional: NAD, awake and alert, well-developed  HEENT: Moist Mucous Membranes, Anicteric  Pulmonary: Non-labored, breath sounds are clear bilaterally, No wheezing, crackles or rhonchi  Cardiovascular: Regular, S1 and S2 nl, No murmurs, rubs, gallops or clicks  Gastrointestinal: Bowel Sounds present, soft, nontender.   Lymph: No lymphadenopathy. No peripheral edema.  Skin: No visible rashes or ulcers.  Psych:  Mood & affect appropriate    LABS: All Labs Reviewed:                        12.7   8.85  )-----------( 219      ( 31 Mar 2025 08:16 )             38.7                         12.9   8.55  )-----------( 200      ( 30 Mar 2025 07:09 )             39.2                         12.7   8.21  )-----------( 190      ( 29 Mar 2025 06:55 )             38.3     31 Mar 2025 08:16    134    |  102    |  19     ----------------------------<  119    4.4     |  26     |  1.00   30 Mar 2025 07:09    137    |  102    |  23     ----------------------------<  109    5.0     |  30     |  0.98   29 Mar 2025 06:55    136    |  105    |  20     ----------------------------<  88     4.5     |  24     |  0.81     Ca    8.9        31 Mar 2025 08:16  Ca    8.7        30 Mar 2025 07:09  Ca    9.0        29 Mar 2025 06:55  Phos  3.4       31 Mar 2025 08:16  Phos  4.0       30 Mar 2025 07:09  Phos  3.8       29 Mar 2025 06:55  Mg     2.3       31 Mar 2025 08:16  Mg     2.3       30 Mar 2025 07:09  Mg     2.0       29 Mar 2025 06:55    TPro  7.0    /  Alb  2.9    /  TBili  0.7    /  DBili  x      /  AST  27     /  ALT  28     /  AlkPhos  74     31 Mar 2025 08:16  TPro  6.4    /  Alb  3.0    /  TBili  0.4    /  DBili  x      /  AST  14     /  ALT  16     /  AlkPhos  71     30 Mar 2025 07:09  TPro  6.5    /  Alb  2.8    /  TBili  0.7    /  DBili  x      /  AST  25     /  ALT  21     /  AlkPhos  71     29 Mar 2025 06:55             EC Lead ECG:   Ventricular Rate 102 BPM    Atrial Rate 102 BPM    P-R Interval 152 ms    QRS Duration 96 ms    Q-T Interval 360 ms    QTC Calculation(Bazett) 469 ms    P Axis -26 degrees    R Axis -62 degrees    T Axis 28 degrees    Diagnosis Line Atrial tachycardia  Left anterior fascicular block  Minimal voltage criteria for LVH, may be normal variant ( Delano product )  Anteroseptal infarct (cited on or before 28-MAR-2025)  Abnormal ECG  Confirmed by natalia Joe ([id number]) on 3/29/2025 3:39:32 PM (25 @ 09:03)      TRANSTHORACIC ECHOCARDIOGRAM REPORT  ________________________________________________________________________________                                      _______       Pt. Name:       ROBERT BELLA Study Date:    3/30/2025  MRN:            [id number]       YOB: 1929  Accession #:    [id number]      Age:           89 years  Account#:       [id number]     Gender:        F  Heart Rate:                    Height:        57.87 in (147.00 cm)  Rhythm:                        Weight:   112.43 lb (51.00 kg)  Blood Pressure: 131/85 mmHg    BSA/BMI:       1.42 m² / 23.60 kg/m²  ________________________________________________________________________________________  Referring Physician:    [id number] Mahendra Greene  Interpreting Physician: Janeth Martin MD  Primary Sonographer:    Kandy Barajas RDCS    CPT:               ECHO TTE WO CON COMP W DOPP - 65025.m  Indication(s):     Abnormal electrocardiogram ECG EKG - R94.31  Procedure:         Transthoracic echocardiogram with 2-D,M-mode and complete                     spectral and color flow Doppler.  Ordering Location: TELE  Admission Status:  Inpatient  Study Information: Image quality for this study is technically difficult.    _______________________________________________________________________________________     CONCLUSIONS:      1. Technically difficult image quality.   2. Left ventricular systolic function is normal with an ejection fraction of 64 % by Hinton's method of disks.   3. Normal right ventricular cavity size and normal right ventricular systolic function.   4. Trileaflet aortic valve with normal systolic excursion. There is calcification of the aortic valve leaflets.   5. Trace mitral regurgitation.   6. Trace tricuspid regurgitation.   7. Echofree space noted in the liver.    ________________________________________________________________________________________  FINDINGS:     Left Ventricle:  Left ventricular systolic function is normal with a calculated ejection fraction of 64 % by theSimpson's biplane method of disks.     Right Ventricle:  The right ventricular cavity is normal in size and right ventricular systolic function is normal.     Left Atrium:  The left atrium is normal in size with an indexed volume of 19.39 ml/m².     Right Atrium:  The right atrium is normal in size with an indexed volume of 9.27 ml/m² and an indexed area of 6.79 cm²/m².     Aortic Valve:  The aortic valve appears trileaflet with normal systolic excursion. There is calcification of the aortic valve leaflets. There is no aortic valve stenosis. There is trace aortic regurgitation.     Mitral Valve:  There is mitral valve thickening of the tip segment of the anterior and posterior leaflets. There is trace mitral regurgitation.     Tricuspid Valve:  Structurally normal tricuspid valve with normal leaflet excursion. There is trace tricuspid regurgitation. Estimated pulmonary artery systolic pressure is 20 mmHg.     Pulmonic Valve:  Structurally normal pulmonic valve with normal leaflet excursion. There is mild cacification of the pulmonic valve leaflets.     Aorta:  The aortic root at the sinuses of Valsalva is normal in size, measuring 2.50 cm (indexed 1.76 cm/m²). The aortic arch diameter is normal in size, measuring 1.7 cm (indexed 1.19 cm/m²).     Pericardium:  No pericardial effusion seen.     Systemic Veins:  The inferior vena cava is normal in size (normal <2.1cm) with normal inspiratory collapse (normal >50%) consistent with normal right atrial pressure (~3, range 0-5mmHg).  ____________________________________________________________________  QUANTITATIVE DATA:  Left Ventricle Measurements: (Indexed to BSA)     IVSd (2D):   0.9 cm  LVPWd (2D):  0.8 cm  LVIDd (2D):  3.4 cm  LVIDs (2D):  2.2 cm  LV Mass:     81 g   57.2 g/m²  LV Vol d, MOD A2C: 47.1 ml 33.09 ml/m²  LV Vol d, MOD A4C: 60.0 ml 42.15 ml/m²  LV Vol d, MOD BP:  54.3 ml 38.15 ml/m²  LV Vol s, MOD A2C: 20.6 ml 14.47 ml/m²  LV Vol s, MOD A4C: 19.0 ml 13.35 ml/m²  LV Vol s, MOD BP:  19.7 ml 13.84 ml/m²  LVOT SV MOD BP:    34.6 ml  LV EF% MOD BP:     64 %     MV E Vmax:    1.39 m/s  e' lateral:   22.20 cm/s  e' medial:    15.70 cm/s  E/e' lateral: 6.26  E/e' medial:  8.85  E/e' Average: 7.34  MV DT:        81 msec    Aorta Measurements: (Normal range) (Indexed to BSA)     Ao Root d 2.50 cm (2.7 - 3.3 cm) 1.76 cm/m²  Ao Arch:  1.7 cm            Left Atrium Measurements: (Indexed to BSA)  LA Diam 2D: 3.00 cm         Right Ventricle Measurements: Right Atrial Measurements:     RV Base (RVID1):  2.3 cm      RA Vol:            13.20 ml  RV Mid (RVID2):   2.4 cm      RA Vol s, MOD A4C  13.2 ml  RV Major (RVID3): 5.9 cm      RA Vol Index:      9.27 ml/m²                                RA Area s, MOD A4C 9.7 cm²       LVOT / RVOT/ Qp/Qs Data: (Indexed to BSA)  LVOT Diameter,s: 1.80 cm  LVOT Area:       2.54 cm²    Mitral Valve Measurements:     MV E Vmax: 1.4 m/s       Tricuspid Valve Measurements:     TR Vmax:          2.0 m/s  TR Peak Gradient: 16.8 mmHg  RA Pressure:      3 mmHg  PASP:             20 mmHg    ________________________________________________________________________________________  Electronically signed on 3/30/2025 at 4:02:26 PM by Janeth Martin MD         *** Final ***      Radiology:

## 2025-03-31 NOTE — CAREGIVER ENGAGEMENT NOTE - CAREGIVER OUTREACH NOTES - FREE TEXT
Daughter contacted CM to enquire about her mother transitioning home today. Discussed  that per Md patient is not stable for transition home today as medications are being adjusted.

## 2025-03-31 NOTE — PROGRESS NOTE ADULT - PROBLEM SELECTOR PLAN 4
Chronic. Not on home meds anymore.  - Patient reports statin was d/c'ed a few a years ago  - LDL cholesterol mildly elevated  - Recommend outpatient follow up

## 2025-03-31 NOTE — PROGRESS NOTE ADULT - ASSESSMENT
89 yo Female with PMHx of atrial tachycardia, HTN, HLD, Hypothyroidism who presents with a chief complaint of urinary frequency and palpitations.     Atrial tachycardia  - EKG: Premature Atrial Tachycardia   - repeat EKG (3/29) unchanged    - s/p IV metoprolol 5mg push x1 and PO sotalol 40mg x1.   - now off home sotalol   - on home Lopressor every 8 hours   -EP to evaluate , rates remains tachy   -Ct negative for PE     - No clear evidence of acute ischemia  - trops negativex2   - no anginal complaints     - No meaningful evidence of volume overload.  - TTE (01/2024) shows: Left ventricular systolic function is normal with an ejection fraction of 68 %   - echo 3/30/2025 revealing lv 64%     - BP stable   continue norvasc, ace and bb     - Monitor and replete Lytes. Keep K > 4 and Mg > 2

## 2025-03-31 NOTE — PHYSICAL THERAPY INITIAL EVALUATION ADULT - ADDITIONAL COMMENTS
Patient comes from [facility] home with daughter, 3 steps to enter, flight to bedroom on second floor. Patient reports using single axis cane for short distance amb, rolling walker long distance amb PTA. Daughter occasionally helps with ADLs PTA.

## 2025-03-31 NOTE — PROGRESS NOTE ADULT - PROBLEM SELECTOR PLAN 3
/128 --> 150/85 on admission. On home fosinopril 20mg BID, hydralazine 10mg qd   - BP elevated on admission,  stable at this time  - Continue lisinopril interchange.   - Holding Hydralazine given labile HR. Start amlodipine 5 mg for BP control  - Continue home hydralazine with hold parameters   - Monitor hemodynamics

## 2025-03-31 NOTE — PROGRESS NOTE ADULT - ATTENDING COMMENTS
Patient was seen and examined by myself. Case was discussed with house staff in details. I have reviewed and agree with the plan as outlined above with edits where appropriate.    HPI:  89yo F with PMHx of atrial tachycardia, HTN, HLD, and hypothyroidism (not on meds anymore) presents to the ED with urinary frequency x1 day, associated with palpitations. Patient reports she started experiencing increased urinary frequency last night around midnight without associated dysuria or hematuria; patient states she felt she started getting anxious due to the increased urinary frequency and started feeling palpitations shortly after the urinary symptoms started; denies any associated chest pain/pressure, SOB, orthopnea, leg swelling. Patient endroses Hx of palpitations associated with anxiety in the past, but denies any Hx of Afib/atrial flutter .Per chart review, patient was hospitalized at [facility]  01/06/2024-01/13/2024 for palpitations; her home meds adjusted at that time. At the time, Cardizem, Toprol, and Clonidine were d/c'ed. Patient was started on Sotalol and Lisinopril. Outpatient, patient was started on hydralazine 10mg BID by Cardio Dr. Lugo; patient states she decreased her own dose to 10mg daily as she felt tired taking hydralzine BID; patient states Dr. Lugo aware of this. Patient states she had 3-4 UTIs last year, but reports no UTIs in 2025. Denies any recent ABx use.     Denies fever, chills, cough, abdominal pain, nausea, vomiting, diarrhea, constipation, headaches, changes in vision, dizziness, numbness, tingling.    Denies recent travel or sick contacts.    ED Course:   Vitals: BP: 180/128 HR: 120, Temp: 97.2, RR: 20, SpO2: 97% on RA  Labs:    WBCs 7.27, H/H 13.6/41.0, D-dimer 299, BUN/Cr 22/0.89, Na 137, K 4.1, Mg 2.3, Trop 8.9 --> 12.3, ProBNP 1536  UA: - LE, - nitrite, occasional bacteria   EKG: Premature Atrial Tachycardia   Received in the ED: S/p hydralazine 10mg PO x1, Lopressor 5mg IV x1, 500cc IV NS bolus x1  (28 Mar 2025 12:29)      ROS: as in the HPI; all other ROS negative    SH and family history as above    Vital Signs Last 24 Hrs  vital stable, Tele HR         GEN: NAD  HEENT- normocephalic; mouth moist  CVS- S1S2+  LUNGS- clear to auscultation; no wheezing  ABD: Soft , nontender, nondistended, Bowel sounds are present  EXTREMITY: no calf tenderness, no cyanosis, no edema  NEURO: AAOx3; non focal neurologic exam; grossly non focal neuro exam  PSYCH: normal affect and behavior  BACK: no swelling or mass;   VASCULAR: distal peripheral pulses present  SKIN: warm and dry.       Labs and imaging reviewed      Patient presenting with Patient is a 89y old  Female who presents with a chief complaint of Atrial tachycardia (29 Mar 2025 09:12)   admitted for  1. palpitation: found to have Atrial tachycardia: EKG and telemetry suggest an atrial tachycardia at around 118 bpm, 120 manual count upon physical examination.  H/O atrial tachycardia, was  on sotalol. cardio eval noted, will check Echo, c/w Tele monitor. c/w metoprolol. normal  TSH. denies anxiety. oral intake normal.   EP eval noted : started flecainide with metoprolol      2. HTN :  on lisinopril, metoprolol, hydralazine: will monitor BP /HR.  discussed with cardio, will hold off hydralazine  for now and start norvasc, BP stable       3.  + blood culture: CoNS. s/p  rocephin, follow up repeat Blood culture and urine culture  , ID eval noted : observe off Abx      4. urinary frequency: Ua negative, will follow Urine culture.         Plan of care discussed with patient  ;  all questions and concerns were addressed.

## 2025-03-31 NOTE — PROGRESS NOTE ADULT - PROBLEM SELECTOR PLAN 2
- Blood culture showed growth in aerobic bottle: gram positive cocci in clusters. s/p rocephin x1  - BCx x2  show coagulase negative Staphylococcus hominis]  - repeat BCx negative  - Monitor fever curve. No leukocytosis.  - Monitor off abx  - ID consulted DR. Tolbert , f/u recs

## 2025-03-31 NOTE — PHYSICAL THERAPY INITIAL EVALUATION ADULT - TRANSFER TRAINING, PT EVAL
Patient will improve sit <-> stand independent 3-5 sessions in order for patient to safely get in and out of chair

## 2025-03-31 NOTE — PHARMACOTHERAPY INTERVENTION NOTE - COMMENTS
89 yo F ordered for lovenox 40 mg daily for DVT ppx. CrCl=27mL/min, recommended decreasing lovenox to 30 mg daily. Discussed with Dr Rodriguez and entered.

## 2025-03-31 NOTE — CONSULT NOTE ADULT - ASSESSMENT
Assessment and plan of care: 89 yr old male with PMHx of HTN, HLD, atrial tachycardia, hypothyroidism, cataracts, recurrent UTI, and palpitations who presented to [place] ED with c/o urinary frequency and palpitations.     Impression: Review of EKG and telemetry shows NSR to atrial tachycardia with APC's                    Seen by Infectious disease consult appreciated + blood culture x 2 likley contaminated; urine analysis unremarkable; ABx discontinued.    Plan: Continue Metoprolol tartrate 25 mg po Q 76 hrs for HR          Add Flecainide 50 mg po Q 12 hrs to restore and maintain NSR.            Hydralizine 10 mg po TID discontinued           Continue Lisinopril 20 mg po QD, Continue amlodipine 5mg po QD for HTN          Continue telemetry monitoring until discharge home.           Follow up with cardiology as required ( Follows with Dr. Lugo)          Discussed and reviewed assessment and plan of care with Dr. Lira.                                            Assessment and plan of care: 89 yr old male with PMHx of HTN, HLD, atrial tachycardia, hypothyroidism, cataracts, recurrent UTI, and palpitations who presented to [place] ED with c/o urinary frequency and palpitations.     Impression: Review of EKG and telemetry shows NSR to atrial tachycardia with APC's                    Seen by Infectious disease consult appreciated + blood culture x 2 likley contaminated; urine analysis unremarkable; ABx discontinued.    Plan: Continue Metoprolol tartrate 25 mg po Q 76 hrs for HR          Add Flecainide 50 mg po Q 12 hrs to restore and maintain NSR.            Hydralizine 10 mg po TID discontinued           Continue Lisinopril 20 mg po QD, Continue amlodipine 5mg po QD for HTN          Continue telemetry monitoring until discharge home.           Follow up with cardiology as required ( Follows with Dr. Lugo)          May follow up with Dr. Lira for rhythm managment          Discussed and reviewed assessment and plan of care with Dr. Lira.                                            Assessment and plan of care: 89 yr old male with PMHx of HTN, HLD, atrial tachycardia, hypothyroidism, cataracts, recurrent UTI, and palpitations who presented to [place] ED with c/o urinary frequency and palpitations.     Impression: Review of EKG and telemetry shows NSR to atrial tachycardia with APC's                    Seen by Infectious disease consult appreciated + blood culture x 2 likley contaminated; urine analysis unremarkable; ABx discontinued.    Plan: Continue Metoprolol tartrate 50 mg po Q 8 hrs for HR          Add Flecainide 50 mg po Q 12 hrs to restore and maintain NSR.            Hydralizine 10 mg po TID discontinued           Continue Lisinopril 20 mg po QD,           Continue amlodipine 5mg po QD for HTN          Continue telemetry monitoring until discharge home.           Follow up with cardiology as required ( Follows with Dr. Lugo)          May follow up with Dr. Lira for rhythm managment          Discussed and reviewed assessment and plan of care with Dr. Lira.

## 2025-03-31 NOTE — PROGRESS NOTE ADULT - ASSESSMENT
89yo F with recurrent UTIs, atrial tachycardia, HTN, HLD, and hypothyroidism (not on meds anymore) presents to the ED with urinary frequency x1 day, associated with palpitations. Patient reports she started experiencing increased urinary frequency around midnight without associated dysuria or hematuria; patient states she felt she started getting anxious due to the increased urinary frequency and started feeling palpitations shortly after the urinary symptoms started; denies any associated chest pain/pressure, SOB, orthopnea, leg swelling.     Positive Blood culture  afebrile, no leukocytosis, Culture - Blood (03.28.25 @ 05:35)-  Coagulase negative Staphylococcus: Detec 2/2 so may be a contaminant  Culture - Blood (03.29.25 @ 07:00)  No growth at 48 hours    Dysuria and urinary frequency  not compelling for UTI so rec off further abx    Tachycardia cardiology involved    From an ID standpoint no further requirement for inpatient status for the management of ID issues. Fine with discharge from ID standpoint when other medical issues no longer require inpatient care and social issues allow for a safe discharge plan. To schedule an outpatient ID follow up appointment please call our office at [phone]    Thank you for consulting us and involving us in the management of this most interesting and challenging case.  In addition to reviewing history, imaging, documents, labs, microbiology, took into account antibiotic stewardship, local antibiogram and infection control strategies and potential transmission issues.    We will follow along in the care of this patient. Please contact me by texting me directly on my cell# at [phone] using TEAMS or call our answering service at [phone] with any concerns.

## 2025-03-31 NOTE — PROGRESS NOTE ADULT - SUBJECTIVE AND OBJECTIVE BOX
Patient is a 89y old  Female who presents with a chief complaint of Atrial tachycardia (31 Mar 2025 12:26)      INTERVAL HPI/OVERNIGHT EVENTS: Pt continues to be tachycardic to 110's overnight. Pt was seen and examined at bedside. Pt states that she feels well this AM. Denies dizziness, CP, SOB, Palpitations.   Pt denies headache, dizziness, lightheadedness, fever, chills, body aches, CP, SOB, palpitations, abdominal pain, n/v, numbness/tingling.  No other complaints at this time.     MEDICATIONS  (STANDING):  amLODIPine   Tablet 5 milliGRAM(s) Oral daily  enoxaparin Injectable 30 milliGRAM(s) SubCutaneous every 24 hours  flecainide 50 milliGRAM(s) Oral every 12 hours  lisinopril 20 milliGRAM(s) Oral two times a day  metoprolol tartrate 50 milliGRAM(s) Oral every 8 hours  polyethylene glycol 3350 17 Gram(s) Oral daily  senna 2 Tablet(s) Oral at bedtime    MEDICATIONS  (PRN):  acetaminophen     Tablet .. 650 milliGRAM(s) Oral every 6 hours PRN Temp greater or equal to 38C (100.4F), Mild Pain (1 - 3)      Allergies    penicillins (Rash)    Intolerances        REVIEW OF SYSTEMS:  CONSTITUTIONAL: No fever or chills  HEENT:  No headache, no sore throat  RESPIRATORY: No cough, wheezing, or shortness of breath  CARDIOVASCULAR: No chest pain, palpitations  GASTROINTESTINAL: No abd pain, nausea, vomiting, or diarrhea  GENITOURINARY: No dysuria, frequency, or hematuria  NEUROLOGICAL: no focal weakness or dizziness  MUSCULOSKELETAL: no myalgias     Vital Signs Last 24 Hrs  T(C): 36.6 (31 Mar 2025 11:44), Max: 37.2 (30 Mar 2025 20:39)  T(F): 97.9 (31 Mar 2025 11:44), Max: 99 (30 Mar 2025 20:39)  HR: 113 (31 Mar 2025 11:44) (108 - 117)  BP: 112/73 (31 Mar 2025 11:44) (112/68 - 135/82)  BP(mean): --  RR: 18 (31 Mar 2025 11:44) (17 - 18)  SpO2: 93% (31 Mar 2025 11:44) (93% - 95%)    Parameters below as of 31 Mar 2025 11:44  Patient On (Oxygen Delivery Method): room air        PHYSICAL EXAM:  GENERAL: NAD  HEENT:  anicteric, moist mucous membranes  CHEST/LUNG:  CTA b/l, no rales, wheezes, or rhonchi  HEART:  Tachycardic, RR, S1, S2, no MRG  ABDOMEN:  BS+, soft, nontender, nondistended  EXTREMITIES: no edema, cyanosis, or calf tenderness  NERVOUS SYSTEM: answers questions and follows commands appropriately    LABS:                        12.7   8.85  )-----------( 219      ( 31 Mar 2025 08:16 )             38.7     CBC Full  -  ( 31 Mar 2025 08:16 )  WBC Count : 8.85 K/uL  Hemoglobin : 12.7 g/dL  Hematocrit : 38.7 %  Platelet Count - Automated : 219 K/uL  Mean Cell Volume : 89.8 fl  Mean Cell Hemoglobin : 29.5 pg  Mean Cell Hemoglobin Concentration : 32.8 g/dL  Auto Neutrophil # : x  Auto Lymphocyte # : x  Auto Monocyte # : x  Auto Eosinophil # : x  Auto Basophil # : x  Auto Neutrophil % : x  Auto Lymphocyte % : x  Auto Monocyte % : x  Auto Eosinophil % : x  Auto Basophil % : x    31 Mar 2025 08:16    134    |  102    |  19     ----------------------------<  119    4.4     |  26     |  1.00     Ca    8.9        31 Mar 2025 08:16  Phos  3.4       31 Mar 2025 08:16  Mg     2.3       31 Mar 2025 08:16    TPro  7.0    /  Alb  2.9    /  TBili  0.7    /  DBili  x      /  AST  27     /  ALT  28     /  AlkPhos  74     31 Mar 2025 08:16      Urinalysis Basic - ( 31 Mar 2025 08:16 )    Color: x / Appearance: x / SG: x / pH: x  Gluc: 119 mg/dL / Ketone: x  / Bili: x / Urobili: x   Blood: x / Protein: x / Nitrite: x   Leuk Esterase: x / RBC: x / WBC x   Sq Epi: x / Non Sq Epi: x / Bacteria: x      CAPILLARY BLOOD GLUCOSE            Culture - Blood (collected 03-29-25 @ 07:00)  Source: Blood Blood-Peripheral  Preliminary Report (03-31-25 @ 13:01):    No growth at 48 Hours    Culture - Blood (collected 03-29-25 @ 06:55)  Source: Blood Blood-Peripheral  Preliminary Report (03-31-25 @ 13:01):    No growth at 48 Hours    Urinalysis with Rflx Culture (collected 03-28-25 @ 06:25)    Culture - Blood (collected 03-28-25 @ 05:35)  Source: Blood Blood-Venous  Gram Stain (03-29-25 @ 14:19):    Growth in aerobic bottle: Gram Positive Cocci in Clusters    Growth in anaerobic bottle: Gram Positive Cocci in Clusters  Final Report (03-30-25 @ 15:54):    Growth in aerobic and anaerobic bottles: Staphylococcus hominis    Direct identification is available within approximately 3-5    hours either by Blood Panel Multiplexed PCR or Direct    MALDI-TOF. Details: [email or web address]  Organism: Blood Culture PCR  Staphylococcus hominis (03-30-25 @ 15:54)  Organism: Staphylococcus hominis (03-30-25 @ 15:54)      Method Type: MENDEL      -  Clindamycin: S 0.5      -  Erythromycin: S <=0.25      -  Gentamicin: R >8 Should not be used as monotherapy      -  Oxacillin: R >2      -  Penicillin: R >2      -  Rifampin: S <=1 Should not be used as monotherapy      -  Tetracycline: S <=4      -  Trimethoprim/Sulfamethoxazole: R >2/38      -  Vancomycin: S 1  Organism: Blood Culture PCR (03-30-25 @ 15:54)      Method Type: PCR      -  Coagulase negative Staphylococcus: Detec    Culture - Blood (collected 03-28-25 @ 05:25)  Source: Blood Blood-Peripheral  Gram Stain (03-29-25 @ 04:36):    Growth in anaerobic bottle: Gram Positive Cocci in Clusters  Final Report (03-30-25 @ 12:03):    Growth in anaerobic bottle: Staphylococcus hominis    See previous culture [id number]        RADIOLOGY & ADDITIONAL TESTS: ____    Personally reviewed.     Consultant(s) Notes Reviewed:  [x] YES  [ ] NO

## 2025-03-31 NOTE — PROGRESS NOTE ADULT - PROBLEM SELECTOR PLAN 1
89yo F with PMHx of atrial tachycardia, HTN, HLD, and hypothyroidism (not on meds anymore) presents to the ED with urinary frequency x1 day, associated with palpitations  - No leukocytosis, patient afebrile on admission; does not meet sepsis criteria on admission   - EKG: Premature Atrial Tachycardia , QTc 501 on admission   - r/p EKG also atrial tach  - Previous TTE (01/2024) shows: Left ventricular systolic function is normal with an ejection fraction of 68 % by Hinton's method of disks. Mild mitral regurgitation. Mild aortic regurgitation.  - Trops neg x2  - UA negative for UTI; no recent ABx per patient and her daughter   - D-dimer 299 - age-adjusted d-dimer wnl   - S/p hydralazine 10mg PO x1, Lopressor 5mg IV x1, 500cc IV NS bolus x1 in the ED   - c/w Metoprolol tartrate 50 mg Q6H per cardiology (on sotalol at home)  - Start Flecainide 50 mg BID per EP consult  - TTE (3/31): Echocardiogram shows normal LV systolic function with EF 68% no other abnormalities.  - CTA Chest shows no piulmonary embolism  - TELE monitoring  - Cardio ([facility]) consulted, recs appreciated  - EP consulted, Recs appreciated

## 2025-03-31 NOTE — PROGRESS NOTE ADULT - SUBJECTIVE AND OBJECTIVE BOX
[facility]  Gabriel Tolbert MD PhD, Karyn Whitlock MD, Sofia Champion MD, Lisy Arellano MD, Andrew Hu MD  and providing coverage with Ashwin Arreola MD  Providing Infectious Disease Consultations at [facility], [facility], [facility], [facility]'s    Office# [phone] to schedule follow up appointments  Answering Service for urgent calls or New Consults [phone]  Cell# to text for urgent issues Gabriel Tolbert [phone]     infectious diseases progress note:    ROBERT BELLA is a 89y y. o. Female patient    Overnight and events of the last 24hrs reviewed    Allergies    penicillins (Rash)    Intolerances        ANTIBIOTICS/RELEVANT:  antimicrobials    immunologic:    OTHER:  acetaminophen     Tablet .. 650 milliGRAM(s) Oral every 6 hours PRN  amLODIPine   Tablet 5 milliGRAM(s) Oral daily  enoxaparin Injectable 30 milliGRAM(s) SubCutaneous every 24 hours  flecainide 50 milliGRAM(s) Oral every 12 hours  lisinopril 20 milliGRAM(s) Oral two times a day  metoprolol tartrate 50 milliGRAM(s) Oral every 8 hours  polyethylene glycol 3350 17 Gram(s) Oral daily  senna 2 Tablet(s) Oral at bedtime      Objective:  Vital Signs Last 24 Hrs  T(C): 36.6 (31 Mar 2025 11:44), Max: 37.2 (30 Mar 2025 20:39)  T(F): 97.9 (31 Mar 2025 11:44), Max: 99 (30 Mar 2025 20:39)  HR: 113 (31 Mar 2025 11:44) (108 - 117)  BP: 112/73 (31 Mar 2025 11:44) (112/68 - 135/82)  BP(mean): --  RR: 18 (31 Mar 2025 11:44) (17 - 18)  SpO2: 93% (31 Mar 2025 11:44) (93% - 95%)    Parameters below as of 31 Mar 2025 11:44  Patient On (Oxygen Delivery Method): room air        T(C): 36.6 (03-31-25 @ 11:44), Max: 37.2 (03-30-25 @ 20:39)  T(C): 36.6 (03-31-25 @ 11:44), Max: 37.2 (03-30-25 @ 20:39)  T(C): 36.6 (03-31-25 @ 11:44), Max: 37.2 (03-30-25 @ 20:39)    PHYSICAL EXAM:  HEENT: NC atraumatic  Neck: supple  Respiratory: no accessory muscle use, breathing comfortably  Cardiovascular: distant  Gastrointestinal: normal appearing, nondistended  Extremities: no clubbing, no cyanosis,        LABS:                          12.7   8.85  )-----------( 219      ( 31 Mar 2025 08:16 )             38.7       WBC  8.85 03-31 @ 08:16  8.55 03-30 @ 07:09  8.21 03-29 @ 06:55  7.27 03-28 @ 05:35      03-31    134[L]  |  102  |  19  ----------------------------<  119[H]  4.4   |  26  |  1.00    Ca    8.9      31 Mar 2025 08:16  Phos  3.4     03-31  Mg     2.3     03-31    TPro  7.0  /  Alb  2.9[L]  /  TBili  0.7  /  DBili  x   /  AST  27  /  ALT  28  /  AlkPhos  74  03-31      Creatinine: 1.00 mg/dL (03-31-25 @ 08:16)  Creatinine: 0.98 mg/dL (03-30-25 @ 07:09)  Creatinine: 0.81 mg/dL (03-29-25 @ 06:55)  Creatinine: 0.89 mg/dL (03-28-25 @ 05:35)        Urinalysis Basic - ( 31 Mar 2025 08:16 )    Color: x / Appearance: x / SG: x / pH: x  Gluc: 119 mg/dL / Ketone: x  / Bili: x / Urobili: x   Blood: x / Protein: x / Nitrite: x   Leuk Esterase: x / RBC: x / WBC x   Sq Epi: x / Non Sq Epi: x / Bacteria: x    INFLAMMATORY MARKERS      MICROBIOLOGY:    Culture - Blood (03.29.25 @ 07:00)    Specimen Source: Blood Blood-Peripheral   Culture Results:   No growth at 24 hours    RADIOLOGY & ADDITIONAL STUDIES:

## 2025-03-31 NOTE — PHYSICAL THERAPY INITIAL EVALUATION ADULT - PERTINENT HX OF CURRENT PROBLEM, REHAB EVAL
89yo F with PMHx of atrial tachycardia, HTN, HLD, and hypothyroidism (not on meds anymore) presents to the ED with urinary frequency x1 day, associated with palpitations. Patient reports she started experiencing increased urinary frequency last night around midnight without associated dysuria or hematuria; patient states she felt she started getting anxious due to the increased urinary frequency and started feeling palpitations shortly after the urinary symptoms started; denies any associated chest pain/pressure, SOB, orthopnea, leg swelling. Patient endroses Hx of palpitations associated with anxiety in the past, but denies any Hx of Afib/atrial flutter .Per chart review, [facility]  01/06/[facility]24-01/13/2024 for palpitations; her home meds adjusted at that time. At the time, Cardizem, Toprol, and Clonidine were d/c'ed. Patient was started on Sotalol and Lisinopril. Outpatient, patient was started on hydralazine 10mg BID by Cardio Dr. Lugo; patient states she decreased her own dose to 10mg daily as she felt tired taking hydralzine BID; patient states Dr. Lugo aware of this. Patient states she had 3-4 UTIs last year, but reports no UTIs in 2025. Denies any recent ABx use.

## 2025-04-01 ENCOUNTER — TRANSCRIPTION ENCOUNTER (OUTPATIENT)
Age: 89
End: 2025-04-01

## 2025-04-01 VITALS — SYSTOLIC BLOOD PRESSURE: 140 MMHG | HEART RATE: 101 BPM | DIASTOLIC BLOOD PRESSURE: 75 MMHG

## 2025-04-01 DIAGNOSIS — K76.89 OTHER SPECIFIED DISEASES OF LIVER: ICD-10-CM

## 2025-04-01 LAB
ALBUMIN SERPL ELPH-MCNC: 2.8 G/DL — LOW (ref 3.3–5)
ALP SERPL-CCNC: 75 U/L — SIGNIFICANT CHANGE UP (ref 40–120)
ALT FLD-CCNC: 38 U/L — SIGNIFICANT CHANGE UP (ref 12–78)
ANION GAP SERPL CALC-SCNC: 8 MMOL/L — SIGNIFICANT CHANGE UP (ref 5–17)
AST SERPL-CCNC: 29 U/L — SIGNIFICANT CHANGE UP (ref 15–37)
BASOPHILS # BLD AUTO: 0.02 K/UL — SIGNIFICANT CHANGE UP (ref 0–0.2)
BASOPHILS NFR BLD AUTO: 0.2 % — SIGNIFICANT CHANGE UP (ref 0–2)
BILIRUB SERPL-MCNC: 0.6 MG/DL — SIGNIFICANT CHANGE UP (ref 0.2–1.2)
BUN SERPL-MCNC: 20 MG/DL — SIGNIFICANT CHANGE UP (ref 7–23)
CALCIUM SERPL-MCNC: 9.1 MG/DL — SIGNIFICANT CHANGE UP (ref 8.5–10.1)
CHLORIDE SERPL-SCNC: 101 MMOL/L — SIGNIFICANT CHANGE UP (ref 96–108)
CO2 SERPL-SCNC: 26 MMOL/L — SIGNIFICANT CHANGE UP (ref 22–31)
CREAT SERPL-MCNC: 0.88 MG/DL — SIGNIFICANT CHANGE UP (ref 0.5–1.3)
EGFR: 60 ML/MIN/1.73M2 — SIGNIFICANT CHANGE UP
EGFR: 60 ML/MIN/1.73M2 — SIGNIFICANT CHANGE UP
EOSINOPHIL # BLD AUTO: 0.12 K/UL — SIGNIFICANT CHANGE UP (ref 0–0.5)
EOSINOPHIL NFR BLD AUTO: 1.3 % — SIGNIFICANT CHANGE UP (ref 0–6)
GLUCOSE SERPL-MCNC: 143 MG/DL — HIGH (ref 70–99)
HCT VFR BLD CALC: 39.9 % — SIGNIFICANT CHANGE UP (ref 34.5–45)
HGB BLD-MCNC: 13.1 G/DL — SIGNIFICANT CHANGE UP (ref 11.5–15.5)
IMM GRANULOCYTES NFR BLD AUTO: 0.8 % — SIGNIFICANT CHANGE UP (ref 0–0.9)
LYMPHOCYTES # BLD AUTO: 0.64 K/UL — LOW (ref 1–3.3)
LYMPHOCYTES # BLD AUTO: 6.8 % — LOW (ref 13–44)
MAGNESIUM SERPL-MCNC: 2.3 MG/DL — SIGNIFICANT CHANGE UP (ref 1.6–2.6)
MCHC RBC-ENTMCNC: 29.3 PG — SIGNIFICANT CHANGE UP (ref 27–34)
MCHC RBC-ENTMCNC: 32.8 G/DL — SIGNIFICANT CHANGE UP (ref 32–36)
MCV RBC AUTO: 89.3 FL — SIGNIFICANT CHANGE UP (ref 80–100)
MONOCYTES # BLD AUTO: 1.39 K/UL — HIGH (ref 0–0.9)
MONOCYTES NFR BLD AUTO: 14.7 % — HIGH (ref 2–14)
NEUTROPHILS # BLD AUTO: 7.23 K/UL — SIGNIFICANT CHANGE UP (ref 1.8–7.4)
NEUTROPHILS NFR BLD AUTO: 76.2 % — SIGNIFICANT CHANGE UP (ref 43–77)
NRBC BLD AUTO-RTO: 0 /100 WBCS — SIGNIFICANT CHANGE UP (ref 0–0)
PHOSPHATE SERPL-MCNC: 3.6 MG/DL — SIGNIFICANT CHANGE UP (ref 2.5–4.5)
PLATELET # BLD AUTO: 230 K/UL — SIGNIFICANT CHANGE UP (ref 150–400)
POTASSIUM SERPL-MCNC: 4.3 MMOL/L — SIGNIFICANT CHANGE UP (ref 3.5–5.3)
POTASSIUM SERPL-SCNC: 4.3 MMOL/L — SIGNIFICANT CHANGE UP (ref 3.5–5.3)
PROT SERPL-MCNC: 7 G/DL — SIGNIFICANT CHANGE UP (ref 6–8.3)
RBC # BLD: 4.47 M/UL — SIGNIFICANT CHANGE UP (ref 3.8–5.2)
RBC # FLD: 14.1 % — SIGNIFICANT CHANGE UP (ref 10.3–14.5)
SODIUM SERPL-SCNC: 135 MMOL/L — SIGNIFICANT CHANGE UP (ref 135–145)
WBC # BLD: 9.48 K/UL — SIGNIFICANT CHANGE UP (ref 3.8–10.5)
WBC # FLD AUTO: 9.48 K/UL — SIGNIFICANT CHANGE UP (ref 3.8–10.5)

## 2025-04-01 PROCEDURE — 80053 COMPREHEN METABOLIC PANEL: CPT

## 2025-04-01 PROCEDURE — 85610 PROTHROMBIN TIME: CPT

## 2025-04-01 PROCEDURE — 99232 SBSQ HOSP IP/OBS MODERATE 35: CPT

## 2025-04-01 PROCEDURE — 71045 X-RAY EXAM CHEST 1 VIEW: CPT

## 2025-04-01 PROCEDURE — 87186 SC STD MICRODIL/AGAR DIL: CPT

## 2025-04-01 PROCEDURE — 85025 COMPLETE CBC W/AUTO DIFF WBC: CPT

## 2025-04-01 PROCEDURE — 82962 GLUCOSE BLOOD TEST: CPT

## 2025-04-01 PROCEDURE — 93306 TTE W/DOPPLER COMPLETE: CPT

## 2025-04-01 PROCEDURE — 84100 ASSAY OF PHOSPHORUS: CPT

## 2025-04-01 PROCEDURE — 36415 COLL VENOUS BLD VENIPUNCTURE: CPT

## 2025-04-01 PROCEDURE — 93005 ELECTROCARDIOGRAM TRACING: CPT

## 2025-04-01 PROCEDURE — 87150 DNA/RNA AMPLIFIED PROBE: CPT

## 2025-04-01 PROCEDURE — 87077 CULTURE AEROBIC IDENTIFY: CPT

## 2025-04-01 PROCEDURE — 97116 GAIT TRAINING THERAPY: CPT

## 2025-04-01 PROCEDURE — 84484 ASSAY OF TROPONIN QUANT: CPT

## 2025-04-01 PROCEDURE — 97162 PT EVAL MOD COMPLEX 30 MIN: CPT

## 2025-04-01 PROCEDURE — 76705 ECHO EXAM OF ABDOMEN: CPT | Mod: 26

## 2025-04-01 PROCEDURE — 85379 FIBRIN DEGRADATION QUANT: CPT

## 2025-04-01 PROCEDURE — 99239 HOSP IP/OBS DSCHRG MGMT >30: CPT

## 2025-04-01 PROCEDURE — 76705 ECHO EXAM OF ABDOMEN: CPT

## 2025-04-01 PROCEDURE — 71275 CT ANGIOGRAPHY CHEST: CPT | Mod: MC

## 2025-04-01 PROCEDURE — 84443 ASSAY THYROID STIM HORMONE: CPT

## 2025-04-01 PROCEDURE — 99285 EMERGENCY DEPT VISIT HI MDM: CPT

## 2025-04-01 PROCEDURE — 97530 THERAPEUTIC ACTIVITIES: CPT

## 2025-04-01 PROCEDURE — 85730 THROMBOPLASTIN TIME PARTIAL: CPT

## 2025-04-01 PROCEDURE — 80061 LIPID PANEL: CPT

## 2025-04-01 PROCEDURE — 83880 ASSAY OF NATRIURETIC PEPTIDE: CPT

## 2025-04-01 PROCEDURE — 83735 ASSAY OF MAGNESIUM: CPT

## 2025-04-01 PROCEDURE — 87040 BLOOD CULTURE FOR BACTERIA: CPT

## 2025-04-01 PROCEDURE — 81001 URINALYSIS AUTO W/SCOPE: CPT

## 2025-04-01 RX ORDER — AMLODIPINE BESYLATE 10 MG/1
1 TABLET ORAL
Qty: 30 | Refills: 0
Start: 2025-04-01 | End: 2025-04-30

## 2025-04-01 RX ORDER — FLECAINIDE ACETATE 50 MG
1 TABLET ORAL
Qty: 0 | Refills: 0 | DISCHARGE
Start: 2025-04-01

## 2025-04-01 RX ORDER — METOPROLOL SUCCINATE 50 MG/1
1 TABLET, EXTENDED RELEASE ORAL
Qty: 90 | Refills: 0
Start: 2025-04-01 | End: 2025-04-30

## 2025-04-01 RX ORDER — LISINOPRIL 5 MG/1
1 TABLET ORAL
Qty: 60 | Refills: 0
Start: 2025-04-01 | End: 2025-04-30

## 2025-04-01 RX ORDER — FOSINOPRIL SODIUM 10 MG/1
1 TABLET ORAL
Refills: 0 | DISCHARGE

## 2025-04-01 RX ORDER — METOPROLOL SUCCINATE 50 MG/1
1 TABLET, EXTENDED RELEASE ORAL
Qty: 0 | Refills: 0 | DISCHARGE
Start: 2025-04-01

## 2025-04-01 RX ORDER — FLECAINIDE ACETATE 50 MG
1 TABLET ORAL
Qty: 60 | Refills: 0
Start: 2025-04-01 | End: 2025-04-30

## 2025-04-01 RX ORDER — AMLODIPINE BESYLATE 10 MG/1
1 TABLET ORAL
Qty: 0 | Refills: 0 | DISCHARGE
Start: 2025-04-01

## 2025-04-01 RX ADMIN — LISINOPRIL 20 MILLIGRAM(S): 5 TABLET ORAL at 16:55

## 2025-04-01 RX ADMIN — ENOXAPARIN SODIUM 30 MILLIGRAM(S): 100 INJECTION SUBCUTANEOUS at 16:55

## 2025-04-01 RX ADMIN — LISINOPRIL 20 MILLIGRAM(S): 5 TABLET ORAL at 05:12

## 2025-04-01 RX ADMIN — METOPROLOL SUCCINATE 50 MILLIGRAM(S): 50 TABLET, EXTENDED RELEASE ORAL at 05:12

## 2025-04-01 RX ADMIN — Medication 50 MILLIGRAM(S): at 16:55

## 2025-04-01 RX ADMIN — AMLODIPINE BESYLATE 5 MILLIGRAM(S): 10 TABLET ORAL at 05:12

## 2025-04-01 RX ADMIN — METOPROLOL SUCCINATE 50 MILLIGRAM(S): 50 TABLET, EXTENDED RELEASE ORAL at 11:34

## 2025-04-01 RX ADMIN — Medication 50 MILLIGRAM(S): at 05:12

## 2025-04-01 NOTE — PROGRESS NOTE ADULT - PROBLEM SELECTOR PROBLEM 7
Need for prophylactic measure
Hypothyroidism

## 2025-04-01 NOTE — CAREGIVER ENGAGEMENT NOTE - CAREGIVER OUTREACH NOTES - FREE TEXT
Discussed that per MD patient is stable for transition home today pending Cardiology clearance. Physical Therapist (PT) recommended home home PT. CM offered home care visiting nurse services due to new diagnosis and new medications. Pt declined both visiting nurse and home Physical therapy. Pt stated that she walks well at home and is able to negotiate steps independently, and she can manage her medications independently. CM discussed above with daughter and daughter agrees that her mother can and has been independent with managing her medications and diagnoses and does not need home Physical therapy or a visiting nurse. Discharge Notice reviewed with patient, copy given to patient. Pt verbalized understanding and is in agreement with transitioning home today. Daughter will be transporting patient home. Script for Home PT given to patient  in case she changes her mind. Daughter stated that patient has a cardiologist and has an appointment at the end of the month but she will call to schedule an earlier follow up appointment.

## 2025-04-01 NOTE — ED ADULT NURSE NOTE - CINV DISCH MEDS REVIEWED YN
Problem: Safety - Adult  Goal: Free from fall injury  Outcome: Progressing     Problem: Discharge Planning  Goal: Discharge to home or other facility with appropriate resources  Outcome: Progressing     Problem: Chronic Conditions and Co-morbidities  Goal: Patient's chronic conditions and co-morbidity symptoms are monitored and maintained or improved  Outcome: Progressing     Problem: Nutrition  Goal: Nutrient intake appropriate for maintaining nutritional needs  Outcome: Progressing     Problem: Diabetes  Goal: Achieve decreasing blood glucose levels by end of shift  Outcome: Progressing  Goal: Increase stability of blood glucose readings by end of shift  Outcome: Progressing  Goal: Decrease in ketones present in urine by end of shift  Outcome: Progressing  Goal: Maintain electrolyte levels within acceptable range throughout shift  Outcome: Progressing  Goal: Maintain glucose levels >70mg/dl to <250mg/dl throughout shift  Outcome: Progressing  Goal: No changes in neurological exam by end of shift  Outcome: Progressing  Goal: Learn about and adhere to nutrition recommendations by end of shift  Outcome: Progressing  Goal: Vital signs within normal range for age by end of shift  Outcome: Progressing  Goal: Increase self care and/or family involovement by end of shift  Outcome: Progressing  Goal: Receive DSME education by end of shift  Outcome: Progressing     Problem: Pain  Goal: Takes deep breaths with improved pain control throughout the shift  Outcome: Progressing  Goal: Turns in bed with improved pain control throughout the shift  Outcome: Progressing  Goal: Walks with improved pain control throughout the shift  Outcome: Progressing  Goal: Performs ADL's with improved pain control throughout shift  Outcome: Progressing  Goal: Participates in PT with improved pain control throughout the shift  Outcome: Progressing  Goal: Free from opioid side effects throughout the shift  Outcome: Progressing  Goal: Free from  acute confusion related to pain meds throughout the shift  Outcome: Progressing     Problem: Meds/Post-op Pain  Goal: Pain controlled to tolerate pain level  Outcome: Progressing  Goal: Tolerates prescribed medication  Outcome: Progressing     Problem: DVT/VTE Prevention/Activity  Goal: No decrease in circulation/sensation  Outcome: Progressing  Goal: Prevent skin breakdown  Outcome: Progressing  Goal: Return to preop oxygenation status  Outcome: Progressing  Goal: Tolerates optimal activity  Outcome: Progressing  Goal: Increase self care and/or family involvement in 24 hrs.  Outcome: Progressing     Problem: Wound care/infection prevention  Goal: No signs of infection in 24 hrs.  Outcome: Progressing  Goal: No unexpected bleeding from incision this shift  Outcome: Progressing     Problem: Diet/fluid balance  Goal: Adequate urinary output  Outcome: Progressing  Goal: Free from nausea/vomiting  Outcome: Progressing  Goal: Return in bowel function  Outcome: Progressing  Goal: Tolerates prescribed diet  Outcome: Progressing     Problem: Other goals  Goal: No change in neurological status  Outcome: Progressing  Goal: Stabilize vital signs (return to 10% of baseline)  Outcome: Progressing     Problem: Skin  Goal: Decreased wound size/increased tissue granulation at next dressing change  Outcome: Progressing  Goal: Participates in plan/prevention/treatment measures  Outcome: Progressing  Goal: Prevent/manage excess moisture  Outcome: Progressing  Goal: Prevent/minimize sheer/friction injuries  Outcome: Progressing  Goal: Promote/optimize nutrition  Outcome: Progressing  Goal: Promote skin healing  Outcome: Progressing     Problem: Heart Failure  Goal: Improved gas exchange this shift  Outcome: Progressing  Goal: Improved urinary output this shift  Outcome: Progressing  Goal: Reduction in peripheral edema within 24 hours  Outcome: Progressing  Goal: Report improvement of dyspnea/breathlessness this shift  Outcome:  Progressing  Goal: Weight from fluid excess reduced over 2-3 days, then stabilize  Outcome: Progressing  Goal: Increase self care and/or family involvement in 24 hours  Outcome: Progressing     Problem: Fall/Injury  Goal: Not fall by end of shift  Outcome: Progressing  Goal: Be free from injury by end of the shift  Outcome: Progressing  Goal: Verbalize understanding of personal risk factors for fall in the hospital  Outcome: Progressing  Goal: Verbalize understanding of risk factor reduction measures to prevent injury from fall in the home  Outcome: Progressing  Goal: Use assistive devices by end of the shift  Outcome: Progressing  Goal: Pace activities to prevent fatigue by end of the shift  Outcome: Progressing   The patient's goals for the shift include      The clinical goals for the shift include Patient will remain safe and free from injury/falls throughout this shift.         Yes

## 2025-04-01 NOTE — PROGRESS NOTE ADULT - PROBLEM SELECTOR PLAN 8
DVT ppx: Lovenox 40mg q24hrs     #Diet: Regular consistency, DASH/TLC    #GOC: DNR/DNI w/ trila NIV. MOLST form filled out and placed in chart.

## 2025-04-01 NOTE — PROGRESS NOTE ADULT - PROBLEM SELECTOR PLAN 7
DVT ppx: Lovenox 40mg q24hrs     #Diet: Regular consistency, DASH/TLC    #GOC: DNR/DNI w/ trila NIV. MOLST form filled out and placed in chart.
Patient states she used to be on meds but they were d/c'ed a while ago.  - TSH 2.33  - Recommend outpatient follow up
DVT ppx: Lovenox 40mg q24hrs     #Diet: Regular consistency, DASH/TLC    #GOC: DNR/DNI w/ trila NIV. MOLST form filled out and placed in chart.
DVT ppx: Lovenox 40mg q24hrs     #Diet: Regular consistency, DASH/TLC    #GOC: DNR/DNI w/ trila NIV. MOLST form filled out and placed in chart.

## 2025-04-01 NOTE — DISCHARGE NOTE NURSING/CASE MANAGEMENT/SOCIAL WORK - PATIENT PORTAL LINK FT
You can access the FollowMyHealth Patient Portal offered by [facility] by registering at the following website: [email or web address]. By joining [employer]’s FollowMyHealth portal, you will also be able to view your health information using other applications (apps) compatible with our system.

## 2025-04-01 NOTE — PROGRESS NOTE ADULT - SUBJECTIVE AND OBJECTIVE BOX
Patient is a 89y old  Female who presents with a chief complaint of Atrial tachycardia (01 Apr 2025 11:02)      INTERVAL HPI/OVERNIGHT EVENTS: No acute overnight events. Pt was seen and examined at bedside. Pt states that she feels great this morning. and wants to get out of bed today.  Pt denies headache, dizziness, lightheadedness, fever, chills, body aches, CP, SOB, palpitations, abdominal pain, n/v, numbness/tingling.  No other complaints at this time.     MEDICATIONS  (STANDING):  amLODIPine   Tablet 5 milliGRAM(s) Oral daily  enoxaparin Injectable 30 milliGRAM(s) SubCutaneous every 24 hours  flecainide 50 milliGRAM(s) Oral every 12 hours  lisinopril 20 milliGRAM(s) Oral two times a day  metoprolol tartrate 50 milliGRAM(s) Oral every 8 hours  polyethylene glycol 3350 17 Gram(s) Oral daily  senna 2 Tablet(s) Oral at bedtime    MEDICATIONS  (PRN):  acetaminophen     Tablet .. 650 milliGRAM(s) Oral every 6 hours PRN Temp greater or equal to 38C (100.4F), Mild Pain (1 - 3)      Allergies    penicillins (Rash)    Intolerances        REVIEW OF SYSTEMS:  CONSTITUTIONAL: No fever or chills  HEENT:  No headache, no sore throat  RESPIRATORY: No cough, wheezing, or shortness of breath  CARDIOVASCULAR: No chest pain, palpitations  GASTROINTESTINAL: No abd pain, nausea, vomiting, or diarrhea  GENITOURINARY: No dysuria, frequency, or hematuria  NEUROLOGICAL: no focal weakness or dizziness  MUSCULOSKELETAL: no myalgias     Vital Signs Last 24 Hrs  T(C): 36.9 (01 Apr 2025 05:06), Max: 36.9 (01 Apr 2025 05:06)  T(F): 98.4 (01 Apr 2025 05:06), Max: 98.4 (01 Apr 2025 05:06)  HR: 95 (01 Apr 2025 05:06) (95 - 113)  BP: 110/71 (01 Apr 2025 05:06) (102/67 - 112/73)  BP(mean): --  RR: 17 (01 Apr 2025 05:06) (17 - 18)  SpO2: 97% (01 Apr 2025 05:06) (93% - 97%)    Parameters below as of 01 Apr 2025 05:06  Patient On (Oxygen Delivery Method): room air        PHYSICAL EXAM:  GENERAL: NAD  HEENT:  anicteric, moist mucous membranes  CHEST/LUNG:  CTA b/l, no rales, wheezes, or rhonchi  HEART:  RRR, S1, S2  ABDOMEN:  BS+, soft, nontender, nondistended  EXTREMITIES: no edema, cyanosis, or calf tenderness  NERVOUS SYSTEM: answers questions and follows commands appropriately    LABS:                        13.1   9.48  )-----------( 230      ( 01 Apr 2025 09:35 )             39.9     CBC Full  -  ( 01 Apr 2025 09:35 )  WBC Count : 9.48 K/uL  Hemoglobin : 13.1 g/dL  Hematocrit : 39.9 %  Platelet Count - Automated : 230 K/uL  Mean Cell Volume : 89.3 fl  Mean Cell Hemoglobin : 29.3 pg  Mean Cell Hemoglobin Concentration : 32.8 g/dL  Auto Neutrophil # : x  Auto Lymphocyte # : x  Auto Monocyte # : x  Auto Eosinophil # : x  Auto Basophil # : x  Auto Neutrophil % : x  Auto Lymphocyte % : x  Auto Monocyte % : x  Auto Eosinophil % : x  Auto Basophil % : x    01 Apr 2025 09:35    135    |  101    |  20     ----------------------------<  143    4.3     |  26     |  0.88     Ca    9.1        01 Apr 2025 09:35  Phos  3.6       01 Apr 2025 09:35  Mg     2.3       01 Apr 2025 09:35    TPro  7.0    /  Alb  2.8    /  TBili  0.6    /  DBili  x      /  AST  29     /  ALT  38     /  AlkPhos  75     01 Apr 2025 09:35      Urinalysis Basic - ( 01 Apr 2025 09:35 )    Color: x / Appearance: x / SG: x / pH: x  Gluc: 143 mg/dL / Ketone: x  / Bili: x / Urobili: x   Blood: x / Protein: x / Nitrite: x   Leuk Esterase: x / RBC: x / WBC x   Sq Epi: x / Non Sq Epi: x / Bacteria: x      CAPILLARY BLOOD GLUCOSE            Culture - Blood (collected 03-29-25 @ 07:00)  Source: Blood Blood-Peripheral  Preliminary Report (03-31-25 @ 13:01):    No growth at 48 Hours    Culture - Blood (collected 03-29-25 @ 06:55)  Source: Blood Blood-Peripheral  Preliminary Report (03-31-25 @ 13:01):    No growth at 48 Hours    Urinalysis with Rflx Culture (collected 03-28-25 @ 06:25)    Culture - Blood (collected 03-28-25 @ 05:35)  Source: Blood Blood-Venous  Gram Stain (03-29-25 @ 14:19):    Growth in aerobic bottle: Gram Positive Cocci in Clusters    Growth in anaerobic bottle: Gram Positive Cocci in Clusters  Final Report (03-30-25 @ 15:54):    Growth in aerobic and anaerobic bottles: Staphylococcus hominis    Direct identification is available within approximately 3-5    hours either by Blood Panel Multiplexed PCR or Direct    MALDI-TOF. Details: [email or web address]  Organism: Blood Culture PCR  Staphylococcus hominis (03-30-25 @ 15:54)  Organism: Staphylococcus hominis (03-30-25 @ 15:54)      Method Type: MENDEL      -  Clindamycin: S 0.5      -  Erythromycin: S <=0.25      -  Gentamicin: R >8 Should not be used as monotherapy      -  Oxacillin: R >2      -  Penicillin: R >2      -  Rifampin: S <=1 Should not be used as monotherapy      -  Tetracycline: S <=4      -  Trimethoprim/Sulfamethoxazole: R >2/38      -  Vancomycin: S 1  Organism: Blood Culture PCR (03-30-25 @ 15:54)      Method Type: PCR      -  Coagulase negative Staphylococcus: Detec    Culture - Blood (collected 03-28-25 @ 05:25)  Source: Blood Blood-Peripheral  Gram Stain (03-29-25 @ 04:36):    Growth in anaerobic bottle: Gram Positive Cocci in Clusters  Final Report (03-30-25 @ 12:03):    Growth in anaerobic bottle: Staphylococcus hominis    See previous culture [id number]        RADIOLOGY & ADDITIONAL TESTS: ____    Personally reviewed.     Consultant(s) Notes Reviewed:  [x] YES  [ ] NO

## 2025-04-01 NOTE — DISCHARGE NOTE NURSING/CASE MANAGEMENT/SOCIAL WORK - NSTRANSFERBELONGINGSRESP_GEN_A_NUR
glasses  pocketbook some money (not enough to want to count)./yes glasses  pocketbook some money (not enough to want to count)/yes

## 2025-04-01 NOTE — PROGRESS NOTE ADULT - ASSESSMENT
89 yo Female with PMHx of atrial tachycardia, HTN, HLD, Hypothyroidism who presents with a chief complaint of urinary frequency and palpitations.     Atrial tachycardia  - EKG: Premature Atrial Tachycardia   - repeat EKG (3/29) unchanged    - now off home sotalol   - on home Lopressor every 8 hours   -ep eval appreciated felcanide added 3/31   -Ct negative for PE     - No clear evidence of acute ischemia  - trops negativex2   - no anginal complaints     - No meaningful evidence of volume overload.  - TTE (01/2024) shows: Left ventricular systolic function is normal with an ejection fraction of 68 %   - echo 3/30/2025 revealing lv 64%     - BP stable   continue norvasc, ace and bb     - Monitor and replete Lytes. Keep K > 4 and Mg > 2       89 yo Female with PMHx of atrial tachycardia, HTN, HLD, Hypothyroidism who presents with a chief complaint of urinary frequency and palpitations.     Atrial tachycardia  - EKG: Premature Atrial Tachycardia   - repeat EKG (3/29) unchanged    - now off home sotalol   - on home Lopressor every 8 hours   -ep eval appreciated felcanide added 3/31 fu recs  -Ct negative for PE     - No clear evidence of acute ischemia  - trops negativex2   - no anginal complaints     - No meaningful evidence of volume overload.  - TTE (01/2024) shows: Left ventricular systolic function is normal with an ejection fraction of 68 %   - echo 3/30/2025 revealing lv 64%     - BP stable   continue norvasc, ace and bb     - Monitor and replete Lytes. Keep K > 4 and Mg > 2

## 2025-04-01 NOTE — PROGRESS NOTE ADULT - NS ATTEND AMEND GEN_ALL_CORE FT
89 yo Female with PMHx of atrial tachycardia, HTN, HLD, Hypothyroidism who presents with a chief complaint of urinary frequency and palpitations.     - EKG: Premature Atrial Tachycardia   - repeat EKG (3/29) unchanged    - now off home sotalol   - on home Lopressor every 8 hours   -ep eval appreciated felcanide added 3/31 fu recs  - back in SR    - No clear evidence of acute ischemia  - trops negativex2   - No meaningful evidence of volume overload.  - TTE (01/2024) shows: Left ventricular systolic function is normal with an ejection fraction of 68 %   - echo 3/30/2025 revealing lv 64%     - continue norvasc, ace and bb     DC planning per primary team.
89 yo Female with atrial tachycardia, HTN, HLD, Hypothyroidism who presents with a chief complaint of urinary frequency and palpitations.    - presenting ekg and telemetry suggest an atrial tachycardia at around 118 bpm  - had very similar admission back in 2024 and was started on sotalol. When in a nsr, her heart rates were in the 60s  - did not convert with sotalol and qtc long  - will transition this to metopolol 25 tid  - cont to monitor on telemetry  - echocardiogram  - no sign of acute ischemia or volume overload  - cont home anti hypertensives  - now with +BCx, with work up in progress  - trend creatinine and electrolytes. Keep K>4, mg>2  - will follow with you .
89 yo Female with PMHx of atrial tachycardia, HTN, HLD, Hypothyroidism who presents with a chief complaint of urinary frequency and palpitations.     at  off sotalol and on metoprolol  adding flecainide as per ep  no acute ischemia or vol ol
89 yo Female with atrial tachycardia, HTN, HLD, Hypothyroidism who presents with a chief complaint of urinary frequency and palpitations.    - presenting ekg and telemetry suggest an atrial tachycardia at around 118 bpm  - had very similar admission back in 2024 and was started on sotalol. When in a nsr, her heart rates were in the 60s  - did not convert with sotalol and qtc long  - increase bb to 50 tid  - cont to monitor on telemetry  - echocardiogram today  - will call EP tomorrow  - no sign of acute ischemia or volume overload  - cont home anti hypertensives  - now with +BCx, with work up in progress  - trend creatinine and electrolytes. Keep K>4, mg>2  - will follow with you .

## 2025-04-01 NOTE — PROGRESS NOTE ADULT - ASSESSMENT
89yo F with recurrent UTIs, atrial tachycardia, HTN, HLD, and hypothyroidism (not on meds anymore) presents to the ED with urinary frequency x1 day, associated with palpitations. Patient reports she started experiencing increased urinary frequency around midnight without associated dysuria or hematuria; patient states she felt she started getting anxious due to the increased urinary frequency and started feeling palpitations shortly after the urinary symptoms started; denies any associated chest pain/pressure, SOB, orthopnea, leg swelling.     Positive Blood culture  afebrile, no leukocytosis, Culture - Blood (03.28.25 @ 05:35)-  Coagulase negative Staphylococcus: Detec 2/2 so likely a procurement contaminant  Culture - Blood (03.29.25 @ 07:00)  No growth at 48 hours  no further eval or abx recommended    Dysuria and urinary frequency  not compelling for UTI so rec off further abx    Tachycardia cardiology involved    From an ID standpoint no further requirement for inpatient status for the management of ID issues. Fine with discharge from ID standpoint when other medical issues no longer require inpatient care and social issues allow for a safe discharge plan. To schedule an outpatient ID follow up appointment please call our office at [phone]    Thank you for consulting us and involving us in the management of this most interesting and challenging case.   In addition to reviewing history, imaging, documents, labs, microbiology, took into account antibiotic stewardship, local antibiogram and infection control strategies and potential transmission issues.    Please call us at [phone] or text me directly on my cell#[phone] with any concerns or further questions.

## 2025-04-01 NOTE — PROGRESS NOTE ADULT - PROVIDER SPECIALTY LIST ADULT
Cardiology
Hospitalist
Infectious Disease
Infectious Disease
Cardiology
Cardiology
Infectious Disease
Cardiology
Hospitalist

## 2025-04-01 NOTE — PROGRESS NOTE ADULT - PROBLEM SELECTOR PLAN 4
Known hepatic cyst on imaging according to patient  - CTA :  Images of the upper abdomen demonstrate partially imaged 11 cm hepatic cysts. Hepatic postsurgical changes. Left renal cysts. Small hiatal hernia  - RUQ U/S: Large hepatic cyst as noted on prior exams.  approximately 10.5 cm cyst as noted on prior exams.  An additional small, 2.1 cm cyst is noted.  - LFT's WNL throughout hospitalization

## 2025-04-01 NOTE — DISCHARGE NOTE NURSING/CASE MANAGEMENT/SOCIAL WORK - FINANCIAL ASSISTANCE
[facility] provides services at a reduced cost to those who are determined to be eligible through [facility]’s financial assistance program. Information regarding [facility]’s financial assistance program can be found by going to [email or web address] or by calling [phone].

## 2025-04-01 NOTE — PROGRESS NOTE ADULT - PROBLEM SELECTOR PLAN 5
Chronic. Not on home meds anymore.  - Patient reports statin was d/c'ed a few a years ago  - LDL cholesterol mildly elevated  - Recommend outpatient follow up
Chronic. On home daily prunes  - Bowel regimen: Miralax + senna

## 2025-04-01 NOTE — PROGRESS NOTE ADULT - REASON FOR ADMISSION
Atrial tachycardia

## 2025-04-01 NOTE — PROGRESS NOTE ADULT - SUBJECTIVE AND OBJECTIVE BOX
[facility] -- Hugo Goddard Pannella, Patel, Savella Goodger, Cohen  Office # [phone]      Follow Up:  Atrial tachycardia     Subjective/Observations:     No events overnight resting comfortably in bed.  No complaints of chest pain, dyspnea, or palpitations reported. No signs of orthopnea or PND.    REVIEW OF SYSTEMS: All other review of systems is negative unless indicated above    PAST MEDICAL & SURGICAL HISTORY:  HTN - Hypertension      Hypercholesteremia      Atrial tachycardia      Hypothyroid      S/P Cataract Surgery  B/L      Liver cyst  removed          MEDICATIONS  (STANDING):  amLODIPine   Tablet 5 milliGRAM(s) Oral daily  enoxaparin Injectable 30 milliGRAM(s) SubCutaneous every 24 hours  flecainide 50 milliGRAM(s) Oral every 12 hours  lisinopril 20 milliGRAM(s) Oral two times a day  metoprolol tartrate 50 milliGRAM(s) Oral every 8 hours  polyethylene glycol 3350 17 Gram(s) Oral daily  senna 2 Tablet(s) Oral at bedtime    MEDICATIONS  (PRN):  acetaminophen     Tablet .. 650 milliGRAM(s) Oral every 6 hours PRN Temp greater or equal to 38C (100.4F), Mild Pain (1 - 3)      Allergies    penicillins (Rash)    Intolerances        Vital Signs Last 24 Hrs  T(C): 36.9 (2025 05:06), Max: 36.9 (2025 05:06)  T(F): 98.4 (2025 05:06), Max: 98.4 (2025 05:06)  HR: 95 (2025 05:06) (95 - 113)  BP: 110/71 (2025 05:06) (102/67 - 112/73)  BP(mean): --  RR: 17 (2025 05:06) (17 - 18)  SpO2: 97% (2025 05:06) (93% - 97%)    Parameters below as of 2025 05:06  Patient On (Oxygen Delivery Method): room air        I&O's Summary    31 Mar 2025 07:01  -  2025 07:00  --------------------------------------------------------  IN: 0 mL / OUT: 1850 mL / NET: -1850 mL          PHYSICAL EXAM:  TELE:   Constitutional: NAD, awake and alert, well-developed  HEENT: Moist Mucous Membranes, Anicteric  Pulmonary: Non-labored, breath sounds are clear bilaterally, No wheezing, crackles or rhonchi  Cardiovascular: Regular, S1 and S2 nl, No murmurs, rubs, gallops or clicks  Gastrointestinal: Bowel Sounds present, soft, nontender.   Lymph: No lymphadenopathy. No peripheral edema.  Skin: No visible rashes or ulcers.  Psych:  Mood & affect appropriate    LABS: All Labs Reviewed:                        12.7   8.85  )-----------( 219      ( 31 Mar 2025 08:16 )             38.7                         12.9   8.55  )-----------( 200      ( 30 Mar 2025 07:09 )             39.2     31 Mar 2025 08:16    134    |  102    |  19     ----------------------------<  119    4.4     |  26     |  1.00   30 Mar 2025 07:09    137    |  102    |  23     ----------------------------<  109    5.0     |  30     |  0.98     Ca    8.9        31 Mar 2025 08:16  Ca    8.7        30 Mar 2025 07:09  Phos  3.4       31 Mar 2025 08:16  Phos  4.0       30 Mar 2025 07:09  Mg     2.3       31 Mar 2025 08:16  Mg     2.3       30 Mar 2025 07:09    TPro  7.0    /  Alb  2.9    /  TBili  0.7    /  DBili  x      /  AST  27     /  ALT  28     /  AlkPhos  74     31 Mar 2025 08:16  TPro  6.4    /  Alb  3.0    /  TBili  0.4    /  DBili  x      /  AST  14     /  ALT  16     /  AlkPhos  71     30 Mar 2025 07:09             EC Lead ECG:   Ventricular Rate 102 BPM    Atrial Rate 102 BPM    P-R Interval 152 ms    QRS Duration 96 ms    Q-T Interval 360 ms    QTC Calculation(Bazett) 469 ms    P Axis -26 degrees    R Axis -62 degrees    T Axis 28 degrees    Diagnosis Line Atrial tachycardia  Left anterior fascicular block  Minimal voltage criteria for LVH, may be normal variant ( [place] product )  Anteroseptal infarct (cited on or before 28-MAR-2025)  Abnormal ECG  Confirmed by natalia Joe ([id number]) on 3/29/2025 3:39:32 PM (25 @ 09:03)      TRANSTHORACIC ECHOCARDIOGRAM REPORT  ________________________________________________________________________________                                      _______       Pt. Name:       ROBERT BELLA Study Date:    3/30/2025  MRN:            [id number]       YOB: 1929  Accession #:    [id number]      Age:           89 years  Account#:       [id number]     Gender:        F  Heart Rate:                    Height:        57.87 in (147.00 cm)  Rhythm:                        Weight:   112.43 lb (51.00 kg)  Blood Pressure: 131/85 mmHg    BSA/BMI:       1.42 m² / 23.60 kg/m²  ________________________________________________________________________________________  Referring Physician:    [id number] Mahendra Greene  Interpreting Physician: Janeth Martin MD  Primary Sonographer:    Kandy Barajas [facility]    CPT:               ECHO TTE WO CON COMP W DOPP - 67018.m  Indication(s):     Abnormal electrocardiogram ECG EKG - R94.31  Procedure:         Transthoracic echocardiogram with 2-D,M-mode and complete                     spectral and color flow Doppler.  Ordering Location: TELE  Admission Status:  Inpatient  Study Information: Image quality for this study is technically difficult.    _______________________________________________________________________________________     CONCLUSIONS:      1. Technically difficult image quality.   2. Left ventricular systolic function is normal with an ejection fraction of 64 % by Hinton's method of disks.   3. Normal right ventricular cavity size and normal right ventricular systolic function.   4. Trileaflet aortic valve with normal systolic excursion. There is calcification of the aortic valve leaflets.   5. Trace mitral regurgitation.   6. Trace tricuspid regurgitation.   7. Echofree space noted in the liver.    ________________________________________________________________________________________  FINDINGS:     Left Ventricle:  Left ventricular systolic function is normal with a calculated ejection fraction of 64 % by theSimpson's biplane method of disks.     Right Ventricle:  The right ventricular cavity is normal in size and right ventricular systolic function is normal.     Left Atrium:  The left atrium is normal in size with an indexed volume of 19.39 ml/m².     Right Atrium:  The right atrium is normal in size with an indexed volume of 9.27 ml/m² and an indexed area of 6.79 cm²/m².     Aortic Valve:  The aortic valve appears trileaflet with normal systolic excursion. There is calcification of the aortic valve leaflets. There is no aortic valve stenosis. There is trace aortic regurgitation.     Mitral Valve:  There is mitral valve thickening of the tip segment of the anterior and posterior leaflets. There is trace mitral regurgitation.     Tricuspid Valve:  Structurally normal tricuspid valve with normal leaflet excursion. There is trace tricuspid regurgitation. Estimated pulmonary artery systolic pressure is 20 mmHg.     Pulmonic Valve:  Structurally normal pulmonic valve with normal leaflet excursion. There is mild cacification of the pulmonic valve leaflets.     Aorta:  The aortic root at the sinuses of Valsalva is normal in size, measuring 2.50 cm (indexed 1.76 cm/m²). The aortic arch diameter is normal in size, measuring 1.7 cm (indexed 1.19 cm/m²).     Pericardium:  No pericardial effusion seen.     Systemic Veins:  The inferior vena cava is normal in size (normal <2.1cm) with normal inspiratory collapse (normal >50%) consistent with normal right atrial pressure (~3, range 0-5mmHg).  ____________________________________________________________________  QUANTITATIVE DATA:  Left Ventricle Measurements: (Indexed to BSA)     IVSd (2D):   0.9 cm  LVPWd (2D):  0.8 cm  LVIDd (2D):  3.4 cm  LVIDs (2D):  2.2 cm  LV Mass:     81 g   57.2 g/m²  LV Vol d, MOD A2C: 47.1 ml 33.09 ml/m²  LV Vol d, MOD A4C: 60.0 ml 42.15 ml/m²  LV Vol d, MOD BP:  54.3 ml 38.15 ml/m²  LV Vol s, MOD A2C: 20.6 ml 14.47 ml/m²  LV Vol s, MOD A4C: 19.0 ml 13.35 ml/m²  LV Vol s, MOD BP:  19.7 ml 13.84 ml/m²  LVOT SV MOD BP:    34.6 ml  LV EF% MOD BP:     64 %     MV E Vmax:    1.39 m/s  e' lateral:   22.20 cm/s  e' medial:    15.70 cm/s  E/e' lateral: 6.26  E/e' medial:  8.85  E/e' Average: 7.34  MV DT:        81 msec    Aorta Measurements: (Normal range) (Indexed to BSA)     Ao Root d 2.50 cm (2.7 - 3.3 cm) 1.76 cm/m²  Ao Arch:  1.7 cm            Left Atrium Measurements: (Indexed to BSA)  LA Diam 2D: 3.00 cm         Right Ventricle Measurements: Right Atrial Measurements:     RV Base (RVID1):  2.3 cm      RA Vol:            13.20 ml  RV Mid (RVID2):   2.4 cm      RA Vol s, MOD A4C  13.2 ml  RV Major (RVID3): 5.9 cm      RA Vol Index:      9.27 ml/m²                                RA Area s, MOD A4C 9.7 cm²       LVOT / RVOT/ Qp/Qs Data: (Indexed to BSA)  LVOT Diameter,s: 1.80 cm  LVOT Area:       2.54 cm²    Mitral Valve Measurements:     MV E Vmax: 1.4 m/s       Tricuspid Valve Measurements:     TR Vmax:          2.0 m/s  TR Peak Gradient: 16.8 mmHg  RA Pressure:      3 mmHg  PASP:             20 mmHg    ________________________________________________________________________________________  Electronically signed on 3/30/2025 at 4:02:26 PM by Janeth Martin MD         *** Final ***      Radiology:         [facility] -- Hugo Goddard Pannella, Patel, Savella Goodger, Cohen  Office # [phone]      Follow Up:  Atrial tachycardia     Subjective/Observations:     No events overnight resting comfortably in bed.  No complaints of chest pain, dyspnea, or palpitations reported. No signs of orthopnea or PND.  remains on room air     REVIEW OF SYSTEMS: All other review of systems is negative unless indicated above    PAST MEDICAL & SURGICAL HISTORY:  HTN - Hypertension      Hypercholesteremia      Atrial tachycardia      Hypothyroid      S/P Cataract Surgery  B/L      Liver cyst  removed          MEDICATIONS  (STANDING):  amLODIPine   Tablet 5 milliGRAM(s) Oral daily  enoxaparin Injectable 30 milliGRAM(s) SubCutaneous every 24 hours  flecainide 50 milliGRAM(s) Oral every 12 hours  lisinopril 20 milliGRAM(s) Oral two times a day  metoprolol tartrate 50 milliGRAM(s) Oral every 8 hours  polyethylene glycol 3350 17 Gram(s) Oral daily  senna 2 Tablet(s) Oral at bedtime    MEDICATIONS  (PRN):  acetaminophen     Tablet .. 650 milliGRAM(s) Oral every 6 hours PRN Temp greater or equal to 38C (100.4F), Mild Pain (1 - 3)      Allergies    penicillins (Rash)    Intolerances        Vital Signs Last 24 Hrs  T(C): 36.9 (2025 05:06), Max: 36.9 (2025 05:06)  T(F): 98.4 (2025 05:06), Max: 98.4 (2025 05:06)  HR: 95 (2025 05:06) (95 - 113)  BP: 110/71 (2025 05:06) (102/67 - 112/73)  BP(mean): --  RR: 17 (2025 05:06) (17 - 18)  SpO2: 97% (2025 05:06) (93% - 97%)    Parameters below as of 2025 05:06  Patient On (Oxygen Delivery Method): room air        I&O's Summary    31 Mar 2025 07:01  -  2025 07:00  --------------------------------------------------------  IN: 0 mL / OUT: 1850 mL / NET: -1850 mL          PHYSICAL EXAM:  TELE:     Constitutional: NAD, awake and alert, well-developed  HEENT: Moist Mucous Membranes, Anicteric  Pulmonary: Non-labored, breath sounds are clear bilaterally, No wheezing, crackles or rhonchi  Cardiovascular: Regular, S1 and S2 nl, No murmurs, rubs, gallops or clicks  Gastrointestinal: Bowel Sounds present, soft, nontender.   Lymph: No lymphadenopathy. No peripheral edema.  Skin: No visible rashes or ulcers.  Psych:  Mood & affect appropriate    LABS: All Labs Reviewed:                        12.7   8.85  )-----------( 219      ( 31 Mar 2025 08:16 )             38.7                         12.9   8.55  )-----------( 200      ( 30 Mar 2025 07:09 )             39.2     31 Mar 2025 08:16    134    |  102    |  19     ----------------------------<  119    4.4     |  26     |  1.00   30 Mar 2025 07:09    137    |  102    |  23     ----------------------------<  109    5.0     |  30     |  0.98     Ca    8.9        31 Mar 2025 08:16  Ca    8.7        30 Mar 2025 07:09  Phos  3.4       31 Mar 2025 08:16  Phos  4.0       30 Mar 2025 07:09  Mg     2.3       31 Mar 2025 08:16  Mg     2.3       30 Mar 2025 07:09    TPro  7.0    /  Alb  2.9    /  TBili  0.7    /  DBili  x      /  AST  27     /  ALT  28     /  AlkPhos  74     31 Mar 2025 08:16  TPro  6.4    /  Alb  3.0    /  TBili  0.4    /  DBili  x      /  AST  14     /  ALT  16     /  AlkPhos  71     30 Mar 2025 07:09             EC Lead ECG:   Ventricular Rate 102 BPM    Atrial Rate 102 BPM    P-R Interval 152 ms    QRS Duration 96 ms    Q-T Interval 360 ms    QTC Calculation(Bazett) 469 ms    P Axis -26 degrees    R Axis -62 degrees    T Axis 28 degrees    Diagnosis Line Atrial tachycardia  Left anterior fascicular block  Minimal voltage criteria for LVH, may be normal variant ( [place] product )  Anteroseptal infarct (cited on or before 28-MAR-2025)  Abnormal ECG  Confirmed by natalia Joe ([id number]) on 3/29/2025 3:39:32 PM (25 @ 09:03)      TRANSTHORACIC ECHOCARDIOGRAM REPORT  ________________________________________________________________________________                                      _______       Pt. Name:       ROBERT BELLA Study Date:    3/30/2025  MRN:            [id number]       YOB: 1929  Accession #:    [id number]      Age:           89 years  Account#:       [id number]     Gender:        F  Heart Rate:                    Height:        57.87 in (147.00 cm)  Rhythm:                        Weight:   112.43 lb (51.00 kg)  Blood Pressure: 131/85 mmHg    BSA/BMI:       1.42 m² / 23.60 kg/m²  ________________________________________________________________________________________  Referring Physician:    [id number] Mahendra Greene  Interpreting Physician: Janeth Martin MD  Primary Sonographer:    Kandy Barajas [facility]    CPT:               ECHO TTE WO CON COMP W DOPP - 77076.m  Indication(s):     Abnormal electrocardiogram ECG EKG - R94.31  Procedure:         Transthoracic echocardiogram with 2-D,M-mode and complete                     spectral and color flow Doppler.  Ordering Location: TELE  Admission Status:  Inpatient  Study Information: Image quality for this study is technically difficult.    _______________________________________________________________________________________     CONCLUSIONS:      1. Technically difficult image quality.   2. Left ventricular systolic function is normal with an ejection fraction of 64 % by Hinton's method of disks.   3. Normal right ventricular cavity size and normal right ventricular systolic function.   4. Trileaflet aortic valve with normal systolic excursion. There is calcification of the aortic valve leaflets.   5. Trace mitral regurgitation.   6. Trace tricuspid regurgitation.   7. Echofree space noted in the liver.    ________________________________________________________________________________________  FINDINGS:     Left Ventricle:  Left ventricular systolic function is normal with a calculated ejection fraction of 64 % by theSimpson's biplane method of disks.     Right Ventricle:  The right ventricular cavity is normal in size and right ventricular systolic function is normal.     Left Atrium:  The left atrium is normal in size with an indexed volume of 19.39 ml/m².     Right Atrium:  The right atrium is normal in size with an indexed volume of 9.27 ml/m² and an indexed area of 6.79 cm²/m².     Aortic Valve:  The aortic valve appears trileaflet with normal systolic excursion. There is calcification of the aortic valve leaflets. There is no aortic valve stenosis. There is trace aortic regurgitation.     Mitral Valve:  There is mitral valve thickening of the tip segment of the anterior and posterior leaflets. There is trace mitral regurgitation.     Tricuspid Valve:  Structurally normal tricuspid valve with normal leaflet excursion. There is trace tricuspid regurgitation. Estimated pulmonary artery systolic pressure is 20 mmHg.     Pulmonic Valve:  Structurally normal pulmonic valve with normal leaflet excursion. There is mild cacification of the pulmonic valve leaflets.     Aorta:  The aortic root at the sinuses of Valsalva is normal in size, measuring 2.50 cm (indexed 1.76 cm/m²). The aortic arch diameter is normal in size, measuring 1.7 cm (indexed 1.19 cm/m²).     Pericardium:  No pericardial effusion seen.     Systemic Veins:  The inferior vena cava is normal in size (normal <2.1cm) with normal inspiratory collapse (normal >50%) consistent with normal right atrial pressure (~3, range 0-5mmHg).  ____________________________________________________________________  QUANTITATIVE DATA:  Left Ventricle Measurements: (Indexed to BSA)     IVSd (2D):   0.9 cm  LVPWd (2D):  0.8 cm  LVIDd (2D):  3.4 cm  LVIDs (2D):  2.2 cm  LV Mass:     81 g   57.2 g/m²  LV Vol d, MOD A2C: 47.1 ml 33.09 ml/m²  LV Vol d, MOD A4C: 60.0 ml 42.15 ml/m²  LV Vol d, MOD BP:  54.3 ml 38.15 ml/m²  LV Vol s, MOD A2C: 20.6 ml 14.47 ml/m²  LV Vol s, MOD A4C: 19.0 ml 13.35 ml/m²  LV Vol s, MOD BP:  19.7 ml 13.84 ml/m²  LVOT SV MOD BP:    34.6 ml  LV EF% MOD BP:     64 %     MV E Vmax:    1.39 m/s  e' lateral:   22.20 cm/s  e' medial:    15.70 cm/s  E/e' lateral: 6.26  E/e' medial:  8.85  E/e' Average: 7.34  MV DT:        81 msec    Aorta Measurements: (Normal range) (Indexed to BSA)     Ao Root d 2.50 cm (2.7 - 3.3 cm) 1.76 cm/m²  Ao Arch:  1.7 cm            Left Atrium Measurements: (Indexed to BSA)  LA Diam 2D: 3.00 cm         Right Ventricle Measurements: Right Atrial Measurements:     RV Base (RVID1):  2.3 cm      RA Vol:            13.20 ml  RV Mid (RVID2):   2.4 cm      RA Vol s, MOD A4C  13.2 ml  RV Major (RVID3): 5.9 cm      RA Vol Index:      9.27 ml/m²                                RA Area s, MOD A4C 9.7 cm²       LVOT / RVOT/ Qp/Qs Data: (Indexed to BSA)  LVOT Diameter,s: 1.80 cm  LVOT Area:       2.54 cm²    Mitral Valve Measurements:     MV E Vmax: 1.4 m/s       Tricuspid Valve Measurements:     TR Vmax:          2.0 m/s  TR Peak Gradient: 16.8 mmHg  RA Pressure:      3 mmHg  PASP:             20 mmHg    ________________________________________________________________________________________  Electronically signed on 3/30/2025 at 4:02:26 PM by Janeth Martin MD         *** Final ***      Radiology:

## 2025-04-01 NOTE — PROGRESS NOTE ADULT - PROBLEM SELECTOR PLAN 1
89yo F with PMHx of atrial tachycardia, HTN, HLD, and hypothyroidism (not on meds anymore) presents to the ED with urinary frequency x1 day, associated with palpitations  - No leukocytosis, patient afebrile on admission; does not meet sepsis criteria on admission   - EKG: Premature Atrial Tachycardia , QTc 501 on admission   - r/p EKG also atrial tach  - Previous TTE (01/2024) shows: Left ventricular systolic function is normal with an ejection fraction of 68 % by Hinton's method of disks. Mild mitral regurgitation. Mild aortic regurgitation.  - Trops neg x2  - UA negative for UTI; no recent ABx per patient and her daughter   - D-dimer 299 - age-adjusted d-dimer wnl   - S/p hydralazine 10mg PO x1, Lopressor 5mg IV x1, 500cc IV NS bolus x1 in the ED   - c/w Metoprolol tartrate 50 mg Q8H per cardiology (on sotalol at home)  - Start Flecainide 50 mg BID per EP consult  - TTE (3/31): Echocardiogram shows normal LV systolic function with EF 68% no other abnormalities.  - CTA Chest shows no pulmonary embolism  - TELE monitoring  - Cardio ([facility]) consulted, recs appreciated  - EP consulted, Recs appreciated

## 2025-04-01 NOTE — PROGRESS NOTE ADULT - ATTENDING COMMENTS
Patient was seen and examined by myself. Case was discussed with house staff in details. I have reviewed and agree with the plan as outlined above with edits where appropriate.    HPI:  89yo F with PMHx of atrial tachycardia, HTN, HLD, and hypothyroidism (not on meds anymore) presents to the ED with urinary frequency x1 day, associated with palpitations. Patient reports she started experiencing increased urinary frequency last night around midnight without associated dysuria or hematuria; patient states she felt she started getting anxious due to the increased urinary frequency and started feeling palpitations shortly after the urinary symptoms started; denies any associated chest pain/pressure, SOB, orthopnea, leg swelling. Patient endroses Hx of palpitations associated with anxiety in the past, but denies any Hx of Afib/atrial flutter .Per chart review, patient was hospitalized at [facility]  01/06/2024-01/13/2024 for palpitations; her home meds adjusted at that time. At the time, Cardizem, Toprol, and Clonidine were d/c'ed. Patient was started on Sotalol and Lisinopril. Outpatient, patient was started on hydralazine 10mg BID by Cardio Dr. Lugo; patient states she decreased her own dose to 10mg daily as she felt tired taking hydralzine BID; patient states Dr. Lugo aware of this. Patient states she had 3-4 UTIs last year, but reports no UTIs in 2025. Denies any recent ABx use.     Denies fever, chills, cough, abdominal pain, nausea, vomiting, diarrhea, constipation, headaches, changes in vision, dizziness, numbness, tingling.    Denies recent travel or sick contacts.    ED Course:   Vitals: BP: 180/128 HR: 120, Temp: 97.2, RR: 20, SpO2: 97% on RA  Labs:    WBCs 7.27, H/H 13.6/41.0, D-dimer 299, BUN/Cr 22/0.89, Na 137, K 4.1, Mg 2.3, Trop 8.9 --> 12.3, ProBNP 1536  UA: - LE, - nitrite, occasional bacteria   EKG: Premature Atrial Tachycardia   Received in the ED: S/p hydralazine 10mg PO x1, Lopressor 5mg IV x1, 500cc IV NS bolus x1  (28 Mar 2025 12:29)      ROS: as in the HPI; all other ROS negative    SH and family history as above    Vital Signs Last 24 Hrs  vital stable, Tele HR         GEN: NAD  HEENT- normocephalic; mouth moist  CVS- S1S2+  LUNGS- clear to auscultation; no wheezing  ABD: Soft , nontender, nondistended, Bowel sounds are present  EXTREMITY: no calf tenderness, no cyanosis, no edema  NEURO: AAOx3; non focal neurologic exam; grossly non focal neuro exam  PSYCH: normal affect and behavior  BACK: no swelling or mass;   VASCULAR: distal peripheral pulses present  SKIN: warm and dry.       Labs and imaging reviewed      Patient presenting with Patient is a 89y old  Female who presents with a chief complaint of Atrial tachycardia (29 Mar 2025 09:12)   admitted for  1. palpitation: found to have Atrial tachycardia: EKG and telemetry suggest an atrial tachycardia at around 118 bpm, 120 manual count upon physical examination.  H/O atrial tachycardia, was  on sotalol. cardio eval noted, will check Echo, c/w Tele monitor. c/w metoprolol. normal  TSH. denies anxiety. oral intake normal.   EP eval noted : started flecainide with metoprolol  , HR improved Tele in 90s today     2. HTN :  on lisinopril, metoprolol, hydralazine: will monitor BP /HR.  discussed with cardio, will hold off hydralazine  for now and start norvasc, BP stable       3.  + blood culture: CoNS. s/p  rocephin, follow up repeat Blood culture and urine culture  , ID eval noted : observe off Abx              Plan of care discussed with patient  ;  all questions and concerns were addressed.

## 2025-04-01 NOTE — PROGRESS NOTE ADULT - SUBJECTIVE AND OBJECTIVE BOX
[facility]  Gabriel Tolbert MD PhD, Karyn Whitlock MD, Sofia Champion MD, Lisy Arellano MD, Andrew Hu MD  and providing coverage with Ashwin Arreola MD  Providing Infectious Disease Consultations at [facility], [facility], [facility], [facility]'s    Office# [phone] to schedule follow up appointments  Answering Service for urgent calls or New Consults [phone]  Cell# to text for urgent issues Gabriel Tolbert [phone]     infectious diseases progress note:    ROBERT BELLA is a 89y y. o. Female patient    Overnight and events of the last 24hrs reviewed    Allergies    penicillins (Rash)    Intolerances        ANTIBIOTICS/RELEVANT:  antimicrobials    immunologic:    OTHER:  acetaminophen     Tablet .. 650 milliGRAM(s) Oral every 6 hours PRN  amLODIPine   Tablet 5 milliGRAM(s) Oral daily  enoxaparin Injectable 30 milliGRAM(s) SubCutaneous every 24 hours  flecainide 50 milliGRAM(s) Oral every 12 hours  lisinopril 20 milliGRAM(s) Oral two times a day  metoprolol tartrate 50 milliGRAM(s) Oral every 8 hours  polyethylene glycol 3350 17 Gram(s) Oral daily  senna 2 Tablet(s) Oral at bedtime      Objective:  Vital Signs Last 24 Hrs  T(C): 36.9 (01 Apr 2025 05:06), Max: 36.9 (01 Apr 2025 05:06)  T(F): 98.4 (01 Apr 2025 05:06), Max: 98.4 (01 Apr 2025 05:06)  HR: 95 (01 Apr 2025 05:06) (95 - 113)  BP: 110/71 (01 Apr 2025 05:06) (102/67 - 112/73)  BP(mean): --  RR: 17 (01 Apr 2025 05:06) (17 - 18)  SpO2: 97% (01 Apr 2025 05:06) (93% - 97%)    Parameters below as of 01 Apr 2025 05:06  Patient On (Oxygen Delivery Method): room air        T(C): 36.9 (04-01-25 @ 05:06), Max: 37.2 (03-30-25 @ 20:39)  T(C): 36.9 (04-01-25 @ 05:06), Max: 37.2 (03-30-25 @ 20:39)  T(C): 36.9 (04-01-25 @ 05:06), Max: 37.2 (03-30-25 @ 20:39)    PHYSICAL EXAM:  HEENT: NC atraumatic  Neck: supple  Respiratory: no accessory muscle use, breathing comfortably  Cardiovascular: distant  Gastrointestinal: normal appearing, nondistended  Extremities: no clubbing, no cyanosis,        LABS:                          13.1   9.48  )-----------( 230      ( 01 Apr 2025 09:35 )             39.9       WBC  9.48 04-01 @ 09:35  8.85 03-31 @ 08:16  8.55 03-30 @ 07:09  8.21 03-29 @ 06:55  7.27 03-28 @ 05:35      04-01    135  |  101  |  20  ----------------------------<  143[H]  4.3   |  26  |  0.88    Ca    9.1      01 Apr 2025 09:35  Phos  3.6     04-01  Mg     2.3     04-01    TPro  7.0  /  Alb  2.8[L]  /  TBili  0.6  /  DBili  x   /  AST  29  /  ALT  38  /  AlkPhos  75  04-01      Creatinine: 0.88 mg/dL (04-01-25 @ 09:35)  Creatinine: 1.00 mg/dL (03-31-25 @ 08:16)  Creatinine: 0.98 mg/dL (03-30-25 @ 07:09)  Creatinine: 0.81 mg/dL (03-29-25 @ 06:55)  Creatinine: 0.89 mg/dL (03-28-25 @ 05:35)        Urinalysis Basic - ( 01 Apr 2025 09:35 )    Color: x / Appearance: x / SG: x / pH: x  Gluc: 143 mg/dL / Ketone: x  / Bili: x / Urobili: x   Blood: x / Protein: x / Nitrite: x   Leuk Esterase: x / RBC: x / WBC x   Sq Epi: x / Non Sq Epi: x / Bacteria: x            INFLAMMATORY MARKERS      MICROBIOLOGY:              RADIOLOGY & ADDITIONAL STUDIES:

## 2025-04-07 NOTE — ED PROVIDER NOTE - CARE PROVIDER_API CALL
Home
Eliseo Jarquin  Urology  [address], [address]  [place] [address]  Phone: [phone]  Fax: [phone]  Follow Up Time: 4-6 Days

## 2025-04-23 NOTE — H&P ADULT - NSHPOUTPATIENTPROVIDERS_GEN_ALL_CORE
Caller: Marci Stuart    Relationship to patient: Self    Best call back number: [phone]    Patient is needing: PATIENT HAD A MISSED CALL, THERE IS NOTHING NOTED .PLEASE CALL HER BACK.        Cardio Dr. Ranjit Purvis  PMD: Rebecca Stokes Cardio Dr. Church   PMD: Rebecca Stokes

## 2025-05-05 ENCOUNTER — EMERGENCY (EMERGENCY)
Facility: HOSPITAL | Age: 89
LOS: 1 days | End: 2025-05-05
Attending: STUDENT IN AN ORGANIZED HEALTH CARE EDUCATION/TRAINING PROGRAM | Admitting: STUDENT IN AN ORGANIZED HEALTH CARE EDUCATION/TRAINING PROGRAM
Payer: MEDICARE

## 2025-05-05 VITALS
SYSTOLIC BLOOD PRESSURE: 182 MMHG | HEART RATE: 64 BPM | HEIGHT: 58 IN | OXYGEN SATURATION: 98 % | RESPIRATION RATE: 16 BRPM | DIASTOLIC BLOOD PRESSURE: 92 MMHG | TEMPERATURE: 98 F | WEIGHT: 115.08 LBS

## 2025-05-05 VITALS
RESPIRATION RATE: 19 BRPM | HEART RATE: 74 BPM | OXYGEN SATURATION: 99 % | DIASTOLIC BLOOD PRESSURE: 74 MMHG | TEMPERATURE: 98 F | SYSTOLIC BLOOD PRESSURE: 152 MMHG

## 2025-05-05 DIAGNOSIS — K76.89 OTHER SPECIFIED DISEASES OF LIVER: Chronic | ICD-10-CM

## 2025-05-05 PROCEDURE — 73502 X-RAY EXAM HIP UNI 2-3 VIEWS: CPT | Mod: 26,LT

## 2025-05-05 PROCEDURE — 99284 EMERGENCY DEPT VISIT MOD MDM: CPT | Mod: FS

## 2025-05-05 PROCEDURE — 99283 EMERGENCY DEPT VISIT LOW MDM: CPT | Mod: 25

## 2025-05-05 PROCEDURE — 73502 X-RAY EXAM HIP UNI 2-3 VIEWS: CPT

## 2025-05-05 RX ORDER — IBUPROFEN 200 MG
600 TABLET ORAL ONCE
Refills: 0 | Status: COMPLETED | OUTPATIENT
Start: 2025-05-05 | End: 2025-05-05

## 2025-05-05 RX ORDER — ACETAMINOPHEN 500 MG/5ML
975 LIQUID (ML) ORAL ONCE
Refills: 0 | Status: COMPLETED | OUTPATIENT
Start: 2025-05-05 | End: 2025-05-05

## 2025-05-05 RX ADMIN — Medication 600 MILLIGRAM(S): at 12:31

## 2025-05-05 RX ADMIN — Medication 975 MILLIGRAM(S): at 11:55

## 2025-05-05 NOTE — ED PROVIDER NOTE - CARE PROVIDER_API CALL
Arturo Ruiz  Orthopaedic Surgery  [address] [place] [id number]  Phone: [phone]  Fax: [phone]  Follow Up Time: 1-3 Days

## 2025-05-05 NOTE — ED PROVIDER NOTE - CLINICAL SUMMARY MEDICAL DECISION MAKING FREE TEXT BOX
here with atraumatic hip pain. treat pain, get imaging, check ambulatory status, consider admission, re-evaluate.

## 2025-05-05 NOTE — ED PROVIDER NOTE - PROGRESS NOTE DETAILS
Patient feeling significantly better, ambulating in ED with walker with minimal pain. recommend tylenol and motrin and outpatient ortho follow up.

## 2025-05-05 NOTE — ED ADULT NURSE NOTE - NSFALLHARMRISKINTERV_ED_ALL_ED

## 2025-05-05 NOTE — ED PROVIDER NOTE - WET READ LAUNCH FT
There are no Wet Read(s) to document.
Fracture    A fracture is a break in one of your bones. This can occur from a variety of injuries, especially traumatic ones. Symptoms include pain, bruising, or swelling. Do not use the injured limb. If a fracture is in one of the bones below your waist, do not put weight on that limb unless instructed to do so by your healthcare provider. Crutches or a cane may have been provided. A splint or cast may have been applied by your health care provider. Make sure to keep it dry and follow up with an orthopedist as instructed.    SEEK IMMEDIATE MEDICAL CARE IF YOU HAVE ANY OF THE FOLLOWING SYMPTOMS: numbness, tingling, increasing pain, or weakness in any part of the injured limb.

## 2025-05-05 NOTE — ED PROVIDER NOTE - NSFOLLOWUPINSTRUCTIONS_ED_ALL_ED_FT
You can alternate between Tylenol and Motrin for your hip pain.  You can take 2 extra strength Tylenol (1000 mg) every 6 hours as needed for pain.  You can take Motrin 400 to 600 mg every 8 hours as needed for pain.  Try to take Motrin with food.      Hip Pain    Your hip is the joint between your upper legs and your lower pelvis. The bones, cartilage, tendons, and muscles of your hip joint perform a lot of work each day supporting your body weight and allowing you to move around.    Hip pain can range from a minor ache to severe pain in one or both of your hips. Pain may be felt on the inside of the hip joint near the groin, or the outside near the buttocks and upper thigh. You may have swelling or stiffness as well.     HOME CARE INSTRUCTIONS  Take medicines only as directed by your health care provider.  Apply ice to the injured area:  Put ice in a plastic bag.  Place a towel between your skin and the bag.  Leave the ice on for 15–20 minutes at a time, 3–4 times a day.  Keep your leg raised (elevated) when possible to lessen swelling.  Avoid activities that cause pain.  Follow specific exercises as directed by your health care provider.  Sleep with a pillow between your legs on your most comfortable side.  Record how often you have hip pain, the location of the pain, and what it feels like.     SEEK MEDICAL CARE IF:  You are unable to put weight on your leg.  Your hip is red or swollen or very tender to touch.  Your pain or swelling continues or worsens after 1 week.  You have increasing difficulty walking.  You have a fever.    SEEK IMMEDIATE MEDICAL CARE IF:  You have fallen.  You have a sudden increase in pain and swelling in your hip.    MAKE SURE YOU:  Understand these instructions.  Will watch your condition.  Will get help right away if you are not doing well or get worse.    ADDITIONAL NOTES AND INSTRUCTIONS    Please follow up with your Primary MD in 24-48 hr.  Seek immediate medical care for any new/worsening signs or symptoms.

## 2025-05-05 NOTE — ED PROVIDER NOTE - PATIENT PORTAL LINK FT
You can access the FollowMyHealth Patient Portal offered by [facility] by registering at the following website: [email or web address]. By joining [employer]’s FollowMyHealth portal, you will also be able to view your health information using other applications (apps) compatible with our system.

## 2025-05-05 NOTE — ED PROVIDER NOTE - ATTENDING APP SHARED VISIT CONTRIBUTION OF CARE
This was a shared visit with JUAN RAMON. I reviewed and verified the documentation and independently performed the documented MDM. Patient seen and examined by myself.

## 2025-05-05 NOTE — ED PROVIDER NOTE - OBJECTIVE STATEMENT
89-year-old female with history of hypertension, hyperlipidemia, atrial tachycardia, hypothyroidism presents to the ED complaining of left hip pain for the past 3 days.  Worse with movement, ambulation and going up stairs.  Patient tried taking 1 Tylenol last night with minimal relief.  No fall or injury.  Denies fever, chills, chest pain, shortness of breath, abdominal pain, nausea, vomiting, lower extremity weakness or paresthesias.

## 2025-05-05 NOTE — ED PROVIDER NOTE - MUSCULOSKELETAL MINIMAL EXAM
+ left anterior hip with FROM. no midline lumbar tenderness. dp pulses equal and intact bilaterally.

## 2025-05-05 NOTE — ED ADULT NURSE NOTE - OBJECTIVE STATEMENT
Pt. received alert and oriented x4 with chief complaint of three days of left sided lower back and hip pain. Pt. denies fall or injury to affected areas.
